# Patient Record
Sex: MALE | Race: WHITE | Employment: FULL TIME | URBAN - METROPOLITAN AREA
[De-identification: names, ages, dates, MRNs, and addresses within clinical notes are randomized per-mention and may not be internally consistent; named-entity substitution may affect disease eponyms.]

---

## 2017-02-08 ENCOUNTER — ANESTHESIA EVENT (OUTPATIENT)
Dept: GASTROENTEROLOGY | Facility: CLINIC | Age: 57
End: 2017-02-08

## 2017-02-08 NOTE — ANESTHESIA PREPROCEDURE EVALUATION
Anesthesia Evaluation     . Pt has had prior anesthetic. Type: General      ROS/MED HX    ENT/Pulmonary:     (+)allergic rhinitis, Moderate Persistent asthma Treatment: Inhaler prn and Inhaler daily,  , recent URI . .    Neurologic:       Cardiovascular:     (+) Dyslipidemia, hypertension----. : . . . :. . Previous cardiac testing date:results:date: results:ECG reviewed date:1-2016 results:Sinus Rhythm   WITHIN NORMAL LIMITS date: results:          METS/Exercise Tolerance:     Hematologic:         Musculoskeletal:         GI/Hepatic:     (+) GERD       Renal/Genitourinary:         Endo:     (+) Obesity, .      Psychiatric:         Infectious Disease:         Malignancy:         Other:                                                   .

## 2017-02-09 DIAGNOSIS — I10 ESSENTIAL HYPERTENSION: Primary | ICD-10-CM

## 2017-02-09 NOTE — TELEPHONE ENCOUNTER
Lisinopril/HCTZ     Last Written Prescription Date: 01/29/16  Last Fill Quantity: 90, # refills: 3  Last Office Visit with G, P or [facility] prescribing provider: 07/15/16       POTASSIUM   Date Value Ref Range Status   02/18/2016 3.9 3.4 - 5.3 mmol/L Final     CREATININE   Date Value Ref Range Status   02/18/2016 1.21 0.66 - 1.25 mg/dL Final     BP Readings from Last 3 Encounters:   07/15/16 132/79   07/07/16 104/74   02/18/16 131/76

## 2017-02-14 RX ORDER — LISINOPRIL/HYDROCHLOROTHIAZIDE 10-12.5 MG
1 TABLET ORAL DAILY
Qty: 30 TABLET | Refills: 0 | Status: SHIPPED | OUTPATIENT
Start: 2017-02-14 | End: 2017-03-20

## 2017-02-15 ENCOUNTER — OFFICE VISIT (OUTPATIENT)
Dept: FAMILY MEDICINE | Facility: CLINIC | Age: 57
End: 2017-02-15
Payer: COMMERCIAL

## 2017-02-15 VITALS
HEIGHT: 69 IN | BODY MASS INDEX: 32.26 KG/M2 | DIASTOLIC BLOOD PRESSURE: 71 MMHG | OXYGEN SATURATION: 96 % | HEART RATE: 98 BPM | TEMPERATURE: 100.4 F | SYSTOLIC BLOOD PRESSURE: 133 MMHG | WEIGHT: 217.8 LBS

## 2017-02-15 DIAGNOSIS — J20.9 ACUTE BRONCHITIS WITH SYMPTOMS > 10 DAYS: Primary | ICD-10-CM

## 2017-02-15 DIAGNOSIS — J45.901 ASTHMA EXACERBATION: ICD-10-CM

## 2017-02-15 PROCEDURE — 99214 OFFICE O/P EST MOD 30 MIN: CPT | Performed by: INTERNAL MEDICINE

## 2017-02-15 RX ORDER — DOXYCYCLINE 100 MG/1
100 TABLET ORAL 2 TIMES DAILY
Qty: 20 TABLET | Refills: 0 | Status: SHIPPED | OUTPATIENT
Start: 2017-02-15 | End: 2017-02-25

## 2017-02-15 RX ORDER — PREDNISONE 20 MG/1
40 TABLET ORAL DAILY
Qty: 10 TABLET | Refills: 1 | Status: SHIPPED | OUTPATIENT
Start: 2017-02-15 | End: 2017-04-04

## 2017-02-15 NOTE — MR AVS SNAPSHOT
After Visit Summary   2/15/2017    Markell Ramsey    MRN: [id number]           Patient Information     Date Of Birth          1960        Visit Information        Provider Department      2/15/2017 9:00 AM Toñito Ramírez MD [facility]        Today's Diagnoses     Acute bronchitis with symptoms > 10 days    -  1    Moderate persistent asthma, uncomplicated        Asthma exacerbation           Follow-ups after your visit        Your next 10 appointments already scheduled     Feb 20, 2017   Procedure with Tadeo Bueno MD   [facility] ([facility])    [address] [address] [phone]           The medical center is located at [address] (between [address] in [place], [place] miles north of [place]).              Who to contact     If you have questions or need follow up information about today's clinic visit or your schedule please contact [facility] directly at [phone].  Normal or non-critical lab and imaging results will be communicated to you by MyChart, letter or phone within 4 business days after the clinic has received the results. If you do not hear from us within 7 days, please contact the clinic through [employer] or phone. If you have a critical or abnormal lab result, we will notify you by phone as soon as possible.  Submit refill requests through [employer] or call your pharmacy and they will forward the refill request to us. Please allow 3 business days for your refill to be completed.          Additional Information About Your Visit        MyChart Information     [employer] gives you secure access to your electronic health record. If you see a primary care provider, you can also send messages to your care team and make appointments. If you have questions, please call your primary care clinic.  If you do not have a primary care provider, please call [phone] and they will assist you.        Care  "EveryWhere ID     This is your Care EveryWhere ID. This could be used by other organizations to access your [place] medical records  [id number]        Your Vitals Were     Pulse Temperature Height Pulse Oximetry BMI (Body Mass Index)       98 100.4  F (38  C) (Tympanic) 5' 9.25\" (1.759 m) 96% 31.93 kg/m2        Blood Pressure from Last 3 Encounters:   02/15/17 133/71   07/15/16 132/79   07/07/16 104/74    Weight from Last 3 Encounters:   02/15/17 217 lb 12.8 oz (98.8 kg)   07/15/16 214 lb (97.1 kg)   07/07/16 215 lb (97.5 kg)              Today, you had the following     No orders found for display         Today's Medication Changes          These changes are accurate as of: 2/15/17  9:31 AM.  If you have any questions, ask your nurse or doctor.               Start taking these medicines.        Dose/Directions    doxycycline Monohydrate 100 MG Tabs   Used for:  Acute bronchitis with symptoms > 10 days   Started by:  Toñito Ramírez MD        Dose:  100 mg   Take 100 mg by mouth 2 times daily for 10 days   Quantity:  20 tablet   Refills:  0         These medicines have changed or have updated prescriptions.        Dose/Directions    predniSONE 20 MG tablet   Commonly known as:  DELTASONE   This may have changed:    - how much to take  - when to take this   Used for:  Asthma exacerbation   Changed by:  Toñito Ramírez MD        Dose:  40 mg   Take 2 tablets (40 mg) by mouth daily   Quantity:  10 tablet   Refills:  1         Stop taking these medicines if you haven't already. Please contact your care team if you have questions.     fluticasone 220 MCG/ACT Inhaler   Commonly known as:  FLOVENT HFA   Stopped by:  Toñito Ramírez MD           sulfamethoxazole-trimethoprim 800-160 MG per tablet   Commonly known as:  BACTRIM DS/SEPTRA DS   Stopped by:  Toñito Ramírez MD                Where to get your medicines      These medications were sent to [facility] [address] [place] [address] - [address] [facility] [address] [facility] OF " [address] [place] [address]  [address] [facility] [address]     Phone:  [phone]     doxycycline Monohydrate 100 MG Tabs    predniSONE 20 MG tablet                Primary Care Provider Office Phone # Fax #    Mary Lezama [phone]       [facility] [address] [facility] [address]        Thank you!     Thank you for choosing [facility]  for your care. Our goal is always to provide you with excellent care. Hearing back from our patients is one way we can continue to improve our services. Please take a few minutes to complete the written survey that you may receive in the mail after your visit with us. Thank you!             Your Updated Medication List - Protect others around you: Learn how to safely use, store and throw away your medicines at www.disposemymeds.org.          This list is accurate as of: 2/15/17  9:31 AM.  Always use your most recent med list.                   Brand Name Dispense Instructions for use    * albuterol (2.5 MG/3ML) 0.083% neb solution     30 vial    Take 1 vial (2.5 mg) by nebulization every 4 hours as needed for shortness of breath / dyspnea       * albuterol 108 (90 BASE) MCG/ACT Inhaler    VENTOLIN HFA    1 Inhaler    Inhale 2 puffs into the lungs every 4 hours as needed Take for shortness of breath, cough or wheeze. Take before exercise.       atorvastatin 10 MG tablet    LIPITOR    90 tablet    Take 1 tablet (10 mg) by mouth daily       beclomethasone 80 MCG/ACT Inhaler    QVAR    3 Inhaler    1 puff each Nare  with adapter BID  (uses NASAL)       doxycycline Monohydrate 100 MG Tabs     20 tablet    Take 100 mg by mouth 2 times daily for 10 days       fexofenadine 180 MG tablet    ALLEGRA    30 tablet    Take 1 tablet (180 mg) by mouth daily       fluticasone-salmeterol 500-50 MCG/DOSE diskus inhaler    ADVAIR DISKUS    3 Inhaler    Inhale 1 puff into the lungs 2 times daily       lisinopril-hydrochlorothiazide  10-12.5 MG per tablet    PRINZIDE/ZESTORETIC    30 tablet    Take 1 tablet by mouth daily (Needs follow-up appointment for this medication)       montelukast 10 MG tablet    SINGULAIR    90 tablet    Take 1 tablet (10 mg) by mouth At Bedtime       nebulizer/tubing/mouthpiece Kit     1 kit    use with nebulizer       omeprazole 20 MG CR capsule    priLOSEC    180 capsule    Take 1 capsule by mouth 2 times daily.       ondansetron 4 MG ODT tab    ZOFRAN ODT    20 tablet    Take 1-2 tablets (4-8 mg) by mouth every 8 hours as needed for nausea       predniSONE 20 MG tablet    DELTASONE    10 tablet    Take 2 tablets (40 mg) by mouth daily       * Notice:  This list has 2 medication(s) that are the same as other medications prescribed for you. Read the directions carefully, and ask your doctor or other care provider to review them with you.

## 2017-02-15 NOTE — NURSING NOTE
"Chief Complaint   Patient presents with     Cough     x 10 days, w/ fever 101.9 yesterday.        Initial /71 (BP Location: Right arm, Patient Position: Chair, Cuff Size: Adult Regular)  Pulse 98  Temp 100.4  F (38  C) (Tympanic)  Ht 5' 9.25\" (1.759 m)  Wt 217 lb 12.8 oz (98.8 kg)  SpO2 96%  BMI 31.93 kg/m2 Estimated body mass index is 31.93 kg/(m^2) as calculated from the following:    Height as of this encounter: 5' 9.25\" (1.759 m).    Weight as of this encounter: 217 lb 12.8 oz (98.8 kg).  Medication Reconciliation: complete   Diana FAULKNER CMA ([facility])    "

## 2017-02-15 NOTE — PROGRESS NOTES
SUBJECTIVE:                                                    Markell Ramsey is a 56 year old male who presents to clinic today for the following health issues:    Chief Complaint   Patient presents with     Cough     x 10 days, w/ fever 101.9 yesterday.      ENT Symptoms             Symptoms: cc Present Absent Comment   Fever/Chills  x  101.9 yesterday, 100.4 in clinic today.  Bad chills yesterda   Fatigue  x     Muscle Aches   x    Eye Irritation   x    Sneezing   x    Nasal Jeronimo/Drg  x  Very inflammed   Sinus Pressure/Pain  x  A little bit.  Worse before and improving.  Used Afrin for a few days   Loss of smell  x     Dental pain   x    Sore Throat   x    Swollen Glands   x    Ear Pain/Fullness   x    Cough x x  Productive of multi colored sputum   Wheeze  x     Chest Pain   x Hurts in the upper chest w/ cough   Shortness of breath   x Only w/ coughing spasm    Rash   x    Other         Symptom duration:  10 days    Symptom severity:     Treatments tried:  Tylenol, Advil for fever    Contacts:  no known        Markell has a history of asthma and his breathing was doing well, but has worsened with this illness.       Problem list and histories reviewed & adjusted, as indicated.  Additional history: as documented    Current Outpatient Prescriptions   Medication Sig Dispense Refill     predniSONE (DELTASONE) 20 MG tablet Take 2 tablets (40 mg) by mouth daily 10 tablet 1     doxycycline Monohydrate 100 MG TABS Take 100 mg by mouth 2 times daily for 10 days 20 tablet 0     lisinopril-hydrochlorothiazide (PRINZIDE/ZESTORETIC) 10-12.5 MG per tablet Take 1 tablet by mouth daily (Needs follow-up appointment for this medication) 30 tablet 0     montelukast (SINGULAIR) 10 MG tablet Take 1 tablet (10 mg) by mouth At Bedtime 90 tablet 0     beclomethasone (QVAR) 80 MCG/ACT Inhaler 1 puff each Nare  with adapter BID  (uses NASAL) 3 Inhaler 0     fluticasone-salmeterol (ADVAIR DISKUS) 500-50 MCG/DOSE diskus inhaler Inhale 1 puff  "into the lungs 2 times daily 3 Inhaler 0     atorvastatin (LIPITOR) 10 MG tablet Take 1 tablet (10 mg) by mouth daily 90 tablet 3     albuterol (VENTOLIN HFA) 108 (90 BASE) MCG/ACT inhaler Inhale 2 puffs into the lungs every 4 hours as needed Take for shortness of breath, cough or wheeze. Take before exercise. 1 Inhaler 11     omeprazole (PRILOSEC) 20 MG capsule Take 1 capsule by mouth 2 times daily. 180 capsule 3     ondansetron (ZOFRAN ODT) 4 MG disintegrating tablet Take 1-2 tablets (4-8 mg) by mouth every 8 hours as needed for nausea 20 tablet 1     albuterol (2.5 MG/3ML) 0.083% nebulizer solution Take 1 vial (2.5 mg) by nebulization every 4 hours as needed for shortness of breath / dyspnea 30 vial 11     Respiratory Therapy Supplies (NEBULIZER/TUBING/MOUTHPIECE) KIT use with nebulizer 1 kit 11     fexofenadine (ALLEGRA) 180 MG tablet Take 1 tablet (180 mg) by mouth daily 30 tablet 11     No Known Allergies    ROS:  Constitutional, HEENT, pulmonary systems are negative, except as otherwise noted.    OBJECTIVE:                                                    /71 (BP Location: Right arm, Patient Position: Chair, Cuff Size: Adult Regular)  Pulse 98  Temp 100.4  F (38  C) (Tympanic)  Ht 5' 9.25\" (1.759 m)  Wt 217 lb 12.8 oz (98.8 kg)  SpO2 96%  BMI 31.93 kg/m2  Body mass index is 31.93 kg/(m^2).    GENERAL: alert and no distress, occasional coughing  EYES: Eyes grossly normal to inspection, PERRL and conjunctivae and sclerae normal  HENT: ear canals and TMs normal, sinuses non tender to percussion, nose and mouth without ulcers or lesions, oropharynx red but no tonsillar exudates  NECK: no adenopathy, no asymmetry, masses, or scars  RESP: slightly increased effort, diffuse expiratory wheezes  CV: regular rate and rhythm, normal S1 S2, no S3 or S4, no murmur, click or rub     Diagnostic Test Results:  none      ASSESSMENT/PLAN:                                                        1. Acute bronchitis with " symptoms > 10 days  2. Asthma exacerbation    Markell has respiratory symptoms of 10 days duration- bronchitis vs possible pneumonia since he has been febrile.  Since I planned to treat with antibiotic due to duration of illness regardless of x-ray result, we decided not to do an x-ray.  He is having diffuse wheezing on exam with slightly increased respiratory effort, so he also has a mild asthma exacerbation.  We will treat with both doxycycline and prednisone.  Continue asthma inhalers.      - doxycycline Monohydrate 100 MG TABS; Take 100 mg by mouth 2 times daily for 10 days  Dispense: 20 tablet; Refill: 0  - predniSONE (DELTASONE) 20 MG tablet; Take 2 tablets (40 mg) by mouth daily  Dispense: 10 tablet; Refill: 1    Follow-up as needed.      Toñito Ramírez MD  [facility]

## 2017-02-16 ASSESSMENT — ASTHMA QUESTIONNAIRES: ACT_TOTALSCORE: 17

## 2017-02-20 ENCOUNTER — ANESTHESIA (OUTPATIENT)
Dept: GASTROENTEROLOGY | Facility: CLINIC | Age: 57
End: 2017-02-20

## 2017-03-20 DIAGNOSIS — I10 ESSENTIAL HYPERTENSION: ICD-10-CM

## 2017-03-20 DIAGNOSIS — E78.5 HYPERLIPIDEMIA LDL GOAL <130: Primary | ICD-10-CM

## 2017-03-20 NOTE — LETTER
[facility]  [address] [address]  Phone: [phone]       March 23, 2017         Markell Ramsey  [address] [facility] [address] [facility] [address]            Dear Markell:    We are concerned about your health care.  We recently provided you with medication refills.  Many medications require routine follow-up with your doctor.  You are due for labs for further refills of your medication.    Your prescription(s) have been refilled for one month so you may have time for the above noted follow-up. Please call to schedule soon so we can assure you have an appointment before your next refills are needed.    Thank you,      Mary Lezama MD

## 2017-03-23 RX ORDER — LISINOPRIL/HYDROCHLOROTHIAZIDE 10-12.5 MG
1 TABLET ORAL DAILY
Qty: 30 TABLET | Refills: 0 | Status: SHIPPED | OUTPATIENT
Start: 2017-03-23 | End: 2017-04-25

## 2017-04-04 ENCOUNTER — HOSPITAL ENCOUNTER (EMERGENCY)
Facility: CLINIC | Age: 57
Discharge: HOME | End: 2017-04-04
Attending: EMERGENCY MEDICINE | Admitting: EMERGENCY MEDICINE
Payer: COMMERCIAL

## 2017-04-04 VITALS
OXYGEN SATURATION: 98 % | RESPIRATION RATE: 16 BRPM | HEIGHT: 69 IN | TEMPERATURE: 98.8 F | HEART RATE: 100 BPM | SYSTOLIC BLOOD PRESSURE: 120 MMHG | WEIGHT: 215 LBS | DIASTOLIC BLOOD PRESSURE: 83 MMHG | BODY MASS INDEX: 31.84 KG/M2

## 2017-04-04 DIAGNOSIS — L02.419 CELLULITIS AND ABSCESS OF LEG: ICD-10-CM

## 2017-04-04 DIAGNOSIS — L03.119 CELLULITIS AND ABSCESS OF LEG: ICD-10-CM

## 2017-04-04 PROCEDURE — 10060 I&D ABSCESS SIMPLE/SINGLE: CPT

## 2017-04-04 PROCEDURE — 99284 EMERGENCY DEPT VISIT MOD MDM: CPT | Mod: 25 | Performed by: EMERGENCY MEDICINE

## 2017-04-04 PROCEDURE — 99283 EMERGENCY DEPT VISIT LOW MDM: CPT | Mod: 25

## 2017-04-04 PROCEDURE — 87070 CULTURE OTHR SPECIMN AEROBIC: CPT | Performed by: EMERGENCY MEDICINE

## 2017-04-04 PROCEDURE — 10060 I&D ABSCESS SIMPLE/SINGLE: CPT | Performed by: EMERGENCY MEDICINE

## 2017-04-04 PROCEDURE — 87186 SC STD MICRODIL/AGAR DIL: CPT | Performed by: EMERGENCY MEDICINE

## 2017-04-04 PROCEDURE — 87077 CULTURE AEROBIC IDENTIFY: CPT | Performed by: EMERGENCY MEDICINE

## 2017-04-04 RX ORDER — SULFAMETHOXAZOLE/TRIMETHOPRIM 800-160 MG
1 TABLET ORAL 2 TIMES DAILY
Qty: 20 TABLET | Refills: 0 | Status: SHIPPED | OUTPATIENT
Start: 2017-04-04 | End: 2017-04-04 | Stop reason: ALTCHOICE

## 2017-04-04 RX ORDER — CLINDAMYCIN HCL 300 MG
300 CAPSULE ORAL 4 TIMES DAILY
Qty: 40 CAPSULE | Refills: 0 | Status: SHIPPED | OUTPATIENT
Start: 2017-04-04 | End: 2017-04-14

## 2017-04-04 NOTE — ED PROVIDER NOTES
"  History     Chief Complaint   Patient presents with     Cyst     has wound near pilinidal area  has hx of wound  with MRSA    Started  Thursday  and has greatly increased over last few days      HPI  Markell Ramsey is a 56 year old male who presents with a left posterior leg \"cyst\" present for ~5 days, began this past Thursday, which became more severe yesterday. 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 He notes that it hurts slightly to sit on for a few seconds, but he becomes accustomed to the pain, at which point it does not bother him. No pointing or drainage. F/C/V.   He notes that this has occurred a few times over the past two years, where a cyst will develop, quickly \"balloon\" in size, and require incision, drainage, and antibiotics, his most recent cyst was in the right axilla.   He notes that one of the cysts was cultured and grew MRSA, but does not note any systemic symptoms. No fever or chills, bladder or bowel issues (other than baseline IBS).    I have reviewed the Medications, Allergies, Past Medical and Surgical History, and Social History in the Epic system.  Patient Active Problem List   Diagnosis     Allergic rhinitis, unspecified allergic rhinitis type     Gastroesophageal reflux disease without esophagitis     Non morbid obesity due to excess calories     CARDIOVASCULAR SCREENING; LDL GOAL LESS THAN 160     IgM deficiency (H)     Moderate persistent asthma, uncomplicated     Essential hypertension     Hyperlipidemia LDL goal <130     Past Medical History:   Diagnosis Date     Acute bronchitis      Allergic rhinitis, cause unspecified 9/23/2008     Allergic rhinitis, unspecified allergic rhinitis type 9/23/2008     Problem list name updated by automated process. Provider to review     Esophageal reflux      Pneumonia 2/24/2013    MRSA, required hospitalization     Unspecified asthma(493.90)      Past Surgical History:   Procedure Laterality Date     C APPENDECTOMY  " "08/2006     SURGICAL HISTORY OF -   2/23/06    L4-5 microdiscectomy     TONSILLECTOMY & ADENOIDECTOMY       No current facility-administered medications for this encounter.      Current Outpatient Prescriptions   Medication     clindamycin (CLEOCIN) 300 MG capsule     lisinopril-hydrochlorothiazide (PRINZIDE/ZESTORETIC) 10-12.5 MG per tablet     montelukast (SINGULAIR) 10 MG tablet     beclomethasone (QVAR) 80 MCG/ACT Inhaler     fluticasone-salmeterol (ADVAIR DISKUS) 500-50 MCG/DOSE diskus inhaler     atorvastatin (LIPITOR) 10 MG tablet     albuterol (2.5 MG/3ML) 0.083% nebulizer solution     albuterol (VENTOLIN HFA) 108 (90 BASE) MCG/ACT inhaler     fexofenadine (ALLEGRA) 180 MG tablet     omeprazole (PRILOSEC) 20 MG capsule     Respiratory Therapy Supplies (NEBULIZER/TUBING/MOUTHPIECE) KIT     No Known Allergies  Social History   Substance Use Topics     Smoking status: Never Smoker     Smokeless tobacco: Never Used     Alcohol use Yes      Comment: rare     Family History   Problem Relation Age of Onset     CANCER Father      lung     Allergies Mother      asthma     Respiratory Sister      asthma     Respiratory Son      Upper Resp infections     Respiratory Son      asthma       Review of Systems  Review Of Systems  Skin: positive for left posterior leg abscess  Eyes: negative  Ears/Nose/Throat: negative  Respiratory: No shortness of breath, dyspnea on exertion, cough, or hemoptysis (past history of asthma, but no asthma symptoms currently)  Cardiovascular: negative  Gastrointestinal: negative (past history of IBS, but not complaining of any GI issues at this time.  Genitourinary: negative  Musculoskeletal: negative  Neurologic: negative  Psychiatric: negative  Hematologic/Lymphatic/Immunologic: negative  Endocrine: negative    Physical Exam   BP: 120/83  Pulse: 100  Temp: 98.8  F (37.1  C)  Resp: 16  Height: 175.3 cm (5' 9\")  Weight: 97.5 kg (215 lb)  SpO2: 98 %  Physical Exam  Exam:  Constitutional: healthy, " alert and no distress  Head: Normocephalic. No masses, lesions, tenderness or abnormalities  ENT: no neck nodes or sinus tenderness  Cardiovascular: negative, PMI normal. No lifts, heaves, or thrills. RRR. No murmurs, clicks gallops or rub  Respiratory: negative, Lungs clear  Musculoskeletal: extremities normal- no gross deformities noted, gait normal and normal muscle tone  Skin: 2.5 by 2.5 cm cystic mass noted in posterior left thigh, just inferior to left gluteal region, surrounding erythema noted ~5 cm in diameter.  Neurologic: Gait normal. Reflexes normal and symmetric. Sensation grossly WNL.  Psychiatric: mentation appears normal and affect normal/bright  Hematologic/Lymphatic/Immunologic: Normal cervical lymph nodes    ED Course     ED Course     Incision + drainage  Performed by: MARK CAMPBELL  Authorized by: MARK CAMPBELL   Consent: Verbal consent obtained.  Risks and benefits: risks, benefits and alternatives were discussed  Consent given by: patient  Patient understanding: patient states understanding of the procedure being performed  Patient consent: the patient's understanding of the procedure matches consent given  Type: abscess  Body area: lower extremity  Location details: left leg  Anesthesia: local infiltration    Anesthesia:  Anesthesia: local infiltration  Local Anesthetic: bupivacaine 0.5% without epinephrine   Sedation:  Patient sedated: no    Scalpel size: 15  Needle gauge: 18 (Attempted aspiration in addition to incision and drainage.)  Incision type: single straight  Incision depth: subcutaneous  Complexity: complex (Wound probed for any potential loculations.  Abscess cavity irrigated.  18-gauge needle on a syringe was used to attempt aspiration of possible loculations adjacent to the primary abscess cavity.)  Drainage: purulent  Drainage amount: scant  Wound treatment: wound left open  Packing material: none  Patient tolerance: Patient tolerated the procedure well with no immediate  complications              Results for orders placed or performed during the hospital encounter of 04/04/17   Abscess Culture Aerobic Bacterial   Result Value Ref Range    Specimen Description Left Leg Abscess     Special Requests       Specimen collected in eSwab transport (white cap) This specimen was received on a   swab. Results may not be optimal. For maximum sensitivity of detection, submit   tissue, fluid, or needle aspirate.      Culture Micro (A)      Moderate growth Methicillin resistant Staphylococcus aureus (MRSA) This isolate   DOES NOT demonstrate inducible clindamycin resistance in vitro. Clindamycin is   susceptible and could be used when indicated, however, erythromycin is   resistant and should not be used.      Micro Report Status FINAL 04/06/2017     Organism:       Moderate growth Methicillin resistant Staphylococcus aureus (MRSA) This isolate   DOES NOT demonstrate inducible clindamycin resistance in vitro. Clindamycin is   susceptible and could be used when indicated, however, erythromycin is   resistant and should not be used.         Susceptibility    Moderate growth methicillin resistant staphylococcus aureus (mrsa) this isolate  does not demonstrate inducible clindamycin resistance in vitro. clindamycin is  susceptible and could be used when clara -  (no method available)     CIPROFLOXACIN 4 Resistant  ug/mL     CLINDAMYCIN <=0.25 Susceptible  ug/mL     ERYTHROMYCIN >=8 Resistant  ug/mL     GENTAMICIN <=0.5 Susceptible  ug/mL     LEVOFLOXACIN 4 Resistant  ug/mL     OXACILLIN >=4 Resistant  ug/mL     PENICILLIN >=0.5 Resistant  ug/mL     TETRACYCLINE <=1 Susceptible  ug/mL     Trimethoprim/Sulfa <=.5/9.5 Susceptible  ug/mL     VANCOMYCIN <=0.5 Susceptible  ug/mL     LINEZOLID 2 Susceptible  ug/mL       Labs Ordered and Resulted from Time of ED Arrival Up to the Time of Departure from the ED - No data to display    Assessments & Plan (with Medical Decision Making)   Cellulitis and abscess of  leg:   Comment: History of MRSA, Wound culture from 7/21/2013 reviewed, this grew MRSA, I reviewed sensitivities. Will presume MRSA and rx Clindamycin, pending today's wound culture results.  Plan: Incision/drainage and culture of abscess performed in ED  Patient agreed to a 10 day course of clindamycin. He declined an opiate analgesic.  Wound cultures pending.  Patient instructed to follow up as needed for escalation of condition or lack of response to treatment.  PCC f/u in the near future.    I have reviewed the nursing notes.    I have reviewed the findings, diagnosis, plan and need for follow up with the patient.    New Prescriptions    CLINDAMYCIN (CLEOCIN) 300 MG CAPSULE    Take 1 capsule (300 mg) by mouth 4 times daily for 10 days            Final diagnoses:   Cellulitis and abscess of leg     Patient seen and evaluated with med studentNarciso MS3. Note revised by me. I&D performed by me with the med student assisting.    4/4/2017   [facility] EMERGENCY DEPARTMENT     Agustin Villarreal MD  04/07/17 [id number]

## 2017-04-04 NOTE — ED AVS SNAPSHOT
[facility]    [address] [facility] [address]    Phone:  [phone]    Fax:  [phone]                                       Markell Ramsey   MRN: [id number]    Department:  [facility]   Date of Visit:  4/4/2017           Patient Information     Date Of Birth          1960        Your diagnoses for this visit were:     Cellulitis and abscess of leg        You were seen by Agustin Villarreal MD.      Follow-up Information     Follow up with Mary Lezama DO In 2 days.    Specialty:  Internal Medicine    Why:  For wound re-check    Contact information:    [facility]  [address] [facility] [id number] [phone]          Follow up with [facility].    Specialty:  EMERGENCY MEDICINE    Why:  If symptoms worsen    Contact information:    [address] [place] [id number] [phone]    Additional information:    The medical center is located at   [address] [place] [address] (between [address] and   [facility] in [place], four miles north   of [place]).        Discharge Instructions         Abscess (Incision & Drainage)  An abscess (sometimes called a  boil ) occurs when bacteria get trapped under the skin and begin to grow. Pus forms inside the abscess as the body responds to the bacteria. An abscess can occur with an insect bite, ingrown hair, blocked oil gland, pimple, cyst, or puncture wound.  Treatment of your abscess has required an incision to drain the pus. If the abscess pocket was large, a gauze packing may have been inserted. This will need to be removed and possibly replaced on your next visit. Antibiotics are not required in the treatment of a simple abscess, unless the infection is spreading into the skin around the wound (known as  cellulitis ).  Healing of the wound will take about one to two weeks depending on the size of the abscess. Healthy tissue will grow from the bottom and  sides of the opening until it seals over.  Home care  The following home care guidelines can help your wound heal:    The wound may drain for the first two days. Cover the wound with a clean dry dressing. If the dressing becomes soaked with blood or pus, change it.    If a gauze packing was placed inside the abscess cavity, you may be advised to remove it yourself. You may do this in the shower. Once the packing is removed, you should wash the area in the shower or bath 3 to 4 times a day, until the skin opening has closed. Make sure you wash your hands after changing the packing or cleaning the wound.    If you were prescribed antibiotics, take them as directed until they are all gone.    You may use acetaminophen (Tylenol) or ibuprofen (Motrin, Advil) to control pain, unless another pain medicine was prescribed. If you have liver disease or ever had a stomach ulcer, talk with your doctor before using these medicines.  Follow-up care  Follow up with your doctor as advised by our staff. If a gauze packing was inserted in your wound, it should be removed in 1 to 2 days. Check your wound every day for the signs of worsening infection listed below.  When to seek medical care  Get prompt medical attention if any of the following occur:    Increasing redness or swelling    Red streaks in the skin leading away from the wound    Increasing local pain or swelling    Continued pus draining from the wound two days after treatment    Fever of 100.4 F (38 C) or higher, or as directed by your health care provider    Boil returns when you are at home.    4248-0276 [employer]. [address] [place] [address], [place] [address]. All rights reserved. This information is not intended as a substitute for professional medical care. Always follow your healthcare professional's instructions.          Cellulitis  Cellulitis is an infection of the deep layers of skin. A break in the skin, such as a cut or scratch, can let bacteria  under the skin. If the bacteria get to deep layers of the skin, it can be serious. If not treated, cellulitis can get into the bloodstream and lymph nodes. The infection can then spread throughout the body. This causes serious illness.  Cellulitis causes the affected skin to become red, swollen, warm, and sore. The reddened areas have a visible border. An open sore may leak fluid (pus). You may have a fever, chills, and pain.  Cellulitis is treated with antibiotics taken for 7 to 10 days. An open sore may be cleaned and covered with cool wet gauze. Symptoms should get better 1 to 2 days after treatment is started. Make sure to take all the antibiotics for the full number of days until they are gone. Keep taking the medicine even if your symptoms go away.  Home care  Follow these tips:    Limit the use of the part of your body with cellulitis. Movement can cause the infection to spread.    If the infection is on your leg, walk as little as possible in the first few days of the treatment. Keep your leg raised while sitting. This will help to reduce swelling.    Take all of the antibiotic medicine exactly as directed until it is gone. Do not miss any doses, especially during the first 7 days. Don t stop taking the medicine when your symptoms get better.    Keep the affected area clean and dry.    Wash your hands with soap and warm water before and after touching your skin. Anyone else who touches your skin should also wash his or her hands. Don't share towels.  Follow-up care  Follow up with your healthcare provider. If your infection does not go away on 1 antibiotic, your healthcare provider will prescribe a different one.  When to seek medical advice  Call your healthcare provider right away if any of these occur:    Red areas that spread    Swelling or pain that gets worse    Fluid leaking from the skin (pus)    Fever higher of 100.4  F (38.0  C) or higher after 2 days on antibiotics    0902-2932 [facility]  [employer]. [address] [place] [address]. All rights reserved. This information is not intended as a substitute for professional medical care. Always follow your healthcare professional's instructions.          Discharge Instructions for Cellulitis  You have been diagnosed with cellulitis. This is an infection in the deepest layer of the skin. In some cases, the infection also affects the muscle. Cellulitis is caused by bacteria. The bacteria can enter the body through broken skin. This can happen with a cut, scratch, animal bite, or an insect bite that has been scratched. You may have been treated in the hospital with antibiotics and fluids. You will likely be given a prescription for antibiotics to take at home. This sheet will help you take care of yourself at home.  Home Care  When you are home:    Take the prescribed antibiotic medication you are given as directed until it is gone. Take it even if you feel better. It treats the infection and stops it from returning. Not taking all of the medication can make future infections hard to treat.    Keep the infected area clean.    When possible, raise the infected area above the level of your heart. This helps keep swelling down.    Talk to your doctor if you are in pain. Ask what kind of over-the-counter medication you can take for pain.    Apply clean bandages as advised.    Take your temperature once a day for a week.    Wash your hands often to prevent spreading the infection.  In the future, wash your hands before and after you touch cuts, scratches, or bandages. This will help prevent infection.   When to Call Your Doctor  Call your doctor immediately if you have any of the following:    Vomiting    Fever of100.4 F (38 C) or higher, or as directed by your health care provider    Shaking chills    Redness that gets worse in or around the infected area    Swelling of the infected area    Pain that gets worse in or around the infected  area    Difficulty or pain when moving the joints above or below the infected area    Discharge or pus draining from the area     0859-2828 [employer]. [address] [place] [address], [place] [address]. All rights reserved. This information is not intended as a substitute for professional medical care. Always follow your healthcare professional's instructions.          24 Hour Appointment Hotline       To make an appointment at any [facility], call [phone] ([phone]). If you don't have a family doctor or clinic, we will help you find one. [facility] are conveniently located to serve the needs of you and your family.             Review of your medicines      START taking        Dose / Directions Last dose taken    clindamycin 300 MG capsule   Commonly known as:  CLEOCIN   Dose:  300 mg   Quantity:  40 capsule        Take 1 capsule (300 mg) by mouth 4 times daily for 10 days   Refills:  0          Our records show that you are taking the medicines listed below. If these are incorrect, please call your family doctor or clinic.        Dose / Directions Last dose taken    * albuterol (2.5 MG/3ML) 0.083% neb solution   Dose:  1 vial   Quantity:  30 vial        Take 1 vial (2.5 mg) by nebulization every 4 hours as needed for shortness of breath / dyspnea   Refills:  11        * albuterol 108 (90 BASE) MCG/ACT Inhaler   Commonly known as:  VENTOLIN HFA   Dose:  2 puff   Quantity:  1 Inhaler        Inhale 2 puffs into the lungs every 4 hours as needed Take for shortness of breath, cough or wheeze. Take before exercise.   Refills:  11        atorvastatin 10 MG tablet   Commonly known as:  LIPITOR   Dose:  10 mg   Quantity:  90 tablet        Take 1 tablet (10 mg) by mouth daily   Refills:  3        beclomethasone 80 MCG/ACT Inhaler   Commonly known as:  QVAR   Quantity:  3 Inhaler        1 puff each Nare  with adapter BID  (uses NASAL)   Refills:  0        fexofenadine 180 MG tablet   Commonly  known as:  ALLEGRA   Dose:  180 mg   Quantity:  30 tablet        Take 1 tablet (180 mg) by mouth daily   Refills:  11        fluticasone-salmeterol 500-50 MCG/DOSE diskus inhaler   Commonly known as:  ADVAIR DISKUS   Dose:  1 puff   Quantity:  3 Inhaler        Inhale 1 puff into the lungs 2 times daily   Refills:  0        lisinopril-hydrochlorothiazide 10-12.5 MG per tablet   Commonly known as:  PRINZIDE/ZESTORETIC   Dose:  1 tablet   Quantity:  30 tablet        Take 1 tablet by mouth daily (Needs follow-up appointment for this medication)   Refills:  0        montelukast 10 MG tablet   Commonly known as:  SINGULAIR   Dose:  10 mg   Quantity:  90 tablet        Take 1 tablet (10 mg) by mouth At Bedtime   Refills:  0        nebulizer/tubing/mouthpiece Kit   Quantity:  1 kit        use with nebulizer   Refills:  11        omeprazole 20 MG CR capsule   Commonly known as:  priLOSEC   Dose:  20 mg   Quantity:  180 capsule        Take 1 capsule by mouth 2 times daily.   Refills:  3        * Notice:  This list has 2 medication(s) that are the same as other medications prescribed for you. Read the directions carefully, and ask your doctor or other care provider to review them with you.            Prescriptions were sent or printed at these locations (1 Prescription)                   [facility] [address] [place], [place] [address] [facility] AT [address]   [address] [facility] [address]    Telephone:  [phone]   Fax:  [phone]   Hours:                  E-Prescribed (1 of 1)         clindamycin (CLEOCIN) 300 MG capsule                Procedures and tests performed during your visit     Incision and drainage      Orders Needing Specimen Collection     Ordered          04/04/17 1940  Abscess Culture Aerobic Bacterial - STAT, Prio: STAT, Needs to be Collected     Scheduled Task Status   04/04/17 1937 Collect Abscess Culture Aerobic Bacterial Open   Order Class:  PCU Collect                   Pending Results     No orders found from 4/2/2017 to 4/5/2017.            Pending Culture Results     No orders found from 4/2/2017 to 4/5/2017.            Test Results From Your Hospital Stay               Thank you for choosing [facility]       Thank you for choosing [facility] for your care. Our goal is always to provide you with excellent care. Hearing back from our patients is one way we can continue to improve our services. Please take a few minutes to complete the written survey that you may receive in the mail after you visit with us. Thank you!        [employer] Information     [employer] gives you secure access to your electronic health record. If you see a primary care provider, you can also send messages to your care team and make appointments. If you have questions, please call your primary care clinic.  If you do not have a primary care provider, please call [phone] and they will assist you.        Care EveryWhere ID     This is your Care EveryWhere ID. This could be used by other organizations to access your [facility] medical records  [id number]        After Visit Summary       This is your record. Keep this with you and show to your community pharmacist(s) and doctor(s) at your next visit.

## 2017-04-04 NOTE — ED AVS SNAPSHOT
[facility]    [address] [facility] [address]    Phone:  [phone]    Fax:  [phone]                                       Markell Ramsey   MRN: [id number]    Department:  [facility]   Date of Visit:  4/4/2017           After Visit Summary Signature Page     I have received my discharge instructions, and my questions have been answered. I have discussed any challenges I see with this plan with the nurse or doctor.    ..........................................................................................................................................  Patient/Patient Representative Signature      ..........................................................................................................................................  Patient Representative Print Name and Relationship to Patient    ..................................................               ................................................  Date                                            Time    ..........................................................................................................................................  Reviewed by Signature/Title    ...................................................              ..............................................  Date                                                            Time

## 2017-04-04 NOTE — ED NOTES
Pt presents to ER w c/o pilonidal cyst.  He has a h/o the same x 3, 2 of which have been positive for MRSA.      Pt is placed in contact isolation in room 17 of the ER.

## 2017-04-05 NOTE — DISCHARGE INSTRUCTIONS
Abscess (Incision & Drainage)  An abscess (sometimes called a  boil ) occurs when bacteria get trapped under the skin and begin to grow. Pus forms inside the abscess as the body responds to the bacteria. An abscess can occur with an insect bite, ingrown hair, blocked oil gland, pimple, cyst, or puncture wound.  Treatment of your abscess has required an incision to drain the pus. If the abscess pocket was large, a gauze packing may have been inserted. This will need to be removed and possibly replaced on your next visit. Antibiotics are not required in the treatment of a simple abscess, unless the infection is spreading into the skin around the wound (known as  cellulitis ).  Healing of the wound will take about one to two weeks depending on the size of the abscess. Healthy tissue will grow from the bottom and sides of the opening until it seals over.  Home care  The following home care guidelines can help your wound heal:    The wound may drain for the first two days. Cover the wound with a clean dry dressing. If the dressing becomes soaked with blood or pus, change it.    If a gauze packing was placed inside the abscess cavity, you may be advised to remove it yourself. You may do this in the shower. Once the packing is removed, you should wash the area in the shower or bath 3 to 4 times a day, until the skin opening has closed. Make sure you wash your hands after changing the packing or cleaning the wound.    If you were prescribed antibiotics, take them as directed until they are all gone.    You may use acetaminophen (Tylenol) or ibuprofen (Motrin, Advil) to control pain, unless another pain medicine was prescribed. If you have liver disease or ever had a stomach ulcer, talk with your doctor before using these medicines.  Follow-up care  Follow up with your doctor as advised by our staff. If a gauze packing was inserted in your wound, it should be removed in 1 to 2 days. Check your wound every day for the signs  of worsening infection listed below.  When to seek medical care  Get prompt medical attention if any of the following occur:    Increasing redness or swelling    Red streaks in the skin leading away from the wound    Increasing local pain or swelling    Continued pus draining from the wound two days after treatment    Fever of 100.4 F (38 C) or higher, or as directed by your health care provider    Boil returns when you are at home.    2744-3144 [employer]. [address] [place] [address], [place] [address]. All rights reserved. This information is not intended as a substitute for professional medical care. Always follow your healthcare professional's instructions.          Cellulitis  Cellulitis is an infection of the deep layers of skin. A break in the skin, such as a cut or scratch, can let bacteria under the skin. If the bacteria get to deep layers of the skin, it can be serious. If not treated, cellulitis can get into the bloodstream and lymph nodes. The infection can then spread throughout the body. This causes serious illness.  Cellulitis causes the affected skin to become red, swollen, warm, and sore. The reddened areas have a visible border. An open sore may leak fluid (pus). You may have a fever, chills, and pain.  Cellulitis is treated with antibiotics taken for 7 to 10 days. An open sore may be cleaned and covered with cool wet gauze. Symptoms should get better 1 to 2 days after treatment is started. Make sure to take all the antibiotics for the full number of days until they are gone. Keep taking the medicine even if your symptoms go away.  Home care  Follow these tips:    Limit the use of the part of your body with cellulitis. Movement can cause the infection to spread.    If the infection is on your leg, walk as little as possible in the first few days of the treatment. Keep your leg raised while sitting. This will help to reduce swelling.    Take all of the antibiotic medicine exactly as  directed until it is gone. Do not miss any doses, especially during the first 7 days. Don t stop taking the medicine when your symptoms get better.    Keep the affected area clean and dry.    Wash your hands with soap and warm water before and after touching your skin. Anyone else who touches your skin should also wash his or her hands. Don't share towels.  Follow-up care  Follow up with your healthcare provider. If your infection does not go away on 1 antibiotic, your healthcare provider will prescribe a different one.  When to seek medical advice  Call your healthcare provider right away if any of these occur:    Red areas that spread    Swelling or pain that gets worse    Fluid leaking from the skin (pus)    Fever higher of 100.4  F (38.0  C) or higher after 2 days on antibiotics    4104-9856 [employer]. [address] [place] [address]. All rights reserved. This information is not intended as a substitute for professional medical care. Always follow your healthcare professional's instructions.          Discharge Instructions for Cellulitis  You have been diagnosed with cellulitis. This is an infection in the deepest layer of the skin. In some cases, the infection also affects the muscle. Cellulitis is caused by bacteria. The bacteria can enter the body through broken skin. This can happen with a cut, scratch, animal bite, or an insect bite that has been scratched. You may have been treated in the hospital with antibiotics and fluids. You will likely be given a prescription for antibiotics to take at home. This sheet will help you take care of yourself at home.  Home Care  When you are home:    Take the prescribed antibiotic medication you are given as directed until it is gone. Take it even if you feel better. It treats the infection and stops it from returning. Not taking all of the medication can make future infections hard to treat.    Keep the infected area clean.    When possible,  raise the infected area above the level of your heart. This helps keep swelling down.    Talk to your doctor if you are in pain. Ask what kind of over-the-counter medication you can take for pain.    Apply clean bandages as advised.    Take your temperature once a day for a week.    Wash your hands often to prevent spreading the infection.  In the future, wash your hands before and after you touch cuts, scratches, or bandages. This will help prevent infection.   When to Call Your Doctor  Call your doctor immediately if you have any of the following:    Vomiting    Fever of100.4 F (38 C) or higher, or as directed by your health care provider    Shaking chills    Redness that gets worse in or around the infected area    Swelling of the infected area    Pain that gets worse in or around the infected area    Difficulty or pain when moving the joints above or below the infected area    Discharge or pus draining from the area     8671-6500 [employer]. [address] [place] [address], [place] [address]. All rights reserved. This information is not intended as a substitute for professional medical care. Always follow your healthcare professional's instructions.

## 2017-04-06 ENCOUNTER — TELEPHONE (OUTPATIENT)
Dept: EMERGENCY MEDICINE | Facility: CLINIC | Age: 57
End: 2017-04-06

## 2017-04-06 LAB
BACTERIA SPEC CULT: ABNORMAL
Lab: ABNORMAL
MICRO REPORT STATUS: ABNORMAL
MICROORGANISM SPEC CULT: ABNORMAL
SPECIMEN SOURCE: ABNORMAL

## 2017-04-06 NOTE — TELEPHONE ENCOUNTER
"[facility] Lab result notification     Patient/parent Name  Abscess culture  (L02.419, L03.119) Cellulitis and abscess of leg  Comment: Wound culture from 7/21/2013 reviewed, noted MRSA susceptible to clindamycin. Antibiotic course will therefore be tailored to this infection, pending today's wound culture results.  Plan:   Incision/drainage and culture of abscess performed in ED  Patient agreed to a 10 day course of clindamycin.  Wound cultures pending.  Patient instructed to follow up as needed for escalation of condition or lack of response to treatment.     RN Assessment (Patient s current Symptoms), include time called.  [Insert Left message here if message left]  1:10 pm Pt states, \"It's doing much better.\" Discussed MRSA in general. Advised to finish full course of antibiotics as prescribed and drink plenty of fluids. And follow up with your PCP as the ED provider recommended.     Please Contact your PCP clinic or return to the Emergency department if your:    Symptoms return.    Symptoms do not improve after 3 days on antibiotic.    Symptoms do not resolve after completing antibiotic.    Symptoms worsen or other concerning symptom's.    PCP follow-up Questions asked: YES       [RN Name]  Elissa Martines RN  [facility] RN  Lung Nodule and ED Lab Result F/u RN  Epic pool (ED late result f/u RN): [id number]  # [phone]      "

## 2017-04-25 ENCOUNTER — OFFICE VISIT (OUTPATIENT)
Dept: FAMILY MEDICINE | Facility: CLINIC | Age: 57
End: 2017-04-25
Payer: COMMERCIAL

## 2017-04-25 VITALS
HEIGHT: 69 IN | WEIGHT: 218 LBS | OXYGEN SATURATION: 96 % | DIASTOLIC BLOOD PRESSURE: 77 MMHG | BODY MASS INDEX: 32.29 KG/M2 | TEMPERATURE: 98.5 F | SYSTOLIC BLOOD PRESSURE: 134 MMHG | HEART RATE: 85 BPM

## 2017-04-25 DIAGNOSIS — I10 ESSENTIAL HYPERTENSION: Primary | ICD-10-CM

## 2017-04-25 DIAGNOSIS — E78.5 HYPERLIPIDEMIA LDL GOAL <130: ICD-10-CM

## 2017-04-25 DIAGNOSIS — J30.1 SEASONAL ALLERGIC RHINITIS DUE TO POLLEN: ICD-10-CM

## 2017-04-25 DIAGNOSIS — J45.40 MODERATE PERSISTENT ASTHMA, UNCOMPLICATED: Chronic | ICD-10-CM

## 2017-04-25 DIAGNOSIS — Z12.11 SPECIAL SCREENING FOR MALIGNANT NEOPLASMS, COLON: ICD-10-CM

## 2017-04-25 PROCEDURE — 99214 OFFICE O/P EST MOD 30 MIN: CPT | Performed by: INTERNAL MEDICINE

## 2017-04-25 RX ORDER — LISINOPRIL/HYDROCHLOROTHIAZIDE 10-12.5 MG
1 TABLET ORAL DAILY
Qty: 90 TABLET | Refills: 3 | Status: SHIPPED | OUTPATIENT
Start: 2017-04-25 | End: 2018-04-29

## 2017-04-25 RX ORDER — ATORVASTATIN CALCIUM 10 MG/1
10 TABLET, FILM COATED ORAL DAILY
Qty: 90 TABLET | Refills: 3 | Status: SHIPPED | OUTPATIENT
Start: 2017-04-25 | End: 2018-06-25

## 2017-04-25 NOTE — PROGRESS NOTES
SUBJECTIVE:                                                    Markell Ramsey is a 56 year old male who presents to clinic today for the following health issues:  Chief Complaint   Patient presents with     Hypertension     refill Lisinopril     Referral     colonoscopy     Hypertension Follow-up      Outpatient blood pressures are not being checked on a regular basis.  Will check on occasion w/ readings 130s/70s    Low Salt Diet: rarely adds salt, but does not monitor sodium in foods.        Amount of exercise or physical activity: not much until bike comes out.      Problems taking medications regularly: No, just ran out the past couple of days.     Medication side effects: none    Diet: regular (no restrictions)    Markell has some SOB from allergies and asthma currently, but has not had any chest pain, dizziness, edema, or headache recently.      Problem list and histories reviewed & adjusted, as indicated.  Additional history: as documented    Current Outpatient Prescriptions   Medication Sig Dispense Refill     lisinopril-hydrochlorothiazide (PRINZIDE/ZESTORETIC) 10-12.5 MG per tablet Take 1 tablet by mouth daily (Needs follow-up appointment for this medication) 90 tablet 3     atorvastatin (LIPITOR) 10 MG tablet Take 1 tablet (10 mg) by mouth daily 90 tablet 3     beclomethasone (QVAR) 80 MCG/ACT Inhaler Inhale 1 puff into the lungs 2 times daily 1 puff each Nare  with adapter BID  (uses NASAL) 3 Inhaler 3     fluticasone-salmeterol (ADVAIR DISKUS) 500-50 MCG/DOSE diskus inhaler Inhale 1 puff into the lungs 2 times daily 3 Inhaler 3     montelukast (SINGULAIR) 10 MG tablet Take 1 tablet (10 mg) by mouth At Bedtime 90 tablet 0     fexofenadine (ALLEGRA) 180 MG tablet Take 1 tablet (180 mg) by mouth daily 30 tablet 11     omeprazole (PRILOSEC) 20 MG capsule Take 1 capsule by mouth 2 times daily. (Patient taking differently: Take 20 mg by mouth daily ) 180 capsule 3     [DISCONTINUED] lisinopril-hydrochlorothiazide  "(PRINZIDE/ZESTORETIC) 10-12.5 MG per tablet Take 1 tablet by mouth daily (Needs follow-up appointment for this medication) 30 tablet 0     [DISCONTINUED] beclomethasone (QVAR) 80 MCG/ACT Inhaler 1 puff each Nare  with adapter BID  (uses NASAL) (Patient taking differently: Spray 1 puff into both nostrils 2 times daily 1 puff each Nare  with adapter BID  (uses NASAL)) 3 Inhaler 0     [DISCONTINUED] fluticasone-salmeterol (ADVAIR DISKUS) 500-50 MCG/DOSE diskus inhaler Inhale 1 puff into the lungs 2 times daily 3 Inhaler 0     [DISCONTINUED] atorvastatin (LIPITOR) 10 MG tablet Take 1 tablet (10 mg) by mouth daily 90 tablet 3     albuterol (2.5 MG/3ML) 0.083% nebulizer solution Take 1 vial (2.5 mg) by nebulization every 4 hours as needed for shortness of breath / dyspnea 30 vial 11     Respiratory Therapy Supplies (NEBULIZER/TUBING/MOUTHPIECE) KIT use with nebulizer 1 kit 11     albuterol (VENTOLIN HFA) 108 (90 BASE) MCG/ACT inhaler Inhale 2 puffs into the lungs every 4 hours as needed Take for shortness of breath, cough or wheeze. Take before exercise. 1 Inhaler 11     No Known Allergies    Reviewed and updated as needed this visit by clinical staff  Tobacco  Allergies  Med Hx  Surg Hx  Fam Hx  Soc Hx      Reviewed and updated as needed this visit by Provider         ROS:  Constitutional, cardiovascular, pulmonary systems are negative, except as otherwise noted.    OBJECTIVE:                                                    /77 (BP Location: Left arm, Patient Position: Chair, Cuff Size: Adult Large)  Pulse 85  Temp 98.5  F (36.9  C) (Tympanic)  Ht 5' 9\" (1.753 m)  Wt 218 lb (98.9 kg)  SpO2 96%  BMI 32.19 kg/m2  Body mass index is 32.19 kg/(m^2).  GENERAL: healthy, alert and no distress  RESP: normal effort, slight end expiratory wheezing on forceful exhalation  CV: regular rate and rhythm, normal S1 S2, no S3 or S4, no murmur, click or rub, no peripheral edema        ASSESSMENT/PLAN:                   "                                      1. Essential hypertension    Due for lab check, BP controlled on current med- will continue.     - lisinopril-hydrochlorothiazide (PRINZIDE/ZESTORETIC) 10-12.5 MG per tablet; Take 1 tablet by mouth daily (Needs follow-up appointment for this medication)  Dispense: 90 tablet; Refill: 3  - Basic metabolic panel; Future    2. Hyperlipidemia LDL goal <130    Due for labs, refill provided.    - atorvastatin (LIPITOR) 10 MG tablet; Take 1 tablet (10 mg) by mouth daily  Dispense: 90 tablet; Refill: 3  - **Lipid panel reflex to direct LDL FUTURE anytime; Future  - **ALT FUTURE anytime; Future    3. Moderate persistent asthma, uncomplicated  4. Seasonal allergic rhinitis due to pollen    Having a mild flare of respiratory symptoms.  Very mild end expiration wheezes on exam today with forceful exhalation.  Markell says its been awhile since he's seen an allergist and he is interested in meeting with one of our providers here and seeing if they have new recommendations or if he could potentially resume allergy injections- referral placed.     - beclomethasone (QVAR) 80 MCG/ACT Inhaler; Inhale 1 puff into the lungs 2 times daily 1 puff each Nare  with adapter BID  (uses NASAL)  Dispense: 3 Inhaler; Refill: 3  - fluticasone-salmeterol (ADVAIR DISKUS) 500-50 MCG/DOSE diskus inhaler; Inhale 1 puff into the lungs 2 times daily  Dispense: 3 Inhaler; Refill: 3  - ALLERGY/ASTHMA ADULT REFERRAL    5. Special screening for malignant neoplasms, colon    - GASTROENTEROLOGY ADULT REF PROCEDURE ONLY      Toñito Ramírez MD  [facility]

## 2017-04-25 NOTE — NURSING NOTE
"Chief Complaint   Patient presents with     Hypertension     refill Lisinopril     Referral     colonoscopy       Initial /77 (BP Location: Left arm, Patient Position: Chair, Cuff Size: Adult Large)  Pulse 85  Temp 98.5  F (36.9  C) (Tympanic)  Ht 5' 9\" (1.753 m)  Wt 218 lb (98.9 kg)  SpO2 96%  BMI 32.19 kg/m2 Estimated body mass index is 32.19 kg/(m^2) as calculated from the following:    Height as of this encounter: 5' 9\" (1.753 m).    Weight as of this encounter: 218 lb (98.9 kg).  Medication Reconciliation: complete   Diana FAULKNER CMA (AAMA)    "

## 2017-04-25 NOTE — MR AVS SNAPSHOT
After Visit Summary   4/25/2017    Markell Ramsey    MRN: [id number]           Patient Information     Date Of Birth          1960        Visit Information        Provider Department      4/25/2017 2:00 PM Toñito Ramírez MD [facility]        Today's Diagnoses     Seasonal allergic rhinitis due to pollen    -  1    Essential hypertension        Hyperlipidemia LDL goal <130        Moderate persistent asthma, uncomplicated           Follow-ups after your visit        Additional Services     ALLERGY/ASTHMA ADULT REFERRAL       Your provider has referred you to: G: [facility] [phone] [email or web address]    Please be aware that coverage of these services is subject to the terms and limitations of your health insurance plan.  Call member services at your health plan with any benefit or coverage questions.      Please bring the following with you to your appointment:    (1) Any X-Rays, CTs or MRIs which have been performed.  Contact the facility where they were done to arrange for  prior to your scheduled appointment.    (2) List of current medications  (3) This referral request   (4) Any documents/labs given to you for this referral                  Future tests that were ordered for you today     Open Future Orders        Priority Expected Expires Ordered    Basic metabolic panel Routine  4/25/2018 4/25/2017    **Lipid panel reflex to direct LDL FUTURE anytime Routine 4/25/2017 4/25/2018 4/25/2017    **ALT FUTURE anytime Routine 4/25/2017 4/25/2018 4/25/2017            Who to contact     If you have questions or need follow up information about today's clinic visit or your schedule please contact [facility] directly at [phone].  Normal or non-critical lab and imaging results will be communicated to you by MyChart, letter or phone within 4 business days after the clinic has received the results. If you do not hear from  "us within 7 days, please contact the clinic through [employer] or phone. If you have a critical or abnormal lab result, we will notify you by phone as soon as possible.  Submit refill requests through [employer] or call your pharmacy and they will forward the refill request to us. Please allow 3 business days for your refill to be completed.          Additional Information About Your Visit        [email or web address] Information     [employer] gives you secure access to your electronic health record. If you see a primary care provider, you can also send messages to your care team and make appointments. If you have questions, please call your primary care clinic.  If you do not have a primary care provider, please call [phone] and they will assist you.        Care EveryWhere ID     This is your Care EveryWhere ID. This could be used by other organizations to access your [facility] medical records  [id number]        Your Vitals Were     Pulse Temperature Height Pulse Oximetry BMI (Body Mass Index)       85 98.5  F (36.9  C) (Tympanic) 5' 9\" (1.753 m) 96% 32.19 kg/m2        Blood Pressure from Last 3 Encounters:   04/25/17 134/77   04/04/17 120/83   02/15/17 133/71    Weight from Last 3 Encounters:   04/25/17 218 lb (98.9 kg)   04/04/17 215 lb (97.5 kg)   02/15/17 217 lb 12.8 oz (98.8 kg)              We Performed the Following     ALLERGY/ASTHMA ADULT REFERRAL          Today's Medication Changes          These changes are accurate as of: 4/25/17  2:25 PM.  If you have any questions, ask your nurse or doctor.               These medicines have changed or have updated prescriptions.        Dose/Directions    beclomethasone 80 MCG/ACT Inhaler   Commonly known as:  QVAR   This may have changed:    - how much to take  - how to take this  - when to take this   Used for:  Moderate persistent asthma, uncomplicated   Changed by:  Toñito Ramírez MD        Dose:  1 puff   Inhale 1 puff into the lungs 2 times daily 1 puff each Nare  with adapter " BID  (uses NASAL)   Quantity:  3 Inhaler   Refills:  3       omeprazole 20 MG CR capsule   Commonly known as:  priLOSEC   This may have changed:  when to take this   Used for:  GERD (gastroesophageal reflux disease)        Dose:  20 mg   Take 1 capsule by mouth 2 times daily.   Quantity:  180 capsule   Refills:  3            Where to get your medicines      These medications were sent to [facility] [address] [facility] [address] [facility] AT [facility] OF [address] [place] [address]  [address] [place] [address]     Phone:  [phone]     atorvastatin 10 MG tablet    beclomethasone 80 MCG/ACT Inhaler    fluticasone-salmeterol 500-50 MCG/DOSE diskus inhaler    lisinopril-hydrochlorothiazide 10-12.5 MG per tablet                Primary Care Provider Office Phone # Fax #    Mary LezamaDO [phone]       [facility] [address] [facility] [address]        Thank you!     Thank you for choosing [facility]  for your care. Our goal is always to provide you with excellent care. Hearing back from our patients is one way we can continue to improve our services. Please take a few minutes to complete the written survey that you may receive in the mail after your visit with us. Thank you!             Your Updated Medication List - Protect others around you: Learn how to safely use, store and throw away your medicines at www.disposemymeds.org.          This list is accurate as of: 4/25/17  2:25 PM.  Always use your most recent med list.                   Brand Name Dispense Instructions for use    * albuterol (2.5 MG/3ML) 0.083% neb solution     30 vial    Take 1 vial (2.5 mg) by nebulization every 4 hours as needed for shortness of breath / dyspnea       * albuterol 108 (90 BASE) MCG/ACT Inhaler    VENTOLIN HFA    1 Inhaler    Inhale 2 puffs into the lungs every 4 hours as needed Take for shortness of breath, cough or wheeze. Take before exercise.        atorvastatin 10 MG tablet    LIPITOR    90 tablet    Take 1 tablet (10 mg) by mouth daily       beclomethasone 80 MCG/ACT Inhaler    QVAR    3 Inhaler    Inhale 1 puff into the lungs 2 times daily 1 puff each Nare  with adapter BID  (uses NASAL)       fexofenadine 180 MG tablet    ALLEGRA    30 tablet    Take 1 tablet (180 mg) by mouth daily       fluticasone-salmeterol 500-50 MCG/DOSE diskus inhaler    ADVAIR DISKUS    3 Inhaler    Inhale 1 puff into the lungs 2 times daily       lisinopril-hydrochlorothiazide 10-12.5 MG per tablet    PRINZIDE/ZESTORETIC    90 tablet    Take 1 tablet by mouth daily (Needs follow-up appointment for this medication)       montelukast 10 MG tablet    SINGULAIR    90 tablet    Take 1 tablet (10 mg) by mouth At Bedtime       nebulizer/tubing/mouthpiece Kit     1 kit    use with nebulizer       omeprazole 20 MG CR capsule    priLOSEC    180 capsule    Take 1 capsule by mouth 2 times daily.       * Notice:  This list has 2 medication(s) that are the same as other medications prescribed for you. Read the directions carefully, and ask your doctor or other care provider to review them with you.

## 2017-04-26 ENCOUNTER — TELEPHONE (OUTPATIENT)
Dept: FAMILY MEDICINE | Facility: CLINIC | Age: 57
End: 2017-04-26

## 2017-04-26 DIAGNOSIS — J45.40 MODERATE PERSISTENT ASTHMA, UNCOMPLICATED: Chronic | ICD-10-CM

## 2017-04-26 DIAGNOSIS — J30.1 SEASONAL ALLERGIC RHINITIS DUE TO POLLEN: Primary | ICD-10-CM

## 2017-04-27 NOTE — TELEPHONE ENCOUNTER
PA for qvar completed at [facility] med and faxed to Janice - will await response    Id number [id number]  [phone]

## 2017-05-02 NOTE — TELEPHONE ENCOUNTER
Routed back to Taryn for list of formulary alternatives for steroid nasal sprays please.  Thank you.  Rachel Valladares RN

## 2017-05-02 NOTE — TELEPHONE ENCOUNTER
PA for Qvar has been denied.  Patient needs to have tried and failed Arnuity Ellipta and Asmanex prior to approval of Qvar.

## 2017-05-03 RX ORDER — FLUTICASONE PROPIONATE 50 MCG
1-2 SPRAY, SUSPENSION (ML) NASAL DAILY
Qty: 3 BOTTLE | Refills: 11 | Status: SHIPPED | OUTPATIENT
Start: 2017-05-03 | End: 2018-06-13

## 2017-05-03 NOTE — TELEPHONE ENCOUNTER
I believe these are in the correct drug class (?).   [email or web address]/en/medicines.html#find-medicine

## 2017-09-18 ENCOUNTER — TELEPHONE (OUTPATIENT)
Dept: FAMILY MEDICINE | Facility: CLINIC | Age: 57
End: 2017-09-18

## 2017-09-18 NOTE — TELEPHONE ENCOUNTER
Panel Management Review      Patient has the following on his problem list:     Asthma review     ACT Total Scores 2/15/2017   ACT TOTAL SCORE -   ASTHMA ER VISITS -   ASTHMA HOSPITALIZATIONS -   ACT TOTAL SCORE (Goal Greater than or Equal to 20) 17   In the past 12 months, how many times did you visit the emergency room for your asthma without being admitted to the hospital? 0   In the past 12 months, how many times were you hospitalized overnight because of your asthma? 0      1. Is Asthma diagnosis on the Problem List? Yes    2. Is Asthma listed on Health Maintenance? Yes    3. Patient is due for:  ACT    Hypertension   Last three blood pressure readings:  BP Readings from Last 3 Encounters:   04/25/17 134/77   04/04/17 120/83   02/15/17 133/71     Blood pressure: Passed    HTN Guidelines:  Age 18-59 BP range:  Less than 140/90  Age 60-85 with Diabetes:  Less than 140/90  Age 60-85 without Diabetes:  less than 150/90          Composite cancer screening  Chart review shows that this patient is due/due soon for the following Colonoscopy   Summary:    Patient is due/failing the following:   ACT and COLONOSCOPY    Action needed:   Patient needs to do ACT. and Patient needs referral/order: colonoscopy      Type of outreach:    Sent [employer] message. Sending ACT by mail, w/ return envelope as well.       Questions for provider review:    None                                                                                                                                   Diana FAULKNER CMA ([facility])

## 2017-09-21 ENCOUNTER — OFFICE VISIT (OUTPATIENT)
Dept: FAMILY MEDICINE | Facility: CLINIC | Age: 57
End: 2017-09-21
Payer: COMMERCIAL

## 2017-09-21 VITALS
TEMPERATURE: 99.2 F | HEART RATE: 93 BPM | BODY MASS INDEX: 32.14 KG/M2 | SYSTOLIC BLOOD PRESSURE: 112 MMHG | HEIGHT: 69 IN | WEIGHT: 217 LBS | OXYGEN SATURATION: 96 % | DIASTOLIC BLOOD PRESSURE: 89 MMHG

## 2017-09-21 DIAGNOSIS — J20.8 ACUTE BACTERIAL BRONCHITIS: ICD-10-CM

## 2017-09-21 DIAGNOSIS — B96.89 ACUTE BACTERIAL BRONCHITIS: ICD-10-CM

## 2017-09-21 DIAGNOSIS — Z23 NEED FOR PROPHYLACTIC VACCINATION AND INOCULATION AGAINST INFLUENZA: ICD-10-CM

## 2017-09-21 DIAGNOSIS — B96.89 BACTERIAL CONJUNCTIVITIS OF BOTH EYES: ICD-10-CM

## 2017-09-21 DIAGNOSIS — J45.40 MODERATE PERSISTENT ASTHMA, UNCOMPLICATED: Chronic | ICD-10-CM

## 2017-09-21 DIAGNOSIS — H10.9 BACTERIAL CONJUNCTIVITIS OF BOTH EYES: ICD-10-CM

## 2017-09-21 DIAGNOSIS — D80.4 IGM DEFICIENCY (H): Primary | Chronic | ICD-10-CM

## 2017-09-21 PROCEDURE — 90471 IMMUNIZATION ADMIN: CPT | Performed by: INTERNAL MEDICINE

## 2017-09-21 PROCEDURE — 90686 IIV4 VACC NO PRSV 0.5 ML IM: CPT | Performed by: INTERNAL MEDICINE

## 2017-09-21 PROCEDURE — 99214 OFFICE O/P EST MOD 30 MIN: CPT | Mod: 25 | Performed by: INTERNAL MEDICINE

## 2017-09-21 RX ORDER — MONTELUKAST SODIUM 10 MG/1
10 TABLET ORAL AT BEDTIME
Qty: 90 TABLET | Refills: 3 | Status: SHIPPED | OUTPATIENT
Start: 2017-09-21 | End: 2018-07-23

## 2017-09-21 RX ORDER — AZITHROMYCIN 250 MG/1
TABLET, FILM COATED ORAL
Qty: 6 TABLET | Refills: 0 | Status: SHIPPED | OUTPATIENT
Start: 2017-09-21 | End: 2017-11-03

## 2017-09-21 RX ORDER — POLYMYXIN B SULFATE AND TRIMETHOPRIM 1; 10000 MG/ML; [USP'U]/ML
1 SOLUTION OPHTHALMIC 4 TIMES DAILY
Qty: 2 ML | Refills: 0 | Status: SHIPPED | OUTPATIENT
Start: 2017-09-21 | End: 2017-09-28

## 2017-09-21 RX ORDER — ALBUTEROL SULFATE 90 UG/1
2 AEROSOL, METERED RESPIRATORY (INHALATION) EVERY 4 HOURS PRN
Qty: 1 INHALER | Refills: 11 | Status: SHIPPED | OUTPATIENT
Start: 2017-09-21 | End: 2019-10-30

## 2017-09-21 NOTE — PROGRESS NOTES
Injectable Influenza Immunization Documentation    1.  Is the person to be vaccinated sick today?   No    2. Does the person to be vaccinated have an allergy to a component   of the vaccine?   No    3. Has the person to be vaccinated ever had a serious reaction   to influenza vaccine in the past?   No    4. Has the person to be vaccinated ever had Guillain-Barré syndrome?   No    Form completed by Cass Gonzalez

## 2017-09-21 NOTE — PATIENT INSTRUCTIONS
Thank you for choosing [facility].  You may be receiving a survey in the mail from Migel Walker regarding your visit today.  Please take a few minutes to complete and return the survey to let us know how we are doing.      If you have questions or concerns, please contact us via [employer] or you can contact your care team at [phone].    Our Clinic hours are:  Monday 6:40 am  to 7:00 pm  Tuesday -Friday 6:40 am to 5:00 pm    The [place] outpatient lab hours are:  Monday - Friday 6:10 am to 4:45 pm  Saturdays 7:00 am to 11:00 am  Appointments are required, call [phone]    If you have clinical questions after hours or would like to schedule an appointment,  call the clinic at [phone].         [facility] [address] [place] [address] [address] AT [address]          Conjunctivitis, Bacterial    You have an infection in the membranes covering the white part of the eye. This part of the eye is called the conjunctiva. The infection is called conjunctivitis. The most common symptoms of conjunctivitis include a thick, pus-like discharge from the eye, swollen eyelids, redness, eyelids sticking together upon awakening, and a gritty or scratchy feeling in the eye. Your infection was caused by bacteria. It may be treated with medicine. With treatment, the infection takes about 7 to 10 days to resolve.  Home care    Use prescribed antibiotic eye drops or ointment as directed to treat the infection.    Apply a warm compress (towel soaked in warm water) to the affected eye 3 to 4 times a day. Do this just before applying medicine to the eye.    Use a warm, wet cloth to wipe away crusting of the eyelids in the morning. This is caused by mucus drainage during the night. You may also use saline irrigating solution or artificial tears to rinse away mucus in the eye. Do not put a patch over the eye.    Wash your hands before and after touching the infected eye. This is to prevent  spreading the infection to the other eye, and to other people. Do not share your towels or washcloths with others.    You may use acetaminophen or ibuprofen to control pain, unless another medicine was prescribed. (Note: If you have chronic liver or kidney disease or have ever had a stomach ulcer or gastrointestinal bleeding, talk with your doctor before using these medicines.)    Do not wear contact lenses until your eyes have healed and all symptoms are gone.  Follow-up care  Follow up with your healthcare provider, or as advised.  When to seek medical advice  Call your healthcare provider right away if any of these occur:    Worsening vision    Increasing pain in the eye    Increasing swelling or redness of the eyelid    Redness spreading around the eye  Date Last Reviewed: 6/14/2015 2000-2017 [employer]. [address] [place] [address], [place] [address]. All rights reserved. This information is not intended as a substitute for professional medical care. Always follow your healthcare professional's instructions.

## 2017-09-21 NOTE — LETTER
My Asthma Action Plan  Name: Markell Ramsey   YOB: 1960  Date: 9/21/2017   My doctor: Mary Lezama, DO   My clinic: [facility]        My Control Medicine: Fluticasone + salmeterol (Advair) -  Diskus 500/50 mcg twice daily  My Rescue Medicine: Albuterol (Proair/Ventolin/Proventil) inhaler as needed   My Asthma Severity: moderate persistent  Avoid your asthma triggers:                GREEN ZONE   Good Control    I feel good    No cough or wheeze    Can work, sleep and play without asthma symptoms       Take your asthma control medicine every day.     1. If exercise triggers your asthma, take your rescue medication    15 minutes before exercise or sports, and    During exercise if you have asthma symptoms  2. Spacer to use with inhaler: If you have a spacer, make sure to use it with your inhaler             YELLOW ZONE Getting Worse  I have ANY of these:    I do not feel good    Cough or wheeze    Chest feels tight    Wake up at night   1. Keep taking your Green Zone medications  2. Start taking your rescue medicine:    every 20 minutes for up to 1 hour. Then every 4 hours for 24-48 hours.  3. If you stay in the Yellow Zone for more than 12-24 hours, contact your doctor.  4. If you do not return to the Green Zone in 12-24 hours or you get worse, start taking your oral steroid medicine if prescribed by your provider.           RED ZONE Medical Alert - Get Help  I have ANY of these:    I feel awful    Medicine is not helping    Breathing getting harder    Trouble walking or talking    Nose opens wide to breathe       1. Take your rescue medicine NOW  2. If your provider has prescribed an oral steroid medicine, start taking it NOW  3. Call your doctor NOW  4. If you are still in the Red Zone after 20 minutes and you have not reached your doctor:    Take your rescue medicine again and    Call 911 or go to the emergency room right away    See your regular doctor within 2 weeks of an Emergency  Room or Urgent Care visit for follow-up treatment.        Electronically signed by: Mary Lezama, September 21, 2017    Annual Reminders:  Meet with Asthma Educator,  Flu Shot in the Fall, consider Pneumonia Vaccination for patients with asthma (aged 19 and older).    Pharmacy:    [id number] - [facility] [address]  [facility] - [facility] [address] AT [address] TO REG[address] [place] [address]  [id number] [place] [place] [facility] [address] [facility]                    Asthma Triggers  How To Control Things That Make Your Asthma Worse    Triggers are things that make your asthma worse.  Look at the list below to help you find your triggers and what you can do about them.  You can help prevent asthma flare-ups by staying away from your triggers.      Trigger                                                          What you can do   Cigarette Smoke  Tobacco smoke can make asthma worse. Do not allow smoking in your home, car or around you.  Be sure no one smokes at a child s day care or school.  If you smoke, ask your health care provider for ways to help you quit.  Ask family members to quit too.  Ask your health care provider for a referral to Quit Plan to help you quit smoking, or call 8-040-956-PLAN.     Colds, Flu, Bronchitis  These are common triggers of asthma. Wash your hands often.  Don t touch your eyes, nose or mouth.  Get a flu shot every year.     Dust Mites  These are tiny bugs that live in cloth or carpet. They are too small to see. Wash sheets and blankets in hot water every week.   Encase pillows and mattress in dust mite proof covers.  Avoid having carpet if you can. If you have carpet, vacuum weekly.   Use a dust mask and HEPA vacuum.   Pollen and Outdoor Mold  Some people are allergic to trees, grass, or weed pollen, or molds. Try to keep your windows closed.  Limit time out doors when pollen count is  high.   Ask you health care provider about taking medicine during allergy season.     Animal Dander  Some people are allergic to skin flakes, urine or saliva from pets with fur or feathers. Keep pets with fur or feathers out of your home.    If you can t keep the pet outdoors, then keep the pet out of your bedroom.  Keep the bedroom door closed.  Keep pets off cloth furniture and away from stuffed toys.     Mice, Rats, and Cockroaches  Some people are allergic to the waste from these pests.   Cover food and garbage.  Clean up spills and food crumbs.  Store grease in the refrigerator.   Keep food out of the bedroom.   Indoor Mold  This can be a trigger if your home has high moisture. Fix leaking faucets, pipes, or other sources of water.   Clean moldy surfaces.  Dehumidify basement if it is damp and smelly.   Smoke, Strong Odors, and Sprays  These can reduce air quality. Stay away from strong odors and sprays, such as perfume, powder, hair spray, paints, smoke incense, paint, cleaning products, candles and new carpet.   Exercise or Sports  Some people with asthma have this trigger. Be active!  Ask your doctor about taking medicine before sports or exercise to prevent symptoms.    Warm up for 5-10 minutes before and after sports or exercise.     Other Triggers of Asthma  Cold air:  Cover your nose and mouth with a scarf.  Sometimes laughing or crying can be a trigger.  Some medicines and food can trigger asthma.

## 2017-09-21 NOTE — NURSING NOTE
"Chief Complaint   Patient presents with     Cough     started 8/31 and then gone now started back today.     Eye Problem     started yesterday red, mattery and puffy.       Imm/Inj     flu shot today given      Health Maintenance     will schedule colonoscopy and started prep but got sick.  So when better will do.        Initial /89  Pulse 93  Temp 99.2  F (37.3  C) (Tympanic)  Ht 5' 9\" (1.753 m)  Wt 217 lb (98.4 kg)  SpO2 96%  BMI 32.05 kg/m2 Estimated body mass index is 32.05 kg/(m^2) as calculated from the following:    Height as of this encounter: 5' 9\" (1.753 m).    Weight as of this encounter: 217 lb (98.4 kg).  Medication Reconciliation: complete    "

## 2017-09-21 NOTE — PROGRESS NOTES
"  SUBJECTIVE:   Markell Ramsey is a 57 year old male who presents to clinic today for the following health issues:    Chief Complaint   Patient presents with     Cough     started 8/31 and then gone now started back today.     Eye Problem     started yesterday red, mattery and puffy.       Imm/Inj     flu shot today given      Health Maintenance     will schedule colonoscopy and started prep but got sick.  So when better will do. Gave Honoring choices.      ENT Symptoms             Symptoms: cc Present Absent Comment   Fever/Chills   x    Fatigue   x    Muscle Aches   x    Eye Irritation  x     Sneezing  x     Nasal Jeronimo/Drg  x     Sinus Pressure/Pain  x     Loss of smell  x     Dental pain   x    Sore Throat   x    Swollen Glands   x    Ear Pain/Fullness  x     Cough  x  Productive olive green   Wheeze  x     Chest Pain  x     Shortness of breath   x    Rash   x    Other   x      Symptom duration:  2 days   Symptom severity:  worse due to eyes and cough   Treatments tried:  inhalers    Contacts:     History mod asthma, IgM def, allergic rhinitis.  He last saw allergy in 2014, who had him take azithromycin Q 7 days to reduce URI.    His former allergy doctor had him use high dose inhaled flovent to help with URI symptoms.      Yesterday noted what he thinks is pink eye.  Having significant drainage throughout the day and night, with significant mattering in AM.  Significant erythema, swelling of bilateral eyes.  Mild light sensitivity, no eye pain.  No vision changes.    URI started 8/31 with cough productive of green sputum, and nasal drainage with brown sputum.  He notes nasal qvar is much better than flonase nasal spray.  He started his \"Dr. Whittington (former allergy doctor) treatment\" of  qvar nasal twice daily which helped with nasal symptoms and the cough.  These symptoms resolved, then returned this AM again.  No wheezing or shortness of breath.  Has mild shortness of breath associated with coughing fits.  Is not " feeling like he needs albuterol inhaler much, a few times/week.    He reports allergies are well controlled.  He uses allegra and Singulair seasonally, which is currently.  Doesn't use in the winter.         Current Outpatient Prescriptions   Medication Sig Dispense Refill     fluticasone (FLONASE) 50 MCG/ACT spray Spray 1-2 sprays into both nostrils daily 3 Bottle 11     lisinopril-hydrochlorothiazide (PRINZIDE/ZESTORETIC) 10-12.5 MG per tablet Take 1 tablet by mouth daily (Needs follow-up appointment for this medication) 90 tablet 3     atorvastatin (LIPITOR) 10 MG tablet Take 1 tablet (10 mg) by mouth daily 90 tablet 3     fluticasone-salmeterol (ADVAIR DISKUS) 500-50 MCG/DOSE diskus inhaler Inhale 1 puff into the lungs 2 times daily 3 Inhaler 3     montelukast (SINGULAIR) 10 MG tablet Take 1 tablet (10 mg) by mouth At Bedtime 90 tablet 0     albuterol (VENTOLIN HFA) 108 (90 BASE) MCG/ACT inhaler Inhale 2 puffs into the lungs every 4 hours as needed Take for shortness of breath, cough or wheeze. Take before exercise. 1 Inhaler 11     fexofenadine (ALLEGRA) 180 MG tablet Take 1 tablet (180 mg) by mouth daily 30 tablet 11     omeprazole (PRILOSEC) 20 MG capsule Take 1 capsule by mouth 2 times daily. (Patient taking differently: Take 20 mg by mouth daily ) 180 capsule 3     albuterol (2.5 MG/3ML) 0.083% nebulizer solution Take 1 vial (2.5 mg) by nebulization every 4 hours as needed for shortness of breath / dyspnea (Patient not taking: Reported on 9/21/2017) 30 vial 11     Respiratory Therapy Supplies (NEBULIZER/TUBING/MOUTHPIECE) KIT use with nebulizer 1 kit 11       Reviewed and updated as needed this visit by clinical staffTobacco  Allergies  Meds  Problems       Reviewed and updated as needed this visit by Provider  Allergies  Meds  Problems         ROS:  Constitutional, HEENT, cardiovascular, pulmonary, gi and gu systems are negative, except as otherwise noted.      OBJECTIVE:   /89  Pulse 93   "Temp 99.2  F (37.3  C) (Tympanic)  Ht 5' 9\" (1.753 m)  Wt 217 lb (98.4 kg)  SpO2 96%  BMI 32.05 kg/m2  Body mass index is 32.05 kg/(m^2).  GENERAL APPEARANCE: healthy, alert and no distress  EYES: significant conjunctival injection bilateral, moderate mattering/purulent drainage seen.  PERRLA  HENT: ear canals and TM's normal and nose and mouth without ulcers or lesions  NECK: no adenopathy, no asymmetry, masses, or scars and thyroid normal to palpation  RESP: wheezing with coughing, lungs otherwise clear  CV: regular rates and rhythm, normal S1 S2, no S3 or S4 and no murmur, click or rub      ASSESSMENT/PLAN:     1. Moderate persistent asthma, uncomplicated - no flare up currently, so will not Rx for oral steroids.  Refills given  - montelukast (SINGULAIR) 10 MG tablet; Take 1 tablet (10 mg) by mouth At Bedtime  Dispense: 90 tablet; Refill: 3  - albuterol (VENTOLIN HFA) 108 (90 BASE) MCG/ACT Inhaler; Inhale 2 puffs into the lungs every 4 hours as needed Take for shortness of breath, cough or wheeze. Take before exercise.  Dispense: 1 Inhaler; Refill: 11    2. 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  Symptoms consistent with bacterial URI and conjunctivitis in setting of known IgM def.  Patient agreeable to returning to care of allergy providers  - ALLERGY/ASTHMA ADULT REFERRAL  - azithromycin (ZITHROMAX) 250 MG tablet; Two tablets first day, then one tablet daily for four days.  Dispense: 6 tablet; Refill: 0    3. Acute bacterial bronchitis  - azithromycin (ZITHROMAX) 250 MG tablet; Two tablets first day, then one tablet daily for four days.  Dispense: 6 tablet; Refill: 0    4. Bacterial conjunctivitis of both eyes  - trimethoprim-polymyxin b (POLYTRIM) ophthalmic solution; Place 1 drop into both eyes 4 times daily for 7 days  Dispense: 2 mL; Refill: 0    5. Need for prophylactic vaccination and inoculation against influenza  - Vaccine Administration, Initial [35168]  - FLU VAC, SPLIT " VIRUS IM > 3 YO (QUADRIVALENT) [34650]      Mary Lezama,   [facility]

## 2017-09-22 ASSESSMENT — ASTHMA QUESTIONNAIRES: ACT_TOTALSCORE: 20

## 2017-11-03 ENCOUNTER — OFFICE VISIT (OUTPATIENT)
Dept: ALLERGY | Facility: CLINIC | Age: 57
End: 2017-11-03
Payer: COMMERCIAL

## 2017-11-03 VITALS
BODY MASS INDEX: 33.4 KG/M2 | SYSTOLIC BLOOD PRESSURE: 142 MMHG | OXYGEN SATURATION: 97 % | HEIGHT: 69 IN | TEMPERATURE: 98.6 F | DIASTOLIC BLOOD PRESSURE: 85 MMHG | HEART RATE: 101 BPM | WEIGHT: 225.53 LBS

## 2017-11-03 DIAGNOSIS — J34.2 DNS (DEVIATED NASAL SEPTUM): ICD-10-CM

## 2017-11-03 DIAGNOSIS — J31.0 OTHER CHRONIC RHINITIS: ICD-10-CM

## 2017-11-03 DIAGNOSIS — J45.40 MODERATE PERSISTENT ASTHMA, UNCOMPLICATED: Primary | Chronic | ICD-10-CM

## 2017-11-03 DIAGNOSIS — D80.4 IGM DEFICIENCY (H): Chronic | ICD-10-CM

## 2017-11-03 DIAGNOSIS — J32.8 OTHER CHRONIC SINUSITIS: ICD-10-CM

## 2017-11-03 LAB
CD19 CELLS # BLD: 208 CELLS/UL (ref 107–698)
CD19 CELLS NFR BLD: 11 % (ref 6–27)
CD3 CELLS # BLD: 1456 CELLS/UL (ref 603–2990)
CD3 CELLS NFR BLD: 77 % (ref 49–84)
CD3+CD4+ CELLS # BLD: 1107 CELLS/UL (ref 441–2156)
CD3+CD4+ CELLS NFR BLD: 59 % (ref 28–63)
CD3+CD4+ CELLS/CD3+CD8+ CLL BLD: 3.28 % (ref 1.4–2.6)
CD3+CD8+ CELLS # BLD: 338 CELLS/UL (ref 125–1312)
CD3+CD8+ CELLS NFR BLD: 18 % (ref 10–40)
CD3-CD16+CD56+ CELLS # BLD: 194 CELLS/UL (ref 95–640)
CD3-CD16+CD56+ CELLS NFR BLD: 10 % (ref 4–25)
FEF 25/75: NORMAL
FEV-1: NORMAL
FEV1/FVC: NORMAL
FVC: NORMAL
IFC SPECIMEN: ABNORMAL

## 2017-11-03 PROCEDURE — 86357 NK CELLS TOTAL COUNT: CPT | Performed by: ALLERGY & IMMUNOLOGY

## 2017-11-03 PROCEDURE — 82785 ASSAY OF IGE: CPT | Performed by: ALLERGY & IMMUNOLOGY

## 2017-11-03 PROCEDURE — 86774 TETANUS ANTIBODY: CPT | Performed by: ALLERGY & IMMUNOLOGY

## 2017-11-03 PROCEDURE — 94010 BREATHING CAPACITY TEST: CPT | Performed by: ALLERGY & IMMUNOLOGY

## 2017-11-03 PROCEDURE — 82784 ASSAY IGA/IGD/IGG/IGM EACH: CPT | Performed by: ALLERGY & IMMUNOLOGY

## 2017-11-03 PROCEDURE — 86359 T CELLS TOTAL COUNT: CPT | Performed by: ALLERGY & IMMUNOLOGY

## 2017-11-03 PROCEDURE — 82787 IGG 1 2 3 OR 4 EACH: CPT | Performed by: ALLERGY & IMMUNOLOGY

## 2017-11-03 PROCEDURE — 86317 IMMUNOASSAY INFECTIOUS AGENT: CPT | Mod: 90 | Performed by: ALLERGY & IMMUNOLOGY

## 2017-11-03 PROCEDURE — 86162 COMPLEMENT TOTAL (CH50): CPT | Mod: 90 | Performed by: ALLERGY & IMMUNOLOGY

## 2017-11-03 PROCEDURE — 82784 ASSAY IGA/IGD/IGG/IGM EACH: CPT | Mod: 91 | Performed by: ALLERGY & IMMUNOLOGY

## 2017-11-03 PROCEDURE — 86360 T CELL ABSOLUTE COUNT/RATIO: CPT | Performed by: ALLERGY & IMMUNOLOGY

## 2017-11-03 PROCEDURE — 86355 B CELLS TOTAL COUNT: CPT | Performed by: ALLERGY & IMMUNOLOGY

## 2017-11-03 PROCEDURE — 99245 OFF/OP CONSLTJ NEW/EST HI 55: CPT | Mod: 25 | Performed by: ALLERGY & IMMUNOLOGY

## 2017-11-03 RX ORDER — AZELASTINE 1 MG/ML
1 SPRAY, METERED NASAL 2 TIMES DAILY
Qty: 30 ML | Refills: 3 | Status: SHIPPED | OUTPATIENT
Start: 2017-11-03 | End: 2018-05-10

## 2017-11-03 ASSESSMENT — ENCOUNTER SYMPTOMS
EYE DISCHARGE: 0
RHINORRHEA: 0
FATIGUE: 0
HEADACHES: 0
CHEST TIGHTNESS: 0
EYE ITCHING: 0
SHORTNESS OF BREATH: 0
ACTIVITY CHANGE: 0
FEVER: 0
STRIDOR: 0
SINUS PRESSURE: 0
ADENOPATHY: 0
EYE REDNESS: 0
MYALGIAS: 0
DIARRHEA: 0
WHEEZING: 0
COUGH: 0
FACIAL SWELLING: 0
VOMITING: 0
CHILLS: 1
NAUSEA: 0

## 2017-11-03 NOTE — LETTER
My Asthma Action Plan  Name: Markell Ramsey   YOB: 1960  Date: 11/3/2017   My doctor: Jose Angel Leiva MD   My clinic: [facility]        My Control Medicine: Fluticasone + salmeterol (Advair) -  Diskus 500/50 mcg 1 puff twice daily  Montelukast (Singulair) -  10 mg by mouth once a day  My Rescue Medicine: Albuterol nebulizer solution every 4 hrs as needed  Albuterol (Proair/Ventolin/Proventil) inhaler 2-4 puffs every 4 hrs as needed   My Asthma Severity: moderate persistent  Avoid your asthma triggers: upper respiratory infections               GREEN ZONE   Good Control    I feel good    No cough or wheeze    Can work, sleep and play without asthma symptoms       Take your asthma control medicine every day.     1. If exercise triggers your asthma, take your rescue medication    15 minutes before exercise or sports, and    During exercise if you have asthma symptoms  2. Spacer to use with inhaler: If you have a spacer, make sure to use it with your inhaler             YELLOW ZONE Getting Worse  I have ANY of these:    I do not feel good    Cough or wheeze    Chest feels tight    Wake up at night   1. Keep taking your Green Zone medications  2. Start taking your rescue medicine:    every 20 minutes for up to 1 hour. Then every 4 hours for 24-48 hours.  3. If you stay in the Yellow Zone for more than 48  hours, contact your doctor.             RED ZONE Medical Alert - Get Help  I have ANY of these:    I feel awful    Medicine is not helping    Breathing getting harder    Trouble walking or talking    Nose opens wide to breathe       1. Take your rescue medicine NOW  2. If your provider has prescribed an oral steroid medicine, start taking it NOW  3. Call your doctor NOW  4. If you are still in the Red Zone after 20 minutes and you have not reached your doctor:    Take your rescue medicine again and    Call 911 or go to the emergency room right away    See your regular doctor within 2 weeks of an  Emergency Room or Urgent Care visit for follow-up treatment.        Electronically signed by: Jose Angel Leiva, November 3, 2017    Annual Reminders:  Meet with Asthma Educator,  Flu Shot in the Fall, consider Pneumonia Vaccination for patients with asthma (aged 19 and older).    Pharmacy:    [id number] - [facility] [address] AT [address]  [facility] - [facility] [address] AT [address] TO [address]  [id number] [place] [place] [facility] [address]                    Asthma Triggers  How To Control Things That Make Your Asthma Worse    Triggers are things that make your asthma worse.  Look at the list below to help you find your triggers and what you can do about them.  You can help prevent asthma flare-ups by staying away from your triggers.      Trigger                                                          What you can do   Cigarette Smoke  Tobacco smoke can make asthma worse. Do not allow smoking in your home, car or around you.  Be sure no one smokes at a child s day care or school.  If you smoke, ask your health care provider for ways to help you quit.  Ask family members to quit too.  Ask your health care provider for a referral to Quit Plan to help you quit smoking, or call 3-569-187-PLAN.     Colds, Flu, Bronchitis  These are common triggers of asthma. Wash your hands often.  Don t touch your eyes, nose or mouth.  Get a flu shot every year.     Dust Mites  These are tiny bugs that live in cloth or carpet. They are too small to see. Wash sheets and blankets in hot water every week.   Encase pillows and mattress in dust mite proof covers.  Avoid having carpet if you can. If you have carpet, vacuum weekly.   Use a dust mask and HEPA vacuum.   Pollen and Outdoor Mold  Some people are allergic to trees, grass, or weed pollen, or molds. Try to keep your windows closed.  Limit time out doors when pollen count is  high.   Ask you health care provider about taking medicine during allergy season.     Animal Dander  Some people are allergic to skin flakes, urine or saliva from pets with fur or feathers. Keep pets with fur or feathers out of your home.    If you can t keep the pet outdoors, then keep the pet out of your bedroom.  Keep the bedroom door closed.  Keep pets off cloth furniture and away from stuffed toys.     Mice, Rats, and Cockroaches  Some people are allergic to the waste from these pests.   Cover food and garbage.  Clean up spills and food crumbs.  Store grease in the refrigerator.   Keep food out of the bedroom.   Indoor Mold  This can be a trigger if your home has high moisture. Fix leaking faucets, pipes, or other sources of water.   Clean moldy surfaces.  Dehumidify basement if it is damp and smelly.   Smoke, Strong Odors, and Sprays  These can reduce air quality. Stay away from strong odors and sprays, such as perfume, powder, hair spray, paints, smoke incense, paint, cleaning products, candles and new carpet.   Exercise or Sports  Some people with asthma have this trigger. Be active!  Ask your doctor about taking medicine before sports or exercise to prevent symptoms.    Warm up for 5-10 minutes before and after sports or exercise.     Other Triggers of Asthma  Cold air:  Cover your nose and mouth with a scarf.  Sometimes laughing or crying can be a trigger.  Some medicines and food can trigger asthma.

## 2017-11-03 NOTE — PROGRESS NOTES
SUBJECTIVE:                                                                 Markell Ramsey presents today to our Allergy Clinic at [facility]; he is being seen in consultation at the request of Dr. Lezama.  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    At the age of 8 years, he began to have perennial but seasonally-exacerbated (Spring and Fall) chronic nasal symptoms (itch, clear rhinorrhea, stuffiness, and sneezing) and ocular symptoms (itching and watering).  He doesn't think he is worse around dogs/cats.   Intranasal steroids (Flonase, QVAR, Nasonex, Rhinocort and Nasacort) have been used, and they were partially effective. He found QVAR more helpful, but it stopped being covered by is insurance. He tried all mentioned above nasal sprays over the years, for more than 30 days in a row. Allegra is partially helpful. He uses it as needed. The patient states he doesn't need it in Winter. He hasn't tried I/nasal antihistamines.  The patient's current treatment regime includes Flonase.The patient symptoms are currently 60% controlled.  Reviewed the results of previous skin test. SPT in 2007 was positive to molds, tree pollen, grass mix, and ragweed. IDs were positive for cat, roaches,  dog, mite. Aspergillus mold, and marsh elder. Started by Dr. Whittington on allergen IT in 2007,  [facility] Whittington [facility] to practice before. He received allergen immunotherapy on and off until 2010, which he found partially helpful.     Sinus CT in 2013, with mild-moderate sinus disease, and DNS on the left. The patient confirms nasal congestion L>R.    IMPRESSION:  Mild/moderate mucosal thickening in the paranasal  sinuses as described above. Slight worsening of the right maxillary  sinus and worsening of the right frontal sinus since the  "previous  exam, but clearing of the left sphenoid sinus.      There is no history of PE tubes, sinus surgeries. He had tonsillectomy as a child.         In 2008 ch 50 was within normal limits. IgM was modestly low, 37 (), IgG 899, IgG 1 508 (382-929), IgG2 199 (241-700), IgG3 (), IgG4 (4-86).  Per Dr. Whittington, Pre and post-immunization strep pneumo titers showed > 4 fold increase to three serotypes.  7 serotypes were tested pre and post-vaccination.   At some point, he was on prophylactic antibiotics. Last several years, he did well without taking them.    However, in September, he developed discolored green, brown nasal mucus. Was treated with Z-justen, which was partially helpful. But several weeks ago, he developed \"kennedy brown\" PND again, sore throat, nasal congestion, and sinus pressure. He uses sinus rinses once-twice daily.       As a child, he began to have chronic chest symptoms  (cough, wheeze, SOB and chest tightness), triggered by URI. He doesn't think that allergies or exertion trigger his asthma.   Chest symptoms are acutely improved by albuterol use. The patient was on a lot of controllers, including theophylline. He has been on Advair 500/50 mcg 1 puff twice daily. He tried decreasing the dose several times, and it triggered asthma symptoms. These days, he doesn't use albuterol more than twice weekly. No night awakening due to chest symptoms for the last month. No h/o hospitalization for asthma. Had 2 episodes of pneumonia in his life that were CXR confirmed.  In February 2013, there is a chest x-ray with left perihilar infiltrate, right middle lobe and lingula.  He has been on oral steroids once or twice in the past year to treat an asthma exacerbation.  He has not needed urgent visits in  ER/UC for the last year. No missed work for the last year.  He uses a chamber device with an albuterol inhaler.      Patient Active Problem List   Diagnosis     Seasonal allergic rhinitis due to pollen     " Gastroesophageal reflux disease without esophagitis     Non morbid obesity due to excess calories     CARDIOVASCULAR SCREENING; LDL GOAL LESS THAN 160     IgM deficiency (H)     Moderate persistent asthma, uncomplicated     Essential hypertension     Hyperlipidemia LDL goal <130     DNS (deviated nasal septum)       Past Medical History:   Diagnosis Date     Acute bronchitis      Allergic rhinitis, cause unspecified 9/23/2008     Allergic rhinitis, unspecified allergic rhinitis type 9/23/2008     Problem list name updated by automated process. Provider to review     Esophageal reflux      Pneumonia 2/24/2013    MRSA, required hospitalization     Unspecified asthma(493.90)       Problem (# of Occurrences) Relation (Name,Age of Onset)    Allergies (1) Mother: asthma    CANCER (1) Father: lung    Respiratory (3) Sister: asthma, Son (Ludante): Upper Resp infections, Son (David): asthma        Past Surgical History:   Procedure Laterality Date     C APPENDECTOMY  08/2006     SURGICAL HISTORY OF -   2/23/06    L4-5 microdiscectomy     TONSILLECTOMY & ADENOIDECTOMY       Social History     Social History     Marital status:      Spouse name: N/A     Number of children: N/A     Years of education: N/A     Social History Main Topics     Smoking status: Never Smoker     Smokeless tobacco: Never Used     Alcohol use Yes      Comment: rare     Drug use: No     Sexual activity: Not Asked     Other Topics Concern     None     Social History Narrative    November 3, 2017        ENVIRONMENTAL HISTORY: [place] The home is heated with a forced air and gas furnace. They does have central air conditioning. The patient's bedroom is furnished with hard naseem in bedroom, allergen mattress cover and allergen pillowcase cover. No pets inside the house. There is no history of cockroach or mice infestation. There are no smokers in the house.  The house does not have a damp basement.                 Review of Systems   Constitutional: Positive for chills. Negative for activity change, fatigue and fever.   HENT: Positive for congestion and postnasal drip. Negative for ear pain, facial swelling, nosebleeds, rhinorrhea, sinus pressure and sneezing.    Eyes: Negative for discharge, redness and itching.   Respiratory: Negative for cough, chest tightness, shortness of breath, wheezing and stridor.    Gastrointestinal: Negative for diarrhea, nausea and vomiting.   Musculoskeletal: Negative for myalgias.   Skin: Negative for rash.   Allergic/Immunologic: Positive for environmental allergies.   Neurological: Negative for headaches.   Hematological: Negative for adenopathy.   Psychiatric/Behavioral: Negative for behavioral problems and self-injury.           Current Outpatient Prescriptions:      Beclomethasone Dipropionate (QNASL) 80 MCG/ACT AERS Nasal Spray, Spray 1-2 sprays into both nostrils daily, Disp: 8.7 g, Rfl: 3     azelastine (ASTELIN) 0.1 % spray, Spray 1 spray into both nostrils 2 times daily, Disp: 30 mL, Rfl: 3     amoxicillin-clavulanate (AUGMENTIN) 875-125 MG per tablet, Take 1 tablet by mouth 2 times daily for 21 days, Disp: 42 tablet, Rfl: 0     montelukast (SINGULAIR) 10 MG tablet, Take 1 tablet (10 mg) by mouth At Bedtime, Disp: 90 tablet, Rfl: 3     lisinopril-hydrochlorothiazide (PRINZIDE/ZESTORETIC) 10-12.5 MG per tablet, Take 1 tablet by mouth daily (Needs follow-up appointment for this medication), Disp: 90 tablet, Rfl: 3     atorvastatin (LIPITOR) 10 MG tablet, Take 1 tablet (10 mg) by mouth daily, Disp: 90 tablet, Rfl: 3     fluticasone-salmeterol (ADVAIR DISKUS) 500-50 MCG/DOSE diskus inhaler, Inhale 1 puff into the lungs 2 times daily, Disp: 3 Inhaler, Rfl: 3     fexofenadine (ALLEGRA) 180 MG tablet, Take 1 tablet (180 mg) by mouth daily, Disp: 30 tablet, Rfl: 11     omeprazole (PRILOSEC) 20 MG capsule, Take 1 capsule by mouth 2 times daily. (Patient taking differently: Take 20 mg  "by mouth daily ), Disp: 180 capsule, Rfl: 3     albuterol (VENTOLIN HFA) 108 (90 BASE) MCG/ACT Inhaler, Inhale 2 puffs into the lungs every 4 hours as needed Take for shortness of breath, cough or wheeze. Take before exercise. (Patient not taking: Reported on 11/3/2017), Disp: 1 Inhaler, Rfl: 11     fluticasone (FLONASE) 50 MCG/ACT spray, Spray 1-2 sprays into both nostrils daily (Patient not taking: Reported on 11/3/2017), Disp: 3 Bottle, Rfl: 11     albuterol (2.5 MG/3ML) 0.083% nebulizer solution, Take 1 vial (2.5 mg) by nebulization every 4 hours as needed for shortness of breath / dyspnea (Patient not taking: Reported on 9/21/2017), Disp: 30 vial, Rfl: 11     Respiratory Therapy Supplies (NEBULIZER/TUBING/MOUTHPIECE) KIT, use with nebulizer (Patient not taking: Reported on 11/3/2017), Disp: 1 kit, Rfl: 11  Immunization History   Administered Date(s) Administered     Influenza (H1N1) 01/11/2010     Influenza (IIV3) 10/29/2008, 08/26/2009, 10/05/2010     Influenza Vaccine IM 3yrs+ 4 Valent IIV4 10/23/2015, 09/21/2017     Pneumococcal 23 valent 01/29/2016     TDAP Vaccine (Adacel) 11/18/2008     No Known Allergies  OBJECTIVE:                                                                 /85 (BP Location: Left arm, Patient Position: Chair, Cuff Size: Adult Large)  Pulse 101  Temp 98.6  F (37  C) (Tympanic)  Ht 1.753 m (5' 9.02\")  Wt 102.3 kg (225 lb 8.5 oz)  SpO2 97%  BMI 33.29 kg/m2        Physical Exam   Constitutional: No distress.   obese   HENT:   Head: Normocephalic and atraumatic.   Right Ear: Tympanic membrane, external ear and ear canal normal.   Left Ear: Tympanic membrane, external ear and ear canal normal.   Nose: Mucosal edema (mild), rhinorrhea (mucoid bilaterally) and septal deviation (Septal spur on the left noted.) present.   Mouth/Throat: Oropharynx is clear and moist. No oropharyngeal exudate.   Eyes: Conjunctivae are normal. Right eye exhibits no discharge. Left eye exhibits no " discharge.   Neck: Normal range of motion.   Cardiovascular: Normal rate, regular rhythm and normal heart sounds.    No murmur heard.  Pulmonary/Chest: Effort normal and breath sounds normal. No respiratory distress. He has no wheezes. He has no rales.   Musculoskeletal: Normal range of motion.   Neurological: He is alert.   Skin: Skin is warm. He is not diaphoretic.   Psychiatric: Affect normal.   Nursing note and vitals reviewed.        WORKUP:     SPIROMETRY       FVC 4.28L (92% of predicted).     FEV1 3.44L (94% of predicted).     FEV1/FVC 80%     FEF 25%-75%  3.32L/s (104% of predicted)    The office spirometry performed today doesn't suggest an obstruction.        ASSESSMENT/PLAN:    Problem List Items Addressed This Visit        Respiratory    1. Moderate persistent asthma, uncomplicated - Primary (Chronic)  Currently well controlled with Advair 500/50 mcg one puff twice a day, montelukast 10 mg by mouth daily and albuterol inhaler as needed.  -Continue as is.  Asthma action plan was reviewed and provided.  Ordered baseline chest x-ray.  -Ordered total serum IgE.                    Other Relevant Orders    Spirometry, Breathing Capacity: Normal Order, Clinic Performed (Completed)    XR Chest 2 Views    IgE (Completed)    2. DNS (deviated nasal septum)  Discussed that some of his nasal congestion is due to anatomical obstruction, and if it is becoming bothersome and persistent, he may need to be evaluated by ENT for possible surgical correction.       Immune    3. IgM deficiency (H) (Chronic)  Historical diagnosis.  Selective IgM deficiency is a very rare disorder. There have been few hundred cases reported in the literature.  Usually, patients have recurrent infections from infancy associated with susceptibility to staph aureus, strep pneumonia, Haemophilus influenza and viral infections. Usually, these patients have normal levels of immunoglobulins, and they do not have any other identifiable  immunodeficiency. Mika, however, was also diagnosed with mild IgG2 subclass deficiency.  Different labs have a different lower range of normal concentration. [place] lower reference is 60. Other literature suggests 37.  IgG subclass deficiency does not necessarily correlate to a clinical disorder. It requires evidence of antibody dysfunction in the form of recurrent infections and inadequate response to vaccine challenge. However patient had only modestly decreased IgG2. On the other hand, sinus CT suggested mild to moderate sinus disease  -I ordered CBC with differential, CH 50, tetanus antibody levels, pneumococcal antibody levels, 23 serotypes, IgM, IgE, and IgA levels. Also placed an order for IgG subclasses. Depending on pneumococcal antibody levels, he may need postvaccination study.    Relevant Medications    beclomethasone (QVAR) 40 MCG/ACT Inhaler    Beclomethasone Dipropionate (QNASL) 80 MCG/ACT AERS Nasal Spray    azelastine (ASTELIN) 0.1 % spray    Other Relevant Orders    CH50 Classical Pathway Activity (Completed)    IgA (Completed)    IgM (Completed)    Strep pneumo IgG Abys 23 Serotypes (Completed)    T cell subset extended profile (Completed)    Tetanus antibody (Completed)    CBC with platelets differential      Other Visit Diagnoses     4. Other chronic rhinitis     The patient failed a variety of intranasal corticosteroids, Flonase, Nasonex, Rhinocort, and Nasacort. He did have clinical improvement with intranasal beclomethasone in the form of Qvar.  -I recommend the patient to start using QNASL 1-2 puffs in each nostril once a day.  -Start azelastine 2 sprays in each nostril twice a day as needed.  Anticipate SPT for aeroallergens in the future. At this point, he is not sure if he wants to restart allergen immunotherapy.     Relevant Medications    beclomethasone (QVAR) 40 MCG/ACT Inhaler    Beclomethasone Dipropionate (QNASL) 80 MCG/ACT AERS Nasal Spray    azelastine (ASTELIN) 0.1 % spray     5. Other chronic sinusitis   In the past, confirmed by sinus CT.  -Recommend a 21 day course of Augmentin 875/125 mg by mouth twice daily.  Depending on future symptom control, he may need to see ENT. We may also consider prophylactic antibiotics seasonally, since it was helpful in the past.       Relevant Medications        Beclomethasone Dipropionate (QNASL) 80 MCG/ACT AERS Nasal Spray    azelastine (ASTELIN) 0.1 % spray    amoxicillin-clavulanate (AUGMENTIN) 875-125 MG per tablet       I spent 80 minutes with this office visit, at least 45 minutes face-to-face counseling about treatment of asthma, chronic rhinitis, chronic sinusitis and evaluation and treatment of primary immune deficiencies.    Follow up in 3 months or sooner if needed.    Thank you for allowing us to participate in the care of this patient. Please feel free to contact us if there are any questions or concerns about the patient.    Disclaimer: This note consists of symbols derived from keyboarding, dictation and/or voice recognition software. As a result, there may be errors in the script that have gone undetected. Please consider this when interpreting information found in this chart.    Jose Angel Leiva MD, FACAAI  [facility]  [place], [place] and [place]

## 2017-11-03 NOTE — LETTER
11/3/2017        RE: Markell Ramsey  [address] [facility] [address]        SUBJECTIVE:                                                                 Markell Ramsey presents today to our Allergy Clinic at [facility]; he is being seen in consultation at the request of Dr. Lezama.  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    At the age of 8 years, he began to have perennial but seasonally-exacerbated (Spring and Fall) chronic nasal symptoms (itch, clear rhinorrhea, stuffiness, and sneezing) and ocular symptoms (itching and watering).  He doesn't think he is worse around dogs/cats.   Intranasal steroids (Flonase, QVAR, Nasonex, Rhinocort and Nasacort) have been used, and they were partially effective. He found QVAR more helpful, but it stopped being covered by is insurance. He tried all mentioned above nasal sprays over the years, for more than 30 days in a row. Allegra is partially helpful. He uses it as needed. The patient states he doesn't need it in Winter. He hasn't tried I/nasal antihistamines.  The patient's current treatment regime includes Flonase.The patient symptoms are currently 60% controlled.  Reviewed the results of previous skin test. SPT in 2007 was positive to molds, tree pollen, grass mix, and ragweed. IDs were positive for cat, roaches,  dog, mite. Aspergillus mold, and marsh elder. Started by Dr. Whittington on allergen IT in 2007,  [facility] Whittington used to practice before. He received allergen immunotherapy on and off until 2010, which he found partially helpful.     Sinus CT in 2013, with mild-moderate sinus disease, and DNS on the left. The patient confirms nasal congestion L>R.    IMPRESSION:  Mild/moderate mucosal thickening in the paranasal  sinuses as described above. Slight worsening of  "the right maxillary  sinus and worsening of the right frontal sinus since the previous  exam, but clearing of the left sphenoid sinus.      There is no history of PE tubes, sinus surgeries. He had tonsillectomy as a child.         In 2008 ch 50 was within normal limits. IgM was modestly low, 37 (), IgG 899, IgG 1 508 (382-929), IgG2 199 (241-700), IgG3 (), IgG4 (4-86).  Per Dr. Whittington, Pre and post-immunization strep pneumo titers showed > 4 fold increase to three serotypes.  7 serotypes were tested pre and post-vaccination.   At some point, he was on prophylactic antibiotics. Last several years, he did well without taking them.    However, in September, he developed discolored green, brown nasal mucus. Was treated with Z-justen, which was partially helpful. But several weeks ago, he developed \"kennedy brown\" PND again, sore throat, nasal congestion, and sinus pressure. He uses sinus rinses once-twice daily.       As a child, he began to have chronic chest symptoms  (cough, wheeze, SOB and chest tightness), triggered by URI. He doesn't think that allergies or exertion trigger his asthma.   Chest symptoms are acutely improved by albuterol use. The patient was on a lot of controllers, including theophylline. He has been on Advair 500/50 mcg 1 puff twice daily. He tried decreasing the dose several times, and it triggered asthma symptoms. These days, he doesn't use albuterol more than twice weekly. No night awakening due to chest symptoms for the last month. No h/o hospitalization for asthma. Had 2 episodes of pneumonia in his life that were CXR confirmed.  In February 2013, there is a chest x-ray with left perihilar infiltrate, right middle lobe and lingula.  He has been on oral steroids once or twice in the past year to treat an asthma exacerbation.  He has not needed urgent visits in  ER/UC for the last year. No missed work for the last year.  He uses a chamber device with an albuterol inhaler.      Patient " Active Problem List   Diagnosis     Seasonal allergic rhinitis due to pollen     Gastroesophageal reflux disease without esophagitis     Non morbid obesity due to excess calories     CARDIOVASCULAR SCREENING; LDL GOAL LESS THAN 160     IgM deficiency (H)     Moderate persistent asthma, uncomplicated     Essential hypertension     Hyperlipidemia LDL goal <130     DNS (deviated nasal septum)       Past Medical History:   Diagnosis Date     Acute bronchitis      Allergic rhinitis, cause unspecified 9/23/2008     Allergic rhinitis, unspecified allergic rhinitis type 9/23/2008     Problem list name updated by automated process. Provider to review     Esophageal reflux      Pneumonia 2/24/2013    MRSA, required hospitalization     Unspecified asthma(493.90)       Problem (# of Occurrences) Relation (Name,Age of Onset)    Allergies (1) Mother: asthma    CANCER (1) Father: lung    Respiratory (3) Sister: asthma, Son (Ludante): Upper Resp infections, Son (David): asthma        Past Surgical History:   Procedure Laterality Date     C APPENDECTOMY  08/2006     SURGICAL HISTORY OF -   2/23/06    L4-5 microdiscectomy     TONSILLECTOMY & ADENOIDECTOMY       Social History     Social History     Marital status:      Spouse name: N/A     Number of children: N/A     Years of education: N/A     Social History Main Topics     Smoking status: Never Smoker     Smokeless tobacco: Never Used     Alcohol use Yes      Comment: rare     Drug use: No     Sexual activity: Not Asked     Other Topics Concern     None     Social History Narrative    November 3, 2017        ENVIRONMENTAL HISTORY: [place] The home is heated with a forced air and gas furnace. They does have central air conditioning. The patient's bedroom is furnished with hard naseem in bedroom, allergen mattress cover and allergen pillowcase cover. No pets inside the house. There is no history of cockroach or mice infestation.  There are no smokers in the house.  The house does not have a damp basement.                Review of Systems   Constitutional: Positive for chills. Negative for activity change, fatigue and fever.   HENT: Positive for congestion and postnasal drip. Negative for ear pain, facial swelling, nosebleeds, rhinorrhea, sinus pressure and sneezing.    Eyes: Negative for discharge, redness and itching.   Respiratory: Negative for cough, chest tightness, shortness of breath, wheezing and stridor.    Gastrointestinal: Negative for diarrhea, nausea and vomiting.   Musculoskeletal: Negative for myalgias.   Skin: Negative for rash.   Allergic/Immunologic: Positive for environmental allergies.   Neurological: Negative for headaches.   Hematological: Negative for adenopathy.   Psychiatric/Behavioral: Negative for behavioral problems and self-injury.           Current Outpatient Prescriptions:      Beclomethasone Dipropionate (QNASL) 80 MCG/ACT AERS Nasal Spray, Spray 1-2 sprays into both nostrils daily, Disp: 8.7 g, Rfl: 3     azelastine (ASTELIN) 0.1 % spray, Spray 1 spray into both nostrils 2 times daily, Disp: 30 mL, Rfl: 3     amoxicillin-clavulanate (AUGMENTIN) 875-125 MG per tablet, Take 1 tablet by mouth 2 times daily for 21 days, Disp: 42 tablet, Rfl: 0     montelukast (SINGULAIR) 10 MG tablet, Take 1 tablet (10 mg) by mouth At Bedtime, Disp: 90 tablet, Rfl: 3     lisinopril-hydrochlorothiazide (PRINZIDE/ZESTORETIC) 10-12.5 MG per tablet, Take 1 tablet by mouth daily (Needs follow-up appointment for this medication), Disp: 90 tablet, Rfl: 3     atorvastatin (LIPITOR) 10 MG tablet, Take 1 tablet (10 mg) by mouth daily, Disp: 90 tablet, Rfl: 3     fluticasone-salmeterol (ADVAIR DISKUS) 500-50 MCG/DOSE diskus inhaler, Inhale 1 puff into the lungs 2 times daily, Disp: 3 Inhaler, Rfl: 3     fexofenadine (ALLEGRA) 180 MG tablet, Take 1 tablet (180 mg) by mouth daily, Disp: 30 tablet, Rfl: 11     omeprazole (PRILOSEC) 20 MG  "capsule, Take 1 capsule by mouth 2 times daily. (Patient taking differently: Take 20 mg by mouth daily ), Disp: 180 capsule, Rfl: 3     albuterol (VENTOLIN HFA) 108 (90 BASE) MCG/ACT Inhaler, Inhale 2 puffs into the lungs every 4 hours as needed Take for shortness of breath, cough or wheeze. Take before exercise. (Patient not taking: Reported on 11/3/2017), Disp: 1 Inhaler, Rfl: 11     fluticasone (FLONASE) 50 MCG/ACT spray, Spray 1-2 sprays into both nostrils daily (Patient not taking: Reported on 11/3/2017), Disp: 3 Bottle, Rfl: 11     albuterol (2.5 MG/3ML) 0.083% nebulizer solution, Take 1 vial (2.5 mg) by nebulization every 4 hours as needed for shortness of breath / dyspnea (Patient not taking: Reported on 9/21/2017), Disp: 30 vial, Rfl: 11     Respiratory Therapy Supplies (NEBULIZER/TUBING/MOUTHPIECE) KIT, use with nebulizer (Patient not taking: Reported on 11/3/2017), Disp: 1 kit, Rfl: 11  Immunization History   Administered Date(s) Administered     Influenza (H1N1) 01/11/2010     Influenza (IIV3) 10/29/2008, 08/26/2009, 10/05/2010     Influenza Vaccine IM 3yrs+ 4 Valent IIV4 10/23/2015, 09/21/2017     Pneumococcal 23 valent 01/29/2016     TDAP Vaccine (Adacel) 11/18/2008     No Known Allergies  OBJECTIVE:                                                                 /85 (BP Location: Left arm, Patient Position: Chair, Cuff Size: Adult Large)  Pulse 101  Temp 98.6  F (37  C) (Tympanic)  Ht 1.753 m (5' 9.02\")  Wt 102.3 kg (225 lb 8.5 oz)  SpO2 97%  BMI 33.29 kg/m2        Physical Exam   Constitutional: No distress.   obese   HENT:   Head: Normocephalic and atraumatic.   Right Ear: Tympanic membrane, external ear and ear canal normal.   Left Ear: Tympanic membrane, external ear and ear canal normal.   Nose: Mucosal edema (mild), rhinorrhea (mucoid bilaterally) and septal deviation (Septal spur on the left noted.) present.   Mouth/Throat: Oropharynx is clear and moist. No oropharyngeal exudate. "   Eyes: Conjunctivae are normal. Right eye exhibits no discharge. Left eye exhibits no discharge.   Neck: Normal range of motion.   Cardiovascular: Normal rate, regular rhythm and normal heart sounds.    No murmur heard.  Pulmonary/Chest: Effort normal and breath sounds normal. No respiratory distress. He has no wheezes. He has no rales.   Musculoskeletal: Normal range of motion.   Neurological: He is alert.   Skin: Skin is warm. He is not diaphoretic.   Psychiatric: Affect normal.   Nursing note and vitals reviewed.        WORKUP:     SPIROMETRY       FVC 4.28L (92% of predicted).     FEV1 3.44L (94% of predicted).     FEV1/FVC 80%     FEF 25%-75%  3.32L/s (104% of predicted)    The office spirometry performed today doesn't suggest an obstruction.        ASSESSMENT/PLAN:    Problem List Items Addressed This Visit        Respiratory    1. Moderate persistent asthma, uncomplicated - Primary (Chronic)  Currently well controlled with Advair 500/50 mcg one puff twice a day, montelukast 10 mg by mouth daily and albuterol inhaler as needed.  -Continue as is.  Asthma action plan was reviewed and provided.  Ordered baseline chest x-ray.  -Ordered total serum IgE.                    Other Relevant Orders    Spirometry, Breathing Capacity: Normal Order, Clinic Performed (Completed)    XR Chest 2 Views    IgE (Completed)    2. DNS (deviated nasal septum)  Discussed that some of his nasal congestion is due to anatomical obstruction, and if it is becoming bothersome and persistent, he may need to be evaluated by ENT for possible surgical correction.       Immune    3. IgM deficiency (H) (Chronic)  Historical diagnosis.  Selective IgM deficiency is a very rare disorder. There have been few hundred cases reported in the literature.  Usually, patients have recurrent infections from infancy associated with susceptibility to staph aureus, strep pneumonia, Haemophilus influenza and viral infections. Usually, these patients have normal  levels of immunoglobulins, and they do not have any other identifiable immunodeficiency. Mika, however, was also diagnosed with mild IgG2 subclass deficiency.  Different labs have a different lower range of normal concentration. University lower reference is 60. Other literature suggests 37.  IgG subclass deficiency does not necessarily correlate to a clinical disorder. It requires evidence of antibody dysfunction in the form of recurrent infections and inadequate response to vaccine challenge. However patient had only modestly decreased IgG2. On the other hand, sinus CT suggested mild to moderate sinus disease  -I ordered CBC with differential, CH 50, tetanus antibody levels, pneumococcal antibody levels, 23 serotypes, IgM, IgE, and IgA levels. Also placed an order for IgG subclasses. Depending on pneumococcal antibody levels, he may need postvaccination study.    Relevant Medications    beclomethasone (QVAR) 40 MCG/ACT Inhaler    Beclomethasone Dipropionate (QNASL) 80 MCG/ACT AERS Nasal Spray    azelastine (ASTELIN) 0.1 % spray    Other Relevant Orders    CH50 Classical Pathway Activity (Completed)    IgA (Completed)    IgM (Completed)    Strep pneumo IgG Abys 23 Serotypes (Completed)    T cell subset extended profile (Completed)    Tetanus antibody (Completed)    CBC with platelets differential      Other Visit Diagnoses     4. Other chronic rhinitis     The patient failed a variety of intranasal corticosteroids, Flonase, Nasonex, Rhinocort, and Nasacort. He did have clinical improvement with intranasal beclomethasone in the form of Qvar.  -I recommend the patient to start using QNASL 1-2 puffs in each nostril once a day.  -Start azelastine 2 sprays in each nostril twice a day as needed.  Anticipate SPT for aeroallergens in the future. At this point, he is not sure if he wants to restart allergen immunotherapy.     Relevant Medications    beclomethasone (QVAR) 40 MCG/ACT Inhaler    Beclomethasone Dipropionate  (QNASL) 80 MCG/ACT AERS Nasal Spray    azelastine (ASTELIN) 0.1 % spray    5. Other chronic sinusitis   In the past, confirmed by sinus CT.  -Recommend a 21 day course of Augmentin 875/125 mg by mouth twice daily.  Depending on future symptom control, he may need to see ENT. We may also consider prophylactic antibiotics seasonally, since it was helpful in the past.       Relevant Medications        Beclomethasone Dipropionate (QNASL) 80 MCG/ACT AERS Nasal Spray    azelastine (ASTELIN) 0.1 % spray    amoxicillin-clavulanate (AUGMENTIN) 875-125 MG per tablet       I spent 80 minutes with this office visit, at least 45 minutes face-to-face counseling about treatment of asthma, chronic rhinitis, chronic sinusitis and evaluation and treatment of primary immune deficiencies.    Follow up in 3 months or sooner if needed.    Thank you for allowing us to participate in the care of this patient. Please feel free to contact us if there are any questions or concerns about the patient.    Disclaimer: This note consists of symbols derived from keyboarding, dictation and/or voice recognition software. As a result, there may be errors in the script that have gone undetected. Please consider this when interpreting information found in this chart.    Jose Angel Leiva MD, [facility]  All[facility], Ast[facility] and Immunology  [place], [place] and [place]        Sincerely,        Jose Angel Leiva MD

## 2017-11-03 NOTE — NURSING NOTE
RN reviewed Asthma Action Plan with patient. Verbalized understanding.No further questions.    Delphine LU RN

## 2017-11-03 NOTE — Clinical Note
Dear Dr. Lezama, The patient was seen in Allergy Clinic for consultation.  Please see consult note for more details. Thank you for the referral!

## 2017-11-03 NOTE — MR AVS SNAPSHOT
After Visit Summary   11/3/2017    Markell Ramsey    MRN: [id number]           Patient Information     Date Of Birth          1960        Visit Information        Provider Department      11/3/2017 9:40 AM Jose Angel Leiva MD [facility]        Today's Diagnoses     Moderate persistent asthma, uncomplicated    -  1    Other chronic rhinitis        DNS (deviated nasal septum)        Other chronic sinusitis        IgM deficiency (H)          Care Instructions    Start QNASL 80 mcg 1-2 puffs once a day, in each nostril. If QNASL isn't covered, would prescribe you flunisolide.   -Use azelastine 2 sprays in each nostril twice a day when necessary.      Asthma management per asthma action plan.    Get the bloodwork done.  Get chest x-ray done.    Augmentin for 21 days.            Follow-ups after your visit        Future tests that were ordered for you today     Open Future Orders        Priority Expected Expires Ordered    CBC with platelets differential Routine  11/3/2018 11/3/2017    XR Chest 2 Views Routine 11/3/2017 11/3/2018 11/3/2017            Who to contact     If you have questions or need follow up information about today's clinic visit or your schedule please contact [facility] directly at [phone].  Normal or non-critical lab and imaging results will be communicated to you by TapnScraphart, letter or phone within 4 business days after the clinic has received the results. If you do not hear from us within 7 days, please contact the clinic through TapnScraphart or phone. If you have a critical or abnormal lab result, we will notify you by phone as soon as possible.  Submit refill requests through iKaaz or call your pharmacy and they will forward the refill request to us. Please allow 3 business days for your refill to be completed.          Additional Information About Your Visit        TapnScrapharOutbrain Information     iKaaz gives you secure access to your electronic health record. If  "you see a primary care provider, you can also send messages to your care team and make appointments. If you have questions, please call your primary care clinic.  If you do not have a primary care provider, please call [phone] and they will assist you.        Care EveryWhere ID     This is your Care EveryWhere ID. This could be used by other organizations to access your [facility] medical records  [id number]        Your Vitals Were     Pulse Temperature Height Pulse Oximetry BMI (Body Mass Index)       101 98.6  F (37  C) (Tympanic) 1.753 m (5' 9.02\") 97% 33.29 kg/m2        Blood Pressure from Last 3 Encounters:   11/03/17 142/85   09/21/17 112/89   04/25/17 134/77    Weight from Last 3 Encounters:   11/03/17 102.3 kg (225 lb 8.5 oz)   09/21/17 98.4 kg (217 lb)   04/25/17 98.9 kg (218 lb)              We Performed the Following     CH50 Classical Pathway Activity     IgA     IgE     IgG subclasses     IgM     Spirometry, Breathing Capacity: Normal Order, Clinic Performed     Strep pneumo IgG Abys 23 Serotypes     T cell subset extended profile     Tetanus antibody          Today's Medication Changes          These changes are accurate as of: 11/3/17 11:12 AM.  If you have any questions, ask your nurse or doctor.               Start taking these medicines.        Dose/Directions    amoxicillin-clavulanate 875-125 MG per tablet   Commonly known as:  AUGMENTIN   Used for:  Other chronic sinusitis   Started by:  Jose Angel Leiva MD        Dose:  1 tablet   Take 1 tablet by mouth 2 times daily for 21 days   Quantity:  42 tablet   Refills:  0       azelastine 0.1 % spray   Commonly known as:  ASTELIN   Used for:  Other chronic rhinitis   Started by:  Jose Angel Leiva MD        Dose:  1 spray   Spray 1 spray into both nostrils 2 times daily   Quantity:  30 mL   Refills:  3       Beclomethasone Dipropionate 80 MCG/ACT Aers Nasal Spray   Commonly known as:  QNASL   Used for:  Other chronic rhinitis   Started by:  Cali" MD Jose Angel        Dose:  1-2 spray   Spray 1-2 sprays into both nostrils daily   Quantity:  8.7 g   Refills:  3         These medicines have changed or have updated prescriptions.        Dose/Directions    omeprazole 20 MG CR capsule   Commonly known as:  priLOSEC   This may have changed:  when to take this   Used for:  GERD (gastroesophageal reflux disease)        Dose:  20 mg   Take 1 capsule by mouth 2 times daily.   Quantity:  180 capsule   Refills:  3            Where to get your medicines      These medications were sent to [facility] [id number] [place] [address] AT [facility] OF [address] [place] [address]     Phone:  [phone]     amoxicillin-clavulanate 875-125 MG per tablet    azelastine 0.1 % spray    Beclomethasone Dipropionate 80 MCG/ACT Aers Nasal [place]                Primary Care Provider Office Phone # Fax #    Mary Lezama,  [phone] [address] [facility] [address]        Equal Access to Services     INGA FERNANDEZ : Hadii dian portillo hadasho Soomaali, waaxda luqadaha, qaybta kaalmada adeegyacasandra, waxdevin ervin . So [facility] [phone].    ATENCIÓN: Si habla español, tiene a gates disposición servicios gratuitos de asistencia lingüística. Llame al [phone].    We comply with applicable federal civil rights laws and [place] laws. We do not discriminate on the basis of race, color, national origin, age, disability, sex, sexual orientation, or gender identity.            Thank you!     Thank you for choosing [facility]  for your care. Our goal is always to provide you with excellent care. Hearing back from our patients is one way we can continue to improve our services. Please take a few minutes to complete the written survey that you may receive in the mail after your visit with us. Thank you!             Your Updated Medication List - Protect others around you: Learn how to  safely use, store and throw away your medicines at www.disposemymeds.org.          This list is accurate as of: 11/3/17 11:12 AM.  Always use your most recent med list.                   Brand Name Dispense Instructions for use Diagnosis    * albuterol (2.5 MG/3ML) 0.083% neb solution     30 vial    Take 1 vial (2.5 mg) by nebulization every 4 hours as needed for shortness of breath / dyspnea    Moderate persistent asthma, uncomplicated       * albuterol 108 (90 BASE) MCG/ACT Inhaler    VENTOLIN HFA    1 Inhaler    Inhale 2 puffs into the lungs every 4 hours as needed Take for shortness of breath, cough or wheeze. Take before exercise.    Moderate persistent asthma, uncomplicated       amoxicillin-clavulanate 875-125 MG per tablet    AUGMENTIN    42 tablet    Take 1 tablet by mouth 2 times daily for 21 days    Other chronic sinusitis       atorvastatin 10 MG tablet    LIPITOR    90 tablet    Take 1 tablet (10 mg) by mouth daily    Hyperlipidemia LDL goal <130       azelastine 0.1 % spray    ASTELIN    30 mL    Spray 1 spray into both nostrils 2 times daily    Other chronic rhinitis       beclomethasone 40 MCG/ACT Inhaler    QVAR     Inhale 1 puff into the lungs 2 times daily        Beclomethasone Dipropionate 80 MCG/ACT Aers Nasal Spray    QNASL    8.7 g    Spray 1-2 sprays into both nostrils daily    Other chronic rhinitis       fexofenadine 180 MG tablet    ALLEGRA    30 tablet    Take 1 tablet (180 mg) by mouth daily    Moderate persistent asthma, uncomplicated       fluticasone 50 MCG/ACT spray    FLONASE    3 Bottle    Spray 1-2 sprays into both nostrils daily    Seasonal allergic rhinitis due to pollen       fluticasone-salmeterol 500-50 MCG/DOSE diskus inhaler    ADVAIR DISKUS    3 Inhaler    Inhale 1 puff into the lungs 2 times daily    Moderate persistent asthma, uncomplicated       lisinopril-hydrochlorothiazide 10-12.5 MG per tablet    PRINZIDE/ZESTORETIC    90 tablet    Take 1 tablet by mouth daily  (Needs follow-up appointment for this medication)    Essential hypertension       montelukast 10 MG tablet    SINGULAIR    90 tablet    Take 1 tablet (10 mg) by mouth At Bedtime    Moderate persistent asthma, uncomplicated       nebulizer/tubing/mouthpiece Kit     1 kit    use with nebulizer    Moderate persistent asthma, uncomplicated       omeprazole 20 MG CR capsule    priLOSEC    180 capsule    Take 1 capsule by mouth 2 times daily.    GERD (gastroesophageal reflux disease)       * Notice:  This list has 2 medication(s) that are the same as other medications prescribed for you. Read the directions carefully, and ask your doctor or other care provider to review them with you.

## 2017-11-03 NOTE — LETTER
December 20, 2017      Markell Ramsey  [address] [address]        Dear ,    We are writing to inform you of your test results.    Your test results fall within the expected range(s) or remain unchanged from previous results.  Please continue with current treatment plan.    Overall, besides decreased IgM, that you have been having for years, the rest of the labs are normal.   -No changes in the plan.  Depending on future symptom control he may consider prophylactic antibiotics seasonally since it was helpful in the past.     We will repeat the labs every 1-2 years.     Resulted Orders   CH50 Classical Pathway Activity   Result Value Ref Range    CH50 Classical Path Activity Canceled, Test credited         Comment:      Duplicate request   IgA   Result Value Ref Range     70 - 380 mg/dL   IgM   Result Value Ref Range    IGM 32 (L) 60 - 265 mg/dL   Strep pneumo IgG Abys 23 Serotypes   Result Value Ref Range    Serotype 1(1) 8.6 >=2.3 mcg/mL    Serotype 2(2) 4.2 >=1.0 mcg/mL    Serotype 3(3) 10.4 >=1.8 mcg/mL    Serotype 4(4) 1.4 >=0.6 mcg/mL    Serotype 5(5) 9.6 >=10.7 mcg/mL    Serotype 8(8) 11.4 >=2.9 mcg/mL    Serotype 9N(9) 3.2 >=9.2 mcg/mL    Serotype 12F(12) 0.5 >=0.6 mcg/mL    Serotype 14(14) 8.1 >=7.0 mcg/mL    Serotype 17F(17) 8.1 >=7.8 mcg/mL    Serotype 19F(19) 7.3 >=15.0 mcg/mL    Serotype 20(20) 2.0 >=1.3 mcg/mL    Serotype 22F(22) 15.4 >=7.2 mcg/mL    Serotype 23F(23) 20.0 >=8.0 mcg/mL    Serotype 6B(26) 3.2 >=4.7 mcg/mL    Serotype 10A(34) 4.3 >=2.9 mcg/mL    Serotype 11A(43) 1.1 >=2.4 mcg/mL    Serotype 7F(51) 6.4 >=3.2 mcg/mL    Serotype 15B(54) 4.7 >=3.3 mcg/mL    Serotype 18C(56) 3.5 >=3.3 mcg/mL    Serotype 19A(57) 7.0 >=17.1 mcg/mL    Serotype 9V(68) Test Not Performed       Comment:      (Note)  Unable to perform testing on serotype 68 (9V) due to   reagent issue.      Serotype 33F(70) 11.0 >=1.7 mcg/mL      Comment:      (Note)  Overall interpretation of  pneumococcal antibody serology   panel can be based upon the 22 reported serotypes. Either   of the two following conditions would be consistent with a   normal response to Streptococcus pneumoniae vaccination:   Antibody concentrations greater than or equal to the   reference value for at least 50% of serotypes in either a   pre- or post-vaccination sample. Antibody concentrations   increased by 2-fold or greater for at least 50% of   serotypes when comparing the pre- to the post-vaccination   results. Optimal cut-offs (reference values) were derived   by measuring serotype-specific IgG antibody levels in an   adult cohort of 100 healthy individuals (previously   unvaccinated) before and after pneumococcal vaccination and   identifying the antibody level for each serotype that   included the largest number of individuals with a negative   response (below cut-off) pre-vaccination and a positive   response (above cut-off) post-vaccination.  All 23 serot  ypes assessed by this assay are included in the   Pneumovax 23 vaccine. IgG antibody concentrations following   Pneumovax 23 administration are a reflection of an   individual's humoral immune response to polysaccharide   antigens. Serotypes 1, 3, 4, 5, 6A (6), 14, 19F (19), 23F   (23), 6B (26), 7F (51), 18C (56), 19A (57) and 9V (68) are   included in the Prevnar-13 conjugate vaccine. Antibody   concentrations following Prevnar-13 administration are a   reflection of an individual's response to   protein-conjugated antigens. Serotypes 2, 8, 9N (9), 12F   (12), 17F (17), 20, 22F (22), 10A (34), 11A (43), 15B (54)   and 33F (70) are present only in the Pneumovax 23 vaccine   and not in Prevnar-13. Responses to these 11 serotypes are   a reflection of an individual's response to polysaccharide   antigens. Serotype 6A is only present in Prevnar-13.  This test was developed and its performance characteristics   determined by [facility] in a manner consistent with CLIA    requirements. This   test has not been cleared or approved by   the U.S. Food and Drug Administration.  Test Performed by:  [facility]  [address] [place] [address]     T cell subset extended profile   Result Value Ref Range    IFC Specimen Blood     CD3 Mature T 77 49 - 84 %    CD4 Durham T 59 28 - 63 %    CD8 Suppressor T 18 10 - 40 %    CD19 B Cells 11 6 - 27 %    CD16 + 56 Natural Killer Cells 10 4 - 25 %    CD4:CD8 Ratio 3.28 (H) 1.40 - 2.60    Absolute CD3 1456 603 - 2990 cells/uL    Absolute CD4 1107 441 - 2156 cells/uL    Absolute CD8 338 125 - 1312 cells/uL    Absolute CD19 208 107 - 698 cells/uL    Absolute CD16+56 194 95 - 640 cells/uL   Tetanus antibody   Result Value Ref Range    Tetanus Antibody 1.00 IU/mL      Comment:      A concentration >0.01 IU/mL is considered to be protective but a concentration   of >0.15 IU/mL is desirable.  Analyte Specific Reagents (ASRs) are used in many laboratory tests necessary   for standard medical care and generally do not require FDA approval. This test   was validated by [facility]. It has not been cleared or approved by the US Food and Drug   Administration.     IgG subclasses   Result Value Ref Range     695 - 1620 mg/dL    IgG1 520 300 - 856 mg/dL      Comment:      Results confirmed by repeat test    IgG2 210 158 - 761 mg/dL      Comment:      Results confirmed by repeat test    IgG3 34 24 - 192 mg/dL      Comment:      Results confirmed by repeat test    IgG4 20 11 - 86 mg/dL      Comment:      Results confirmed by repeat test   IgE   Result Value Ref Range    IGE 31 0 - 114 KIU/L   [facility] Miscellaneous Test   Result Value Ref Range    Result SEE NOTE       Comment:      (Note)  Test name                    Result Flag  Units  RefIntvl  ------------------------------------------------------------  [facility]                             See Note                         CLASSICAL PATHWAY ACTVTY      289       [[id number]]  Units/mL                                (NOTE)  This assay is used for clinical purposes and was developed,   and its performance characteristics determined by [facility]. It has   not been cleared or approved by the US Food and Drug   Administration. The FDA has determined that such clearance   or approval is not necessary. This laboratory is certified   under the Clinical Laboratory Improvement Amendments of   1988 (CLIA-88) as qualified to perform high complexity   clinical laboratory testing.   Legend:  H=High    L=Low    @=Critical  Performed at: [facility],   [address] [place] [address]      Test Name CH50 Classical Pathway Activity     Send Outs Misc Test Code [id number] ([facility] via [place]     Send Outs Misc Test Specimen Serum        If you have any questions or concerns, please call the clinic at the number listed above.       Sincerely,        Jose Angel Leiva MD

## 2017-11-04 LAB
IGA SERPL-MCNC: 114 MG/DL (ref 70–380)
IGM SERPL-MCNC: 32 MG/DL (ref 60–265)

## 2017-11-06 ENCOUNTER — TELEPHONE (OUTPATIENT)
Dept: ALLERGY | Facility: CLINIC | Age: 57
End: 2017-11-06

## 2017-11-06 DIAGNOSIS — J30.1 CHRONIC SEASONAL ALLERGIC RHINITIS DUE TO POLLEN: Primary | ICD-10-CM

## 2017-11-06 LAB — IGE SERPL-ACNC: 31 KIU/L (ref 0–114)

## 2017-11-07 LAB
IGG SERPL-MCNC: 888 MG/DL (ref 695–1620)
IGG1 SER-MCNC: 520 MG/DL (ref 300–856)
IGG2 SER-MCNC: 210 MG/DL (ref 158–761)
IGG3 SER-MCNC: 34 MG/DL (ref 24–192)
IGG4 SER-MCNC: 20 MG/DL (ref 11–86)

## 2017-11-08 LAB — C TETANI IGG SER IA-ACNC: 1 IU/ML

## 2017-11-09 LAB
DEPRECATED S PNEUM 1 IGG SER-MCNC: 8.6 MCG/ML
DEPRECATED S PNEUM12 IGG SER-MCNC: 0.5 MCG/ML
DEPRECATED S PNEUM14 IGG SER-MCNC: 8.1 MCG/ML
DEPRECATED S PNEUM17 IGG SER-MCNC: 8.1 MCG/ML
DEPRECATED S PNEUM19 IGG SER-MCNC: 7.3 MCG/ML
DEPRECATED S PNEUM2 IGG SER-MCNC: 4.2 MCG/ML
DEPRECATED S PNEUM20 IGG SER-MCNC: 2 MCG/ML
DEPRECATED S PNEUM22 IGG SER-MCNC: 15.4 MCG/ML
DEPRECATED S PNEUM23 IGG SER-MCNC: 20 MCG/ML
DEPRECATED S PNEUM3 IGG SER-MCNC: 10.4 MCG/ML
DEPRECATED S PNEUM34 IGG SER-MCNC: 4.3 MCG/ML
DEPRECATED S PNEUM4 IGG SER-MCNC: 1.4 MCG/ML
DEPRECATED S PNEUM43 IGG SER-MCNC: 1.1 MCG/ML
DEPRECATED S PNEUM5 IGG SER-MCNC: 9.6 MCG/ML
DEPRECATED S PNEUM8 IGG SER-MCNC: 11.4 MCG/ML
DEPRECATED S PNEUM9 IGG SER-MCNC: 3.2 MCG/ML
S PNEUM DA 15B IGG SER-MCNC: 4.7 MCG/ML
S PNEUM DA 18C IGG SER-MCNC: 3.5 MCG/ML
S PNEUM DA 19A IGG SER-MCNC: 7 MCG/ML
S PNEUM DA 33F IGG SER-MCNC: 11 MCG/ML
S PNEUM DA 6B IGG SER-MCNC: 3.2 MCG/ML
S PNEUM DA 7F IGG SER-MCNC: 6.4 MCG/ML
S PNEUM DA 9V IGG SER-MCNC: NORMAL UG/ML

## 2017-11-09 RX ORDER — FLUNISOLIDE 0.25 MG/ML
2 SOLUTION NASAL 2 TIMES DAILY
Qty: 1 BOTTLE | Refills: 3 | Status: SHIPPED | OUTPATIENT
Start: 2017-11-09 | End: 2018-06-11

## 2017-11-09 NOTE — TELEPHONE ENCOUNTER
PA denied needs to try and fail all alternative medications.   - Flunisolide nasal spray  - Fluticasone nasal spray (tried)  -Nasacort nasal spray (tried)  -Nasonex nasal spray (tried)  -Triamcinolone nasal spray (tried)    Karoline Horn Hill  Specialty CSS

## 2017-11-09 NOTE — TELEPHONE ENCOUNTER
Called and spoke with pt regarding note below.   Will call and update. States the azelastine is working well.   No further questions or concerns.  Delphine LU RN

## 2017-11-22 ENCOUNTER — TELEPHONE (OUTPATIENT)
Dept: FAMILY MEDICINE | Facility: CLINIC | Age: 57
End: 2017-11-22

## 2017-11-22 LAB
RESULT: NORMAL
SEND OUTS MISC TEST CODE: NORMAL
SEND OUTS MISC TEST SPECIMEN: NORMAL
TEST NAME: NORMAL

## 2017-12-13 LAB — CH50 SERPL-ACNC: NORMAL

## 2017-12-14 NOTE — PROGRESS NOTES
Normal CH 50.    Normal total IgE.  Normal total IgG.  Normal IgG subclasses.  Normal total IgA level. Decreased IgM level. Normal T and B cells ( flow cytometry).  20/22 protective pneumococcal antibody levels. Protective tetanus antibody level.  Besides decreased IgM, that he has been having for years, the rest of the labs are normal.  -No changes in the plan.  Depending on future symptom control he may consider prophylactic antibiotics seasonally since it was helpful in the past.  We will repeat the labs every 1-2 years.

## 2018-01-08 ENCOUNTER — OFFICE VISIT (OUTPATIENT)
Dept: ALLERGY | Facility: CLINIC | Age: 58
End: 2018-01-08
Payer: COMMERCIAL

## 2018-01-08 VITALS
RESPIRATION RATE: 18 BRPM | HEART RATE: 102 BPM | SYSTOLIC BLOOD PRESSURE: 139 MMHG | WEIGHT: 222.88 LBS | TEMPERATURE: 97.3 F | BODY MASS INDEX: 32.9 KG/M2 | OXYGEN SATURATION: 96 % | DIASTOLIC BLOOD PRESSURE: 78 MMHG

## 2018-01-08 DIAGNOSIS — J45.40 NOT WELL CONTROLLED MODERATE PERSISTENT ASTHMA: Primary | ICD-10-CM

## 2018-01-08 DIAGNOSIS — J20.8 ACUTE BRONCHITIS DUE TO OTHER SPECIFIED ORGANISMS: ICD-10-CM

## 2018-01-08 DIAGNOSIS — J31.0 OTHER CHRONIC RHINITIS: ICD-10-CM

## 2018-01-08 LAB
FEF 25/75: NORMAL
FEV-1: NORMAL
FEV1/FVC: NORMAL
FVC: NORMAL

## 2018-01-08 PROCEDURE — 94010 BREATHING CAPACITY TEST: CPT | Performed by: ALLERGY & IMMUNOLOGY

## 2018-01-08 PROCEDURE — 99214 OFFICE O/P EST MOD 30 MIN: CPT | Mod: 25 | Performed by: ALLERGY & IMMUNOLOGY

## 2018-01-08 RX ORDER — AZITHROMYCIN 250 MG/1
TABLET, FILM COATED ORAL
Qty: 6 TABLET | Refills: 0 | Status: SHIPPED | OUTPATIENT
Start: 2018-01-08 | End: 2018-06-12

## 2018-01-08 RX ORDER — AZITHROMYCIN 250 MG/1
TABLET, FILM COATED ORAL
Qty: 30 TABLET | Refills: 1 | Status: SHIPPED | OUTPATIENT
Start: 2018-01-13 | End: 2018-06-12

## 2018-01-08 RX ORDER — PREDNISONE 20 MG/1
40 TABLET ORAL DAILY
Qty: 10 TABLET | Refills: 0 | Status: SHIPPED | OUTPATIENT
Start: 2018-01-08 | End: 2018-01-13

## 2018-01-08 RX ORDER — IPRATROPIUM BROMIDE AND ALBUTEROL SULFATE 2.5; .5 MG/3ML; MG/3ML
1 SOLUTION RESPIRATORY (INHALATION) EVERY 6 HOURS PRN
Qty: 30 VIAL | Refills: 1 | Status: SHIPPED | OUTPATIENT
Start: 2018-01-08

## 2018-01-08 ASSESSMENT — ENCOUNTER SYMPTOMS
NAUSEA: 0
COUGH: 1
SINUS PRESSURE: 0
CHEST TIGHTNESS: 1
MYALGIAS: 0
FEVER: 0
SHORTNESS OF BREATH: 1
WHEEZING: 1
ADENOPATHY: 0
EYE ITCHING: 0
ACTIVITY CHANGE: 0
EYE DISCHARGE: 0
VOMITING: 0
FACIAL SWELLING: 0
HEADACHES: 1
EYE REDNESS: 0
ARTHRALGIAS: 0
RHINORRHEA: 0
DIARRHEA: 0
JOINT SWELLING: 0
FATIGUE: 1

## 2018-01-08 NOTE — NURSING NOTE
"Chief Complaint   Patient presents with     Asthma Recheck       Initial /78 (BP Location: Left arm, Patient Position: Sitting, Cuff Size: Adult Large)  Pulse 102  Temp 97.3  F (36.3  C) (Oral)  Resp 18  Wt 101.1 kg (222 lb 14.2 oz)  SpO2 96%  BMI 32.9 kg/m2 Estimated body mass index is 32.9 kg/(m^2) as calculated from the following:    Height as of 11/3/17: 1.753 m (5' 9.02\").    Weight as of this encounter: 101.1 kg (222 lb 14.2 oz).  Medication Reconciliation: complete    "

## 2018-01-08 NOTE — LETTER
1/8/2018         RE: Markell Ramsey  [address] [facility] [address]        Dear Colleague,    Thank you for referring your patient, Markell Ramsey, to the [facility]. Please see a copy of my visit note below.    SUBJECTIVE:                                                                   Markell Rmasey presents today to our Allergy Clinic at [facility]  for a follow up visit.    As you know, he is a 57-year-old male with moderate persistent asthma, allergic rhinitis, recurrent sinus infection/bronchitis and decreased IgM.    November 2017:Normal CH 50. Normal total IgE.  Normal total IgG.  Normal IgG subclasses.   Normal total IgA level. Decreased IgM level. Normal T and B cells ( flow cytometry).  20/22 protective pneumococcal antibody levels. Protective tetanus antibody level.        Sinus CT in 2013, with mild-moderate sinus disease, and DNS on the left. The patient confirms nasal congestion L>R.     IMPRESSION:  Mild/moderate mucosal thickening in the paranasal  sinuses as described above.  Slight worsening of the right maxillary  sinus and worsening of the right frontal sinus since the previous  exam, but clearing of the left sphenoid sinus.      In mide December, his children were sick with respiratory infections. He developed productive green-brownish cough.  He continues having symptoms. Complains of chest tightness, productive cough, wheezing.   He uses albuterol several times times a day but not every day. He uses Advair 500/50 mcg 1 puff twice daily, QVAR 80 mcg 2 puffs 3 times daily, and taking montelukast daily.     Regarding his rhinitis, he has been dong pretty well using flunisolide and azelastine. States that it has been a while since he felt so good from the standpoint of nasal symptoms. The patient denies clear rhinorrhea, nasal itch, stuffiness or interval sinusitis symptoms of fever, facial pain or purulent rhinorrhea.          Patient Active Problem List    Diagnosis     Gastroesophageal reflux disease without esophagitis     Non morbid obesity due to excess calories     CARDIOVASCULAR SCREENING; LDL GOAL LESS THAN 160     IgM deficiency (H)     Moderate persistent asthma, uncomplicated     Essential hypertension     Hyperlipidemia LDL goal <130     DNS (deviated nasal septum)     Other chronic rhinitis       Past Medical History:   Diagnosis Date     Acute bronchitis      Allergic rhinitis, cause unspecified 9/23/2008     Allergic rhinitis, unspecified allergic rhinitis type 9/23/2008     Problem list name updated by automated process. Provider to review     Esophageal reflux      Pneumonia 2/24/2013    MRSA, required hospitalization     Unspecified asthma(493.90)       Problem (# of Occurrences) Relation (Name,Age of Onset)    Allergies (1) Mother: asthma    CANCER (1) Father: lung    Respiratory (3) Sister: asthma, Son (Luke): Upper Resp infections, Son (David): asthma        Past Surgical History:   Procedure Laterality Date     C APPENDECTOMY  08/2006     SURGICAL HISTORY OF -   2/23/06    L4-5 microdiscectomy     TONSILLECTOMY & ADENOIDECTOMY       Social History     Social History     Marital status:      Spouse name: N/A     Number of children: N/A     Years of education: N/A     Social History Main Topics     Smoking status: Never Smoker     Smokeless tobacco: Never Used     Alcohol use Yes      Comment: rare     Drug use: No     Sexual activity: Not Asked     Other Topics Concern     None     Social History Narrative    November 3, 2017        ENVIRONMENTAL HISTORY: [place] The home is heated with a forced air and gas furnace. They does have central air conditioning. The patient's bedroom is furnished with hard naseem in bedroom, allergen mattress cover and allergen pillowcase cover. No pets inside the house. There is no history of cockroach or mice infestation. There are no smokers in the house.  The  house does not have a damp basement.                Review of Systems   Constitutional: Positive for fatigue. Negative for activity change and fever.   HENT: Positive for postnasal drip and sneezing. Negative for congestion, dental problem, ear pain, facial swelling, nosebleeds, rhinorrhea and sinus pressure.    Eyes: Negative for discharge, redness and itching.   Respiratory: Positive for cough, chest tightness, shortness of breath and wheezing.    Cardiovascular: Negative for chest pain.   Gastrointestinal: Negative for diarrhea, nausea and vomiting.   Musculoskeletal: Negative for arthralgias, joint swelling and myalgias.   Skin: Negative for rash.   Neurological: Positive for headaches.   Hematological: Negative for adenopathy.   Psychiatric/Behavioral: Negative for behavioral problems and self-injury.           Current Outpatient Prescriptions:      ipratropium - albuterol 0.5 mg/2.5 mg/3 mL (DUONEB) 0.5-2.5 (3) MG/3ML neb solution, Take 1 vial (3 mLs) by nebulization every 6 hours as needed for shortness of breath / dyspnea or wheezing, Disp: 30 vial, Rfl: 1     fluticasone (FLOVENT DISKUS) 100 MCG/BLIST AEPB, Inhale 1 puff into the lungs every 12 hours, Disp: 60 Inhaler, Rfl: 3     azithromycin (ZITHROMAX) 250 MG tablet, Two tablets first day, then one tablet daily for four days., Disp: 6 tablet, Rfl: 0     [START ON 1/13/2018] azithromycin (ZITHROMAX) 250 MG tablet, Take one tablet by mouth once daily on Mondays, Wednesdays and Fridays., Disp: 30 tablet, Rfl: 1     predniSONE (DELTASONE) 20 MG tablet, Take 2 tablets (40 mg) by mouth daily for 5 days, Disp: 10 tablet, Rfl: 0     flunisolide (NASALIDE) 25 MCG/ACT (0.025%) SOLN spray, Spray 2 sprays into both nostrils 2 times daily, Disp: 1 Bottle, Rfl: 3     azelastine (ASTELIN) 0.1 % spray, Spray 1 spray into both nostrils 2 times daily, Disp: 30 mL, Rfl: 3     montelukast (SINGULAIR) 10 MG tablet, Take 1 tablet (10 mg) by mouth At Bedtime, Disp: 90 tablet,  Rfl: 3     albuterol (VENTOLIN HFA) 108 (90 BASE) MCG/ACT Inhaler, Inhale 2 puffs into the lungs every 4 hours as needed Take for shortness of breath, cough or wheeze. Take before exercise., Disp: 1 Inhaler, Rfl: 11     lisinopril-hydrochlorothiazide (PRINZIDE/ZESTORETIC) 10-12.5 MG per tablet, Take 1 tablet by mouth daily (Needs follow-up appointment for this medication), Disp: 90 tablet, Rfl: 3     atorvastatin (LIPITOR) 10 MG tablet, Take 1 tablet (10 mg) by mouth daily, Disp: 90 tablet, Rfl: 3     fluticasone-salmeterol (ADVAIR DISKUS) 500-50 MCG/DOSE diskus inhaler, Inhale 1 puff into the lungs 2 times daily, Disp: 3 Inhaler, Rfl: 3     fexofenadine (ALLEGRA) 180 MG tablet, Take 1 tablet (180 mg) by mouth daily, Disp: 30 tablet, Rfl: 11     omeprazole (PRILOSEC) 20 MG capsule, Take 1 capsule by mouth 2 times daily. (Patient taking differently: Take 20 mg by mouth daily ), Disp: 180 capsule, Rfl: 3     Beclomethasone Dipropionate (QNASL) 80 MCG/ACT AERS Nasal Spray, Spray 1-2 sprays into both nostrils daily (Patient not taking: Reported on 1/8/2018), Disp: 8.7 g, Rfl: 3     fluticasone (FLONASE) 50 MCG/ACT spray, Spray 1-2 sprays into both nostrils daily (Patient not taking: Reported on 11/3/2017), Disp: 3 Bottle, Rfl: 11     albuterol (2.5 MG/3ML) 0.083% nebulizer solution, Take 1 vial (2.5 mg) by nebulization every 4 hours as needed for shortness of breath / dyspnea (Patient not taking: Reported on 9/21/2017), Disp: 30 vial, Rfl: 11     Respiratory Therapy Supplies (NEBULIZER/TUBING/MOUTHPIECE) KIT, use with nebulizer (Patient not taking: Reported on 11/3/2017), Disp: 1 kit, Rfl: 11  Immunization History   Administered Date(s) Administered     Influenza (H1N1) 01/11/2010     Influenza (IIV3) PF 10/29/2008, 08/26/2009, 10/05/2010     Influenza Vaccine IM 3yrs+ 4 Valent IIV4 10/23/2015, 09/21/2017     Pneumococcal 23 valent 01/29/2016     TDAP Vaccine (Adacel) 11/18/2008     No Known Allergies  OBJECTIVE:                                                                  /78 (BP Location: Left arm, Patient Position: Sitting, Cuff Size: Adult Large)  Pulse 102  Temp 97.3  F (36.3  C) (Oral)  Resp 18  Wt 101.1 kg (222 lb 14.2 oz)  SpO2 96%  BMI 32.9 kg/m2        Physical Exam   Constitutional: No distress.   obese   HENT:   Head: Normocephalic and atraumatic.   Right Ear: Tympanic membrane, external ear and ear canal normal.   Left Ear: Tympanic membrane, external ear and ear canal normal.   Nose: Septal deviation (Septal spur on the left noted.) present. No mucosal edema or rhinorrhea.   Mouth/Throat: Oropharynx is clear and moist. No oropharyngeal exudate.   Mildly dry nasal mucosa   Eyes: Conjunctivae are normal. Right eye exhibits no discharge. Left eye exhibits no discharge.   Neck: Normal range of motion.   Cardiovascular: Normal rate, regular rhythm and normal heart sounds.    No murmur heard.  Pulmonary/Chest: Effort normal. No respiratory distress. He has no wheezes. He has rales.   rhonchi that clear with coughing     Musculoskeletal: Normal range of motion.   Neurological: He is alert.   Skin: Skin is warm. He is not diaphoretic.   Psychiatric: Affect normal.   Nursing note and vitals reviewed.            WORKUP:   SPIROMETRY       FVC 4.26L (91% of predicted).     FEV1 3.39L (92% of predicted).     FEV1/FVC 79%     FEF 25%-75%  2.91L/s (91% of predicted).      The office spirometry performed today doesn't suggest an obstruction.    Asthma Control Test (ACT) total score: 13  ASSESSMENT/PLAN:    Problem List Items Addressed This Visit        Respiratory    1. Other chronic rhinitis  Currently well controlled with intranasal flunisolide and azelastine.  -Continue as is. He may use use some nasal saline rinse to moisturize nasal mucosa. Decreasing on azelastine should help as well.                  Other Visit Diagnoses     2. Not well controlled moderate persistent asthma    -  Primary    Currently not well  controlled due to bronchitis exacerbation.  -Start prednisone 40 mg by mouth for 5 days.  Once daily  -Continue Advair 500/50 mcg 1 puff twice daily.  -In Yellow zone, use Flovent 100 mcg 1 puff 3 times daily.  He can do that in the future once he rusn out of your Qvar.  For now, he can use Qvar 80 mcg 2 puffs 3 times daily in Yellow zone.  -Continue using albuterol inhaler for persistent cough/chest tightness/wheezing/shortness of breath. May use DuoNebs   Interchangeably.     Relevant Medications    ipratropium - albuterol 0.5 mg/2.5 mg/3 mL (DUONEB) 0.5-2.5 (3) MG/3ML neb solution    fluticasone (FLOVENT DISKUS) 100 MCG/BLIST AEPB    predniSONE (DELTASONE) 20 MG tablet    Other Relevant Orders    Spirometry, Breathing Capacity (Completed)    3. Acute bronchitis due to other specified organisms      History of recurrent sinusitis/bronchitis.  Decreased IgM.  Symptoms are more consistent with a bronchitis rather than an isolated asthma exacerbation.  -start azithromycin for 5 days as prescribed, and then take 250 mg by mouth once a day on Mondays, Wednesdays and Fridays.  Will do that until second half of spring.  Prophylactic antibiotics in the past worked well for him.    Relevant Medications    azithromycin (ZITHROMAX) 250 MG tablet    azithromycin (ZITHROMAX) 250 MG tablet (Start on 1/13/2018)            Follow up in 4-6 weeks or sooner if needed.    Thank you for allowing us to participate in the care of this patient. Please feel free to contact us if there are any questions or concerns about the patient.    Disclaimer: This note consists of symbols derived from keyboarding, dictation and/or voice recognition software. As a result, there may be errors in the script that have gone undetected. Please consider this when interpreting information found in this chart.    Jose Angel Leiva MD, [facility]  Allergy, Asthma and Immunology  [place], [place] and Eren Goldberg      Again, thank you for allowing me to  participate in the care of your patient.        Sincerely,        Jose Angel Leiva MD

## 2018-01-08 NOTE — MR AVS SNAPSHOT
After Visit Summary   1/8/2018    Markell Ramsey    MRN: [id number]           Patient Information     Date Of Birth          1960        Visit Information        Provider Department      1/8/2018 10:00 AM Jose Angel Leiva MD [facility]        Today's Diagnoses     Not well controlled moderate persistent asthma    -  1    Acute bronchitis due to other specified organisms        Other chronic rhinitis          Care Instructions    --Start prednisone 40 mg by mouth once a day for 5 days.     -Continue Advair 500/50 mcg 1 puff twice daily.  -In Yellow zone, use Flovent 100 mcg 1 puff 3 times daily.  He can do that in the future once you run out of your Qvar.  For now, you can use Qvar 80 mcg 2 puffs 3 times daily in Yellow zone.  -Continue using albuterol inhaler for persistent cough/chest tightness/wheezing/shortness of breath.  -start azithromycin for 5 days as prescribed, and then take 250 mg by mouth once a day on Mondays, Wednesdays and Fridays.  Will do that until second half of spring.  -Continue with flunisolide and azelastine for your nose.  Use some nasal saline rinse to moisturize nasal mucosa.          Follow-ups after your visit        Who to contact     If you have questions or need follow up information about today's clinic visit or your schedule please contact [facility] directly at [phone].  Normal or non-critical lab and imaging results will be communicated to you by MyChart, letter or phone within 4 business days after the clinic has received the results. If you do not hear from us within 7 days, please contact the clinic through MyChart or phone. If you have a critical or abnormal lab result, we will notify you by phone as soon as possible.  Submit refill requests through [employer] or call your pharmacy and they will forward the refill request to us. Please allow 3 business days for your refill to be completed.          Additional Information About Your  Visit        [employer] Information     [employer] gives you secure access to your electronic health record. If you see a primary care provider, you can also send messages to your care team and make appointments. If you have questions, please call your primary care clinic.  If you do not have a primary care provider, please call [phone] and they will assist you.        Care EveryWhere ID     This is your Care EveryWhere ID. This could be used by other organizations to access your [facility] medical records  [id number]        Your Vitals Were     Pulse Temperature Respirations Pulse Oximetry BMI (Body Mass Index)       102 97.3  F (36.3  C) (Oral) 18 96% 32.9 kg/m2        Blood Pressure from Last 3 Encounters:   01/08/18 139/78   11/03/17 142/85   09/21/17 112/89    Weight from Last 3 Encounters:   01/08/18 101.1 kg (222 lb 14.2 oz)   11/03/17 102.3 kg (225 lb 8.5 oz)   09/21/17 98.4 kg (217 lb)              We Performed the Following     Spirometry, Breathing Capacity          Today's Medication Changes          These changes are accurate as of: 1/8/18 10:51 AM.  If you have any questions, ask your nurse or doctor.               Start taking these medicines.        Dose/Directions    * azithromycin 250 MG tablet   Commonly known as:  ZITHROMAX   Used for:  Acute bronchitis due to other specified organisms   Started by:  Jose Angel Leiva MD        Two tablets first day, then one tablet daily for four days.   Quantity:  6 tablet   Refills:  0       * azithromycin 250 MG tablet   Commonly known as:  ZITHROMAX   Used for:  Acute bronchitis due to other specified organisms   Started by:  Jose Angel Leiva MD        Start taking on:  1/13/2018   Take one tablet by mouth once daily on Mondays, Wednesdays and Fridays.   Quantity:  30 tablet   Refills:  1       fluticasone 100 MCG/BLIST Aepb   Commonly known as:  FLOVENT DISKUS   Used for:  Not well controlled moderate persistent asthma   Started by:  Jose Angel Leiva MD         Dose:  1 puff   Inhale 1 puff into the lungs every 12 hours   Quantity:  60 Inhaler   Refills:  3       ipratropium - albuterol 0.5 mg/2.5 mg/3 mL 0.5-2.5 (3) MG/3ML neb solution   Commonly known as:  DUONEB   Used for:  Not well controlled moderate persistent asthma   Started by:  Jose Angel Leiva MD        Dose:  1 vial   Take 1 vial (3 mLs) by nebulization every 6 hours as needed for shortness of breath / dyspnea or wheezing   Quantity:  30 vial   Refills:  1       predniSONE 20 MG tablet   Commonly known as:  DELTASONE   Used for:  Not well controlled moderate persistent asthma   Started by:  Jose Angel Leiva MD        Dose:  40 mg   Take 2 tablets (40 mg) by mouth daily for 5 days   Quantity:  10 tablet   Refills:  0       * Notice:  This list has 2 medication(s) that are the same as other medications prescribed for you. Read the directions carefully, and ask your doctor or other care provider to review them with you.      These medicines have changed or have updated prescriptions.        Dose/Directions    omeprazole 20 MG CR capsule   Commonly known as:  priLOSEC   This may have changed:  when to take this   Used for:  GERD (gastroesophageal reflux disease)        Dose:  20 mg   Take 1 capsule by mouth 2 times daily.   Quantity:  180 capsule   Refills:  3            Where to get your medicines      These medications were sent to [facility] [address] [place], [place] [address] [facility] AT [facility] OF [address] [place] [address]  [address] [facility] [address]     Phone:  [phone]     azithromycin 250 MG tablet    azithromycin 250 MG tablet    fluticasone 100 MCG/BLIST Aepb    ipratropium - albuterol 0.5 mg/2.5 mg/3 mL 0.5-2.5 (3) MG/3ML neb solution    predniSONE 20 MG tablet                Primary Care Provider Office Phone # Fax #    Marylow Lezama [phone] [address] [facility] [address]        Equal Access to Services     DARY FERNANDEZ AH: Evelyne gonsalves  Keke, waadenikeda luqadaha, qaybta kaaldaniel clark, gustavo franks. So [facility] [phone].    ATENCIÓN: Si yulisa black, tiene a gates disposición servicios gratuitos de asistencia lingüística. Huan al [phone].    We comply with applicable federal civil rights laws and [place] laws. We do not discriminate on the basis of race, color, national origin, age, disability, sex, sexual orientation, or gender identity.            Thank you!     Thank you for choosing [facility]  for your care. Our goal is always to provide you with excellent care. Hearing back from our patients is one way we can continue to improve our services. Please take a few minutes to complete the written survey that you may receive in the mail after your visit with us. Thank you!             Your Updated Medication List - Protect others around you: Learn how to safely use, store and throw away your medicines at www.disposemymeds.org.          This list is accurate as of: 1/8/18 10:51 AM.  Always use your most recent med list.                   Brand Name Dispense Instructions for use Diagnosis    * albuterol (2.5 MG/3ML) 0.083% neb solution     30 vial    Take 1 vial (2.5 mg) by nebulization every 4 hours as needed for shortness of breath / dyspnea    Moderate persistent asthma, uncomplicated       * albuterol 108 (90 BASE) MCG/ACT Inhaler    VENTOLIN HFA    1 Inhaler    Inhale 2 puffs into the lungs every 4 hours as needed Take for shortness of breath, cough or wheeze. Take before exercise.    Moderate persistent asthma, uncomplicated       atorvastatin 10 MG tablet    LIPITOR    90 tablet    Take 1 tablet (10 mg) by mouth daily    Hyperlipidemia LDL goal <130       azelastine 0.1 % spray    ASTELIN    30 mL    Spray 1 spray into both nostrils 2 times daily    Other chronic rhinitis       * azithromycin 250 MG tablet    ZITHROMAX    6 tablet    Two tablets first day, then one tablet daily for four  days.    Acute bronchitis due to other specified organisms       * azithromycin 250 MG tablet   Start taking on:  1/13/2018    ZITHROMAX    30 tablet    Take one tablet by mouth once daily on Mondays, Wednesdays and Fridays.    Acute bronchitis due to other specified organisms       Beclomethasone Dipropionate 80 MCG/ACT Aers Nasal Spray    QNASL    8.7 g    Spray 1-2 sprays into both nostrils daily    Other chronic rhinitis       fexofenadine 180 MG tablet    ALLEGRA    30 tablet    Take 1 tablet (180 mg) by mouth daily    Moderate persistent asthma, uncomplicated       flunisolide 25 MCG/ACT (0.025%) Soln spray    NASALIDE    1 Bottle    Spray 2 sprays into both nostrils 2 times daily    Chronic seasonal allergic rhinitis due to pollen       fluticasone 100 MCG/BLIST Aepb    FLOVENT DISKUS    60 Inhaler    Inhale 1 puff into the lungs every 12 hours    Not well controlled moderate persistent asthma       fluticasone 50 MCG/ACT spray    FLONASE    3 Bottle    Spray 1-2 sprays into both nostrils daily    Seasonal allergic rhinitis due to pollen       fluticasone-salmeterol 500-50 MCG/DOSE diskus inhaler    ADVAIR DISKUS    3 Inhaler    Inhale 1 puff into the lungs 2 times daily    Moderate persistent asthma, uncomplicated       ipratropium - albuterol 0.5 mg/2.5 mg/3 mL 0.5-2.5 (3) MG/3ML neb solution    DUONEB    30 vial    Take 1 vial (3 mLs) by nebulization every 6 hours as needed for shortness of breath / dyspnea or wheezing    Not well controlled moderate persistent asthma       lisinopril-hydrochlorothiazide 10-12.5 MG per tablet    PRINZIDE/ZESTORETIC    90 tablet    Take 1 tablet by mouth daily (Needs follow-up appointment for this medication)    Essential hypertension       montelukast 10 MG tablet    SINGULAIR    90 tablet    Take 1 tablet (10 mg) by mouth At Bedtime    Moderate persistent asthma, uncomplicated       nebulizer/tubing/mouthpiece Kit     1 kit    use with nebulizer    Moderate persistent  asthma, uncomplicated       omeprazole 20 MG CR capsule    priLOSEC    180 capsule    Take 1 capsule by mouth 2 times daily.    GERD (gastroesophageal reflux disease)       predniSONE 20 MG tablet    DELTASONE    10 tablet    Take 2 tablets (40 mg) by mouth daily for 5 days    Not well controlled moderate persistent asthma       * Notice:  This list has 4 medication(s) that are the same as other medications prescribed for you. Read the directions carefully, and ask your doctor or other care provider to review them with you.

## 2018-01-08 NOTE — LETTER
My Asthma Action Plan  Name: Markell Ramsey   YOB: 1960  Date: 1/8/2018  My doctor: Jose Angel Leiva MD   My clinic: [facility]        My Control Medicine: Fluticasone + salmeterol (Advair) -  Diskus 500/50 mcg 1 puff twice daily  Montelukast (Singulair) -  10 mg by mouth once a day  My Rescue Medicine: Albuterol nebulizer solution every 4 hrs as needed  Albuterol (Proair/Ventolin/Proventil) inhaler 2-4 puffs every 4 hrs as needed   My Asthma Severity: moderate persistent  Avoid your asthma triggers: upper respiratory infections               GREEN ZONE   Good Control    I feel good    No cough or wheeze    Can work, sleep and play without asthma symptoms       Take your asthma control medicine every day.     1. If exercise triggers your asthma, take your rescue medication    15 minutes before exercise or sports, and    During exercise if you have asthma symptoms  2. Spacer to use with inhaler: If you have a spacer, make sure to use it with your inhaler             YELLOW ZONE Getting Worse  I have ANY of these:    I do not feel good    Cough or wheeze    Chest feels tight    Wake up at night   1. Keep taking your Green Zone medications, and Start Flovent 100 mcg 1 puff three times daily.   2. Start taking your rescue medicine:    every 20 minutes for up to 1 hour. Then every 4 hours for 24-48 hours.  3. If you stay in the Yellow Zone for more than 48  hours, contact your doctor.             RED ZONE Medical Alert - Get Help  I have ANY of these:    I feel awful    Medicine is not helping    Breathing getting harder    Trouble walking or talking    Nose opens wide to breathe       1. Take your rescue medicine NOW  2. If your provider has prescribed an oral steroid medicine, start taking it NOW  3. Call your doctor NOW  4. If you are still in the Red Zone after 20 minutes and you have not reached your doctor:    Take your rescue medicine again and    Call 911 or go to the emergency room right  away    See your regular doctor within 2 weeks of an Emergency Room or Urgent Care visit for follow-up treatment.        Electronically signed by: Jose Angel Leiva, 1/8/2018      Annual Reminders:  Meet with Asthma Educator,  Flu Shot in the Fall, consider Pneumonia Vaccination for patients with asthma (aged 19 and older).    Pharmacy:    [id number] - [facility] [address] AT [address] OF [facility]  [facility] - [facility] [address] [facility] [place] [facility] [place] [facility]  [id number] - [place], [place] - [address]                    Asthma Triggers  How To Control Things That Make Your Asthma Worse    Triggers are things that make your asthma worse.  Look at the list below to help you find your triggers and what you can do about them.  You can help prevent asthma flare-ups by staying away from your triggers.      Trigger                                                          What you can do   Cigarette Smoke  Tobacco smoke can make asthma worse. Do not allow smoking in your home, car or around you.  Be sure no one smokes at a child s day care or school.  If you smoke, ask your health care provider for ways to help you quit.  Ask family members to quit too.  Ask your health care provider for a referral to Quit Plan to help you quit smoking, or call 9-759-497-PLAN.     Colds, Flu, Bronchitis  These are common triggers of asthma. Wash your hands often.  Don t touch your eyes, nose or mouth.  Get a flu shot every year.     Dust Mites  These are tiny bugs that live in cloth or carpet. They are too small to see. Wash sheets and blankets in hot water every week.   Encase pillows and mattress in dust mite proof covers.  Avoid having carpet if you can. If you have carpet, vacuum weekly.   Use a dust mask and HEPA vacuum.   Pollen and Outdoor Mold  Some people are allergic to trees, grass, or weed pollen, or molds. Try to keep your windows  closed.  Limit time out doors when pollen count is high.   Ask you health care provider about taking medicine during allergy season.     Animal Dander  Some people are allergic to skin flakes, urine or saliva from pets with fur or feathers. Keep pets with fur or feathers out of your home.    If you can t keep the pet outdoors, then keep the pet out of your bedroom.  Keep the bedroom door closed.  Keep pets off cloth furniture and away from stuffed toys.     Mice, Rats, and Cockroaches  Some people are allergic to the waste from these pests.   Cover food and garbage.  Clean up spills and food crumbs.  Store grease in the refrigerator.   Keep food out of the bedroom.   Indoor Mold  This can be a trigger if your home has high moisture. Fix leaking faucets, pipes, or other sources of water.   Clean moldy surfaces.  Dehumidify basement if it is damp and smelly.   Smoke, Strong Odors, and Sprays  These can reduce air quality. Stay away from strong odors and sprays, such as perfume, powder, hair spray, paints, smoke incense, paint, cleaning products, candles and new carpet.   Exercise or Sports  Some people with asthma have this trigger. Be active!  Ask your doctor about taking medicine before sports or exercise to prevent symptoms.    Warm up for 5-10 minutes before and after sports or exercise.     Other Triggers of Asthma  Cold air:  Cover your nose and mouth with a scarf.  Sometimes laughing or crying can be a trigger.  Some medicines and food can trigger asthma.

## 2018-01-08 NOTE — PATIENT INSTRUCTIONS
--Start prednisone 40 mg by mouth once a day for 5 days.     -Continue Advair 500/50 mcg 1 puff twice daily.  -In Yellow zone, use Flovent 100 mcg 1 puff 3 times daily.  He can do that in the future once you run out of your Qvar.  For now, you can use Qvar 80 mcg 2 puffs 3 times daily in Yellow zone.  -Continue using albuterol inhaler for persistent cough/chest tightness/wheezing/shortness of breath.  -start azithromycin for 5 days as prescribed, and then take 250 mg by mouth once a day on Mondays, Wednesdays and Fridays.  Will do that until second half of spring.  -Continue with flunisolide and azelastine for your nose.  Use some nasal saline rinse to moisturize nasal mucosa.

## 2018-01-08 NOTE — PROGRESS NOTES
SUBJECTIVE:                                                                   Markell Ramsey presents today to our Allergy Clinic at [facility]  for a follow up visit.    As you know, he is a 57-year-old male with moderate persistent asthma, allergic rhinitis, recurrent sinus infection/bronchitis and decreased IgM.    November 2017:Normal CH 50. Normal total IgE.  Normal total IgG.  Normal IgG subclasses.   Normal total IgA level. Decreased IgM level. Normal T and B cells ( flow cytometry).  20/22 protective pneumococcal antibody levels. Protective tetanus antibody level.        Sinus CT in 2013, with mild-moderate sinus disease, and DNS on the left. The patient confirms nasal congestion L>R.     IMPRESSION:  Mild/moderate mucosal thickening in the paranasal  sinuses as described above.  Slight worsening of the right maxillary  sinus and worsening of the right frontal sinus since the previous  exam, but clearing of the left sphenoid sinus.      In mide December, his children were sick with respiratory infections. He developed productive green-brownish cough.  He continues having symptoms. Complains of chest tightness, productive cough, wheezing.   He uses albuterol several times times a day but not every day. He uses Advair 500/50 mcg 1 puff twice daily, QVAR 80 mcg 2 puffs 3 times daily, and taking montelukast daily.     Regarding his rhinitis, he has been dong pretty well using flunisolide and azelastine. States that it has been a while since he felt so good from the standpoint of nasal symptoms. The patient denies clear rhinorrhea, nasal itch, stuffiness or interval sinusitis symptoms of fever, facial pain or purulent rhinorrhea.          Patient Active Problem List   Diagnosis     Gastroesophageal reflux disease without esophagitis     Non morbid obesity due to excess calories     CARDIOVASCULAR SCREENING; LDL GOAL LESS THAN 160     IgM deficiency (H)     Moderate persistent asthma, uncomplicated      Essential hypertension     Hyperlipidemia LDL goal <130     DNS (deviated nasal septum)     Other chronic rhinitis       Past Medical History:   Diagnosis Date     Acute bronchitis      Allergic rhinitis, cause unspecified 9/23/2008     Allergic rhinitis, unspecified allergic rhinitis type 9/23/2008     Problem list name updated by automated process. Provider to review     Esophageal reflux      Pneumonia 2/24/2013    MRSA, required hospitalization     Unspecified asthma(493.90)       Problem (# of Occurrences) Relation (Name,Age of Onset)    Allergies (1) Mother: asthma    CANCER (1) Father: lung    Respiratory (3) Sister: asthma, Son (Wisam): Upper Resp infections, Son (David): asthma        Past Surgical History:   Procedure Laterality Date     C APPENDECTOMY  08/2006     SURGICAL HISTORY OF -   2/23/06    L4-5 microdiscectomy     TONSILLECTOMY & ADENOIDECTOMY       Social History     Social History     Marital status:      Spouse name: N/A     Number of children: N/A     Years of education: N/A     Social History Main Topics     Smoking status: Never Smoker     Smokeless tobacco: Never Used     Alcohol use Yes      Comment: rare     Drug use: No     Sexual activity: Not Asked     Other Topics Concern     None     Social History Narrative    November 3, 2017        ENVIRONMENTAL HISTORY: The family lives in [place] [facility] [place] home [place]. The home is heated with a forced air and gas furnace. They does have central air conditioning. The patient's bedroom is furnished with hard naseem in bedroom, allergen mattress cover and allergen pillowcase cover. No pets inside the house. There is no history of cockroach or mice infestation. There are no smokers in the house.  The house does not have a damp basement.                Review of Systems   Constitutional: Positive for fatigue. Negative for activity change and fever.   HENT: Positive for postnasal drip and sneezing. Negative for congestion, dental  problem, ear pain, facial swelling, nosebleeds, rhinorrhea and sinus pressure.    Eyes: Negative for discharge, redness and itching.   Respiratory: Positive for cough, chest tightness, shortness of breath and wheezing.    Cardiovascular: Negative for chest pain.   Gastrointestinal: Negative for diarrhea, nausea and vomiting.   Musculoskeletal: Negative for arthralgias, joint swelling and myalgias.   Skin: Negative for rash.   Neurological: Positive for headaches.   Hematological: Negative for adenopathy.   Psychiatric/Behavioral: Negative for behavioral problems and self-injury.           Current Outpatient Prescriptions:      ipratropium - albuterol 0.5 mg/2.5 mg/3 mL (DUONEB) 0.5-2.5 (3) MG/3ML neb solution, Take 1 vial (3 mLs) by nebulization every 6 hours as needed for shortness of breath / dyspnea or wheezing, Disp: 30 vial, Rfl: 1     fluticasone (FLOVENT DISKUS) 100 MCG/BLIST AEPB, Inhale 1 puff into the lungs every 12 hours, Disp: 60 Inhaler, Rfl: 3     azithromycin (ZITHROMAX) 250 MG tablet, Two tablets first day, then one tablet daily for four days., Disp: 6 tablet, Rfl: 0     [START ON 1/13/2018] azithromycin (ZITHROMAX) 250 MG tablet, Take one tablet by mouth once daily on Mondays, Wednesdays and Fridays., Disp: 30 tablet, Rfl: 1     predniSONE (DELTASONE) 20 MG tablet, Take 2 tablets (40 mg) by mouth daily for 5 days, Disp: 10 tablet, Rfl: 0     flunisolide (NASALIDE) 25 MCG/ACT (0.025%) SOLN spray, Spray 2 sprays into both nostrils 2 times daily, Disp: 1 Bottle, Rfl: 3     azelastine (ASTELIN) 0.1 % spray, Spray 1 spray into both nostrils 2 times daily, Disp: 30 mL, Rfl: 3     montelukast (SINGULAIR) 10 MG tablet, Take 1 tablet (10 mg) by mouth At Bedtime, Disp: 90 tablet, Rfl: 3     albuterol (VENTOLIN HFA) 108 (90 BASE) MCG/ACT Inhaler, Inhale 2 puffs into the lungs every 4 hours as needed Take for shortness of breath, cough or wheeze. Take before exercise., Disp: 1 Inhaler, Rfl: 11      lisinopril-hydrochlorothiazide (PRINZIDE/ZESTORETIC) 10-12.5 MG per tablet, Take 1 tablet by mouth daily (Needs follow-up appointment for this medication), Disp: 90 tablet, Rfl: 3     atorvastatin (LIPITOR) 10 MG tablet, Take 1 tablet (10 mg) by mouth daily, Disp: 90 tablet, Rfl: 3     fluticasone-salmeterol (ADVAIR DISKUS) 500-50 MCG/DOSE diskus inhaler, Inhale 1 puff into the lungs 2 times daily, Disp: 3 Inhaler, Rfl: 3     fexofenadine (ALLEGRA) 180 MG tablet, Take 1 tablet (180 mg) by mouth daily, Disp: 30 tablet, Rfl: 11     omeprazole (PRILOSEC) 20 MG capsule, Take 1 capsule by mouth 2 times daily. (Patient taking differently: Take 20 mg by mouth daily ), Disp: 180 capsule, Rfl: 3     Beclomethasone Dipropionate (QNASL) 80 MCG/ACT AERS Nasal Spray, Spray 1-2 sprays into both nostrils daily (Patient not taking: Reported on 1/8/2018), Disp: 8.7 g, Rfl: 3     fluticasone (FLONASE) 50 MCG/ACT spray, Spray 1-2 sprays into both nostrils daily (Patient not taking: Reported on 11/3/2017), Disp: 3 Bottle, Rfl: 11     albuterol (2.5 MG/3ML) 0.083% nebulizer solution, Take 1 vial (2.5 mg) by nebulization every 4 hours as needed for shortness of breath / dyspnea (Patient not taking: Reported on 9/21/2017), Disp: 30 vial, Rfl: 11     Respiratory Therapy Supplies (NEBULIZER/TUBING/MOUTHPIECE) KIT, use with nebulizer (Patient not taking: Reported on 11/3/2017), Disp: 1 kit, Rfl: 11  Immunization History   Administered Date(s) Administered     Influenza (H1N1) 01/11/2010     Influenza (IIV3) PF 10/29/2008, 08/26/2009, 10/05/2010     Influenza Vaccine IM 3yrs+ 4 Valent IIV4 10/23/2015, 09/21/2017     Pneumococcal 23 valent 01/29/2016     TDAP Vaccine (Adacel) 11/18/2008     No Known Allergies  OBJECTIVE:                                                                 /78 (BP Location: Left arm, Patient Position: Sitting, Cuff Size: Adult Large)  Pulse 102  Temp 97.3  F (36.3  C) (Oral)  Resp 18  Wt 101.1 kg (222 lb  14.2 oz)  SpO2 96%  BMI 32.9 kg/m2        Physical Exam   Constitutional: No distress.   obese   HENT:   Head: Normocephalic and atraumatic.   Right Ear: Tympanic membrane, external ear and ear canal normal.   Left Ear: Tympanic membrane, external ear and ear canal normal.   Nose: Septal deviation (Septal spur on the left noted.) present. No mucosal edema or rhinorrhea.   Mouth/Throat: Oropharynx is clear and moist. No oropharyngeal exudate.   Mildly dry nasal mucosa   Eyes: Conjunctivae are normal. Right eye exhibits no discharge. Left eye exhibits no discharge.   Neck: Normal range of motion.   Cardiovascular: Normal rate, regular rhythm and normal heart sounds.    No murmur heard.  Pulmonary/Chest: Effort normal. No respiratory distress. He has no wheezes. He has rales.   rhonchi that clear with coughing     Musculoskeletal: Normal range of motion.   Neurological: He is alert.   Skin: Skin is warm. He is not diaphoretic.   Psychiatric: Affect normal.   Nursing note and vitals reviewed.            WORKUP:   SPIROMETRY       FVC 4.26L (91% of predicted).     FEV1 3.39L (92% of predicted).     FEV1/FVC 79%     FEF 25%-75%  2.91L/s (91% of predicted).      The office spirometry performed today doesn't suggest an obstruction.    Asthma Control Test (ACT) total score: 13  ASSESSMENT/PLAN:    Problem List Items Addressed This Visit        Respiratory    1. Other chronic rhinitis  Currently well controlled with intranasal flunisolide and azelastine.  -Continue as is. He may use use some nasal saline rinse to moisturize nasal mucosa. Decreasing on azelastine should help as well.                  Other Visit Diagnoses     2. Not well controlled moderate persistent asthma    -  Primary    Currently not well controlled due to bronchitis exacerbation.  -Start prednisone 40 mg by mouth for 5 days.  Once daily  -Continue Advair 500/50 mcg 1 puff twice daily.  -In [place], use Flovent 100 mcg 1 puff 3 times daily.  He can  do that in the future once he rusn out of your Qvar.  For now, he can use Qvar 80 mcg 2 puffs 3 times daily in [place].  -Continue using albuterol inhaler for persistent cough/chest tightness/wheezing/shortness of breath. May use DuoNebs   Interchangeably.     Relevant Medications    ipratropium - albuterol 0.5 mg/2.5 mg/3 mL (DUONEB) 0.5-2.5 (3) MG/3ML neb solution    fluticasone (FLOVENT DISKUS) 100 MCG/BLIST AEPB    predniSONE (DELTASONE) 20 MG tablet    Other Relevant Orders    Spirometry, Breathing Capacity (Completed)    3. Acute bronchitis due to other specified organisms      History of recurrent sinusitis/bronchitis.  Decreased IgM.  Symptoms are more consistent with a bronchitis rather than an isolated asthma exacerbation.  -start azithromycin for 5 days as prescribed, and then take 250 mg by mouth once a day on Mondays, Wednesdays and Fridays.  Will do that until second half of spring.  Prophylactic antibiotics in the past worked well for him.    Relevant Medications    azithromycin (ZITHROMAX) 250 MG tablet    azithromycin (ZITHROMAX) 250 MG tablet (Start on 1/13/2018)            Follow up in 4-6 weeks or sooner if needed.    Thank you for allowing us to participate in the care of this patient. Please feel free to contact us if there are any questions or concerns about the patient.    Disclaimer: This note consists of symbols derived from keyboarding, dictation and/or voice recognition software. As a result, there may be errors in the script that have gone undetected. Please consider this when interpreting information found in this chart.    Jose Angel Leiva MD, [facility]  Allergy, Asthma and Immunology  [place], [place] and [place]

## 2018-01-09 ENCOUNTER — TELEPHONE (OUTPATIENT)
Dept: ALLERGY | Facility: CLINIC | Age: 58
End: 2018-01-09

## 2018-01-09 DIAGNOSIS — J45.40 MODERATE PERSISTENT ASTHMA, UNCOMPLICATED: Primary | Chronic | ICD-10-CM

## 2018-01-09 ASSESSMENT — ASTHMA QUESTIONNAIRES: ACT_TOTALSCORE: 13

## 2018-01-09 NOTE — TELEPHONE ENCOUNTER
Called and spoke with pt regarding notes below. Agreeable. Will call back with any questions or if he has problems filling prescription.   Delphine LU RN

## 2018-01-09 NOTE — TELEPHONE ENCOUNTER
Prescribed Qvar 80 mcg 2 puffs twice daily in [place].  Previously, the patient told me that Qvar was not covered and that was the reason why I prescribed Flovent.

## 2018-01-09 NOTE — TELEPHONE ENCOUNTER
"mac faxed request stating \" plan does not cover medication fluticasone. Per Rx benefit plan alternavtie medications include: Asmanex OR Qvar  Please fax back w/ new rx    Karoline Sosa  [facility] CSS    "

## 2018-04-29 DIAGNOSIS — I10 ESSENTIAL HYPERTENSION: ICD-10-CM

## 2018-04-30 NOTE — TELEPHONE ENCOUNTER
"Requested Prescriptions   Pending Prescriptions Disp Refills     lisinopril-hydrochlorothiazide (PRINZIDE/ZESTORETIC) 10-12.5 MG per tablet [Pharmacy Med Name: LISINOPRIL-HCTZ 10-12.5 MG TAB]  Last Written Prescription Date:  04/25/2017  Last Fill Quantity: 90,  # refills: 3   Last office visit: 9/21/2017 with prescribing provider:  nidhi   Future Office Visit:     60 tablet 0     Sig: TAKE 1 TABLET BY MOUTH ONCE DAILY    Diuretics (Including Combos) Protocol Failed    4/29/2018  5:53 AM       Failed - Normal serum creatinine on file in past 12 months    Recent Labs   Lab Test  02/18/16   0814   CR  1.21             Failed - Normal serum potassium on file in past 12 months    Recent Labs   Lab Test  02/18/16   0814   POTASSIUM  3.9                   Failed - Normal serum sodium on file in past 12 months    Recent Labs   Lab Test  02/18/16   0814   NA  139             Passed - Blood pressure under 140/90 in past 12 months    BP Readings from Last 3 Encounters:   01/08/18 139/78   11/03/17 142/85   09/21/17 112/89                Passed - Recent (12 mo) or future (30 days) visit within the authorizing provider's specialty    Patient had office visit in the last 12 months or has a visit in the next 30 days with authorizing provider or within the authorizing provider's specialty.  See \"Patient Info\" tab in inbasket, or \"Choose Columns\" in Meds & Orders section of the refill encounter.           Passed - Patient is age 18 or older        Tacos SOLITARIO (R)    "

## 2018-05-01 RX ORDER — LISINOPRIL/HYDROCHLOROTHIAZIDE 10-12.5 MG
TABLET ORAL
Qty: 30 TABLET | Refills: 0 | Status: SHIPPED | OUTPATIENT
Start: 2018-05-01 | End: 2018-05-21

## 2018-05-01 NOTE — TELEPHONE ENCOUNTER
Patient contacted about small refill as he needs labs done.  He will have this done tomorrow.  Lab orders extended. Libia BAEZA RN

## 2018-05-02 DIAGNOSIS — E78.5 HYPERLIPIDEMIA LDL GOAL <130: ICD-10-CM

## 2018-05-02 DIAGNOSIS — J32.8 OTHER CHRONIC SINUSITIS: ICD-10-CM

## 2018-05-02 DIAGNOSIS — I10 ESSENTIAL HYPERTENSION: ICD-10-CM

## 2018-05-02 DIAGNOSIS — D80.4 IGM DEFICIENCY (H): Chronic | ICD-10-CM

## 2018-05-02 LAB
ALT SERPL W P-5'-P-CCNC: 31 U/L (ref 0–70)
ANION GAP SERPL CALCULATED.3IONS-SCNC: 4 MMOL/L (ref 3–14)
BASOPHILS # BLD AUTO: 0.1 10E9/L (ref 0–0.2)
BASOPHILS NFR BLD AUTO: 0.5 %
BUN SERPL-MCNC: 13 MG/DL (ref 7–30)
CALCIUM SERPL-MCNC: 8.4 MG/DL (ref 8.5–10.1)
CHLORIDE SERPL-SCNC: 102 MMOL/L (ref 94–109)
CHOLEST SERPL-MCNC: 182 MG/DL
CO2 SERPL-SCNC: 32 MMOL/L (ref 20–32)
CREAT SERPL-MCNC: 1.13 MG/DL (ref 0.66–1.25)
DIFFERENTIAL METHOD BLD: NORMAL
EOSINOPHIL # BLD AUTO: 0.2 10E9/L (ref 0–0.7)
EOSINOPHIL NFR BLD AUTO: 1.7 %
ERYTHROCYTE [DISTWIDTH] IN BLOOD BY AUTOMATED COUNT: 12.5 % (ref 10–15)
GFR SERPL CREATININE-BSD FRML MDRD: 67 ML/MIN/1.7M2
GLUCOSE SERPL-MCNC: 107 MG/DL (ref 70–99)
HCT VFR BLD AUTO: 43 % (ref 40–53)
HDLC SERPL-MCNC: 43 MG/DL
HGB BLD-MCNC: 14.7 G/DL (ref 13.3–17.7)
LDLC SERPL CALC-MCNC: 105 MG/DL
LYMPHOCYTES # BLD AUTO: 2.3 10E9/L (ref 0.8–5.3)
LYMPHOCYTES NFR BLD AUTO: 21.9 %
MCH RBC QN AUTO: 30.2 PG (ref 26.5–33)
MCHC RBC AUTO-ENTMCNC: 34.2 G/DL (ref 31.5–36.5)
MCV RBC AUTO: 88 FL (ref 78–100)
MONOCYTES # BLD AUTO: 0.8 10E9/L (ref 0–1.3)
MONOCYTES NFR BLD AUTO: 7.6 %
NEUTROPHILS # BLD AUTO: 7.1 10E9/L (ref 1.6–8.3)
NEUTROPHILS NFR BLD AUTO: 68.3 %
NONHDLC SERPL-MCNC: 139 MG/DL
PLATELET # BLD AUTO: 301 10E9/L (ref 150–450)
POTASSIUM SERPL-SCNC: 3.7 MMOL/L (ref 3.4–5.3)
RBC # BLD AUTO: 4.87 10E12/L (ref 4.4–5.9)
SODIUM SERPL-SCNC: 138 MMOL/L (ref 133–144)
TRIGL SERPL-MCNC: 171 MG/DL
WBC # BLD AUTO: 10.3 10E9/L (ref 4–11)

## 2018-05-02 PROCEDURE — 80061 LIPID PANEL: CPT | Performed by: INTERNAL MEDICINE

## 2018-05-02 PROCEDURE — 85025 COMPLETE CBC W/AUTO DIFF WBC: CPT | Performed by: ALLERGY & IMMUNOLOGY

## 2018-05-02 PROCEDURE — 80048 BASIC METABOLIC PNL TOTAL CA: CPT | Performed by: INTERNAL MEDICINE

## 2018-05-02 PROCEDURE — 84460 ALANINE AMINO (ALT) (SGPT): CPT | Performed by: INTERNAL MEDICINE

## 2018-05-02 PROCEDURE — 36415 COLL VENOUS BLD VENIPUNCTURE: CPT | Performed by: INTERNAL MEDICINE

## 2018-05-10 DIAGNOSIS — J31.0 OTHER CHRONIC RHINITIS: ICD-10-CM

## 2018-05-10 RX ORDER — AZELASTINE 1 MG/ML
1 SPRAY, METERED NASAL 2 TIMES DAILY
Qty: 30 ML | Refills: 3 | Status: SHIPPED | OUTPATIENT
Start: 2018-05-10 | End: 2018-10-03

## 2018-05-10 NOTE — TELEPHONE ENCOUNTER
Last Office Visit 1/8/18 ACT 13  FU requested in 4-6 weeks did not happen  Patient does pass [facility] refill protocol  Routed to Dr Leiva for consideration  Ronald Kent RN

## 2018-05-10 NOTE — TELEPHONE ENCOUNTER
azelastine (ASTELIN) 0.1 % spray    Date Last Filled: 03/29/18  QTY: 30 ml    Sulema Stone  [facility]

## 2018-05-21 DIAGNOSIS — I10 ESSENTIAL HYPERTENSION: ICD-10-CM

## 2018-05-21 RX ORDER — LISINOPRIL/HYDROCHLOROTHIAZIDE 10-12.5 MG
1 TABLET ORAL DAILY
Qty: 90 TABLET | Refills: 1 | Status: SHIPPED | OUTPATIENT
Start: 2018-05-21 | End: 2018-09-27

## 2018-05-21 NOTE — TELEPHONE ENCOUNTER
Pt is leaving on a cruise to [place] in 2 days and will run out of his lisinopril. Please call in refill ASAP. Thanks.    lisinopril-hydrochlorothiazide (PRINZIDE/ZESTORETIC) 10-12.5 MG per tablet  Last Written Prescription Date:  5/1/18  Last Fill Quantity: 30,   # refills: 0  Last Office Visit: 9/21/17  Future Office visit:

## 2018-05-21 NOTE — TELEPHONE ENCOUNTER
"Requested Prescriptions   Pending Prescriptions Disp Refills     lisinopril-hydrochlorothiazide (PRINZIDE/ZESTORETIC) 10-12.5 MG per tablet 30 tablet 0     Sig: Take 1 tablet by mouth daily    Diuretics (Including Combos) Protocol Passed    5/21/2018  1:17 PM       Passed - Blood pressure under 140/90 in past 12 months    BP Readings from Last 3 Encounters:   01/08/18 139/78   11/03/17 142/85   09/21/17 112/89                Passed - Recent (12 mo) or future (30 days) visit within the authorizing provider's specialty    Patient had office visit in the last 12 months or has a visit in the next 30 days with authorizing provider or within the authorizing provider's specialty.  See \"Patient Info\" tab in inbasket, or \"Choose Columns\" in Meds & Orders section of the refill encounter.           Passed - Patient is age 18 or older       Passed - Normal serum creatinine on file in past 12 months    Recent Labs   Lab Test  05/02/18   0927   CR  1.13             Passed - Normal serum potassium on file in past 12 months    Recent Labs   Lab Test  05/02/18   0927   POTASSIUM  3.7                   Passed - Normal serum sodium on file in past 12 months    Recent Labs   Lab Test  05/02/18   0927   NA  138              Prescription approved per [facility] Refill Protocol.  Lacey Quiles RNC    "

## 2018-05-24 ENCOUNTER — TELEPHONE (OUTPATIENT)
Dept: FAMILY MEDICINE | Facility: CLINIC | Age: 58
End: 2018-05-24

## 2018-05-24 DIAGNOSIS — I10 ESSENTIAL HYPERTENSION: ICD-10-CM

## 2018-05-24 NOTE — TELEPHONE ENCOUNTER
Called pt, whose VM states he will be out of the country through Monday 6/11/18, with no access to phone or e-mail.  Will postpone this encounter.    Wendy HARRINGTON RN

## 2018-05-24 NOTE — TELEPHONE ENCOUNTER
Message not read yet, will keep open till can verify patient has seen the my chart.    PAULETTE Nguyen

## 2018-05-24 NOTE — TELEPHONE ENCOUNTER
This was just done 5/21/18:    lisinopril-hydrochlorothiazide (PRINZIDE/ZESTORETIC) 10-12.5 MG per tablet 90 tablet 1 5/21/2018     Sig - Route: Take 1 tablet by mouth daily - Oral    Class: E-Prescribe    Order: [id number]    E-Prescribing Status: Receipt confirmed by pharmacy (5/21/2018  1:25 PM CDT)      Local [employer] message sent to patient.    Wendy HARRINGTON RN

## 2018-05-29 DIAGNOSIS — I10 ESSENTIAL HYPERTENSION: ICD-10-CM

## 2018-05-29 NOTE — TELEPHONE ENCOUNTER
"Requested Prescriptions   Pending Prescriptions Disp Refills     lisinopril-hydrochlorothiazide (PRINZIDE/ZESTORETIC) 10-12.5 MG per tablet [Pharmacy Med Name: LISINOPRIL-HCTZ 10-12.5 MG TAB]        Last Written Prescription Date:  5-21-18  Last Fill Quantity: 90,   # refills: 1  Last Office Visit: 9-21-17  Future Office visit:      30 tablet 0     Sig: TAKE 1 TABLET BY MOUTH ONCE DAILY. NEED LABS DONE FOR FURTHER REFILLS.    Diuretics (Including Combos) Protocol Passed    5/29/2018  1:17 AM       Passed - Blood pressure under 140/90 in past 12 months    BP Readings from Last 3 Encounters:   01/08/18 139/78   11/03/17 142/85   09/21/17 112/89                Passed - Recent (12 mo) or future (30 days) visit within the authorizing provider's specialty    Patient had office visit in the last 12 months or has a visit in the next 30 days with authorizing provider or within the authorizing provider's specialty.  See \"Patient Info\" tab in inbasket, or \"Choose Columns\" in Meds & Orders section of the refill encounter.           Passed - Patient is age 18 or older       Passed - Normal serum creatinine on file in past 12 months    Recent Labs   Lab Test  05/02/18 0927   CR  1.13             Passed - Normal serum potassium on file in past 12 months    Recent Labs   Lab Test  05/02/18 0927   POTASSIUM  3.7                   Passed - Normal serum sodium on file in past 12 months    Recent Labs   Lab Test  05/02/18 0927   NA  138                "

## 2018-05-30 RX ORDER — LISINOPRIL/HYDROCHLOROTHIAZIDE 10-12.5 MG
TABLET ORAL
Qty: 30 TABLET | Refills: 0 | OUTPATIENT
Start: 2018-05-30

## 2018-06-11 DIAGNOSIS — J30.1 CHRONIC SEASONAL ALLERGIC RHINITIS DUE TO POLLEN: ICD-10-CM

## 2018-06-11 RX ORDER — FLUNISOLIDE 0.25 MG/ML
2 SOLUTION NASAL 2 TIMES DAILY
Qty: 1 BOTTLE | Refills: 0 | Status: SHIPPED | OUTPATIENT
Start: 2018-06-11 | End: 2018-07-13

## 2018-06-11 NOTE — TELEPHONE ENCOUNTER
Refill request for flunisolide. Last office visit 1/8/2018. Follow up requested for 4-6 weeks. No future appointments scheduled.     Please advise. Thank you.  Delphine LU RN

## 2018-06-11 NOTE — TELEPHONE ENCOUNTER
One-time refill for flunisolide.  For future refills, the patient needs a follow-up.  Jose Angel Leiva

## 2018-06-11 NOTE — TELEPHONE ENCOUNTER
Writer did call patient to inform him.  Patient stated that the azelastine and the flunisolide are helping to alleviate his symptoms by better controlling his post nasal drip.  Patient asked me to relay this to Dr Leiva and to thank him for the treatment.  Patient asked to be transferred to scheduling so that he could make an appointment to see Dr Leiva as soon as possible and was intending to call us to make this appointment.  Patient states he either lost his Advair Inhalers or [facility] in [place] has poor record keeping skills but either way patient states he is currently out of advair and has been using an older QVar that he had sitting around from a previous prescription.  Writer asked patient if he would like writer to create a refill request for Advair, patient stated he has been on 5-550 for a long time but now feels his symptoms are controlled so well that he is wondering about decreasing his Advair dose and wants to speak with Dr Leiva about it at the appointment so writer did not create the refill request at this time.  Routed back to Dr Leiva as DANIEL Kent RN

## 2018-06-12 ENCOUNTER — OFFICE VISIT (OUTPATIENT)
Dept: ALLERGY | Facility: CLINIC | Age: 58
End: 2018-06-12
Payer: COMMERCIAL

## 2018-06-12 VITALS
DIASTOLIC BLOOD PRESSURE: 66 MMHG | BODY MASS INDEX: 33.71 KG/M2 | SYSTOLIC BLOOD PRESSURE: 131 MMHG | WEIGHT: 228.4 LBS | HEART RATE: 80 BPM | OXYGEN SATURATION: 94 % | RESPIRATION RATE: 16 BRPM | TEMPERATURE: 97.9 F

## 2018-06-12 DIAGNOSIS — J45.40 MODERATE PERSISTENT ASTHMA, UNCOMPLICATED: Primary | Chronic | ICD-10-CM

## 2018-06-12 DIAGNOSIS — J31.0 OTHER CHRONIC RHINITIS: ICD-10-CM

## 2018-06-12 DIAGNOSIS — J01.90 ACUTE SINUSITIS WITH SYMPTOMS > 10 DAYS: ICD-10-CM

## 2018-06-12 LAB
FEF 25/75: NORMAL
FEV-1: NORMAL
FEV1/FVC: NORMAL
FVC: NORMAL

## 2018-06-12 PROCEDURE — 99214 OFFICE O/P EST MOD 30 MIN: CPT | Mod: 25 | Performed by: ALLERGY & IMMUNOLOGY

## 2018-06-12 PROCEDURE — 94010 BREATHING CAPACITY TEST: CPT | Performed by: ALLERGY & IMMUNOLOGY

## 2018-06-12 ASSESSMENT — ENCOUNTER SYMPTOMS
DIARRHEA: 0
VOMITING: 0
WHEEZING: 0
ADENOPATHY: 0
MYALGIAS: 0
HEADACHES: 1
COUGH: 1
EYE ITCHING: 1
ARTHRALGIAS: 0
SINUS PRESSURE: 1
FEVER: 0
FATIGUE: 0
EYE REDNESS: 0
SHORTNESS OF BREATH: 0
EYE DISCHARGE: 0
FACIAL SWELLING: 0
ACTIVITY CHANGE: 0
RHINORRHEA: 1
NAUSEA: 0
JOINT SWELLING: 0
CHEST TIGHTNESS: 0

## 2018-06-12 NOTE — LETTER
6/12/2018         RE: Markell Ramsey  [address] [id number]        Dear Colleague,    Thank you for referring your patient, Markell Rasmey, to the [facility]. Please see a copy of my visit note below.    SUBJECTIVE:                                                                 Markell Ramsey presents today to our Allergy Clinic at [facility]  for a follow up visit.    As you know, he is a 58-year-old male with moderate persistent asthma, allergic rhinitis, recurrent sinus infection/bronchitis and decreased IgM.     November 2017:Normal CH 50. Normal total IgE.  Normal total IgG.  Normal IgG subclasses.   Normal total IgA level. Decreased IgM level on multiple occasions. Normal T and B cells (flow cytometry).  20/22 protective pneumococcal antibody levels. Protective tetanus antibody level.     SPT in 2007 was positive to molds, tree pollen, grass mix, and ragweed. IDs were positive for cat, roaches,  dog, mite. Aspergillus mold, and marsh elder. Started by Dr. Whittington on allergen IT in 2007,  [facility] Whittington used to practice before. He received allergen immunotherapy on and off until 2010, which he found partially helpful.        Sinus CT in 2013, with mild-moderate sinus disease, and DNS on the left. The patient confirms nasal congestion L>R.      IMPRESSION:  Mild/moderate mucosal thickening in the paranasal  sinuses as described above.  Slight worsening of the right maxillary  sinus and worsening of the right frontal sinus since the previous  exam, but clearing of the left sphenoid sinus.    He just came back from a cruise from [place], [place], and [place].    His rhinitis symptoms were pretty well controlled with flunisolide, azelastine and montelukast 10 mg by mouth daily.  He got sick on the cruise.  Developed sinus pressure, green rhinorrhea, postnasal drainage, nasal congestion.  Has been going on for the last 2 weeks.   He does not think that he is getting significantly better.    He also ran out of Advair while he was on the cruise.  He took some leftover self Qvar and thought it was somewhat helpful.  He still developed a cough, and he is not sure if it is asthma related respiratory infection related.  He uses albuterol 2-3 times a week.  He continues taking montelukast 10 mg by mouth daily.  No interval steroid use.  No ED/PCP/urgent care/other specialist visits for asthma flare since the last visit.  The patient states that he was given 1 Advair inhaler in the pharmacy, but the pharmacy says that they gave him 3 month supply.  Now, he cannot get the refill.    Patient Active Problem List   Diagnosis     Gastroesophageal reflux disease without esophagitis     Non morbid obesity due to excess calories     CARDIOVASCULAR SCREENING; LDL GOAL LESS THAN 160     IgM deficiency (H)     Moderate persistent asthma, uncomplicated     Essential hypertension     Hyperlipidemia LDL goal <130     DNS (deviated nasal septum)     Other chronic rhinitis       Past Medical History:   Diagnosis Date     Acute bronchitis      Allergic rhinitis, cause unspecified 9/23/2008     Allergic rhinitis, unspecified allergic rhinitis type 9/23/2008     Problem list name updated by automated process. Provider to review     Esophageal reflux      Pneumonia 2/24/2013    MRSA, required hospitalization     Unspecified asthma(493.90)       Problem (# of Occurrences) Relation (Name,Age of Onset)    Allergies (1) Mother: asthma    CANCER (1) Father: lung    Respiratory (3) Sister: asthma, Son (Wisam): Upper Resp infections, Son (David): asthma        Past Surgical History:   Procedure Laterality Date     C APPENDECTOMY  08/2006     SURGICAL HISTORY OF -   2/23/06    L4-5 microdiscectomy     TONSILLECTOMY & ADENOIDECTOMY       Social History     Social History     Marital status:      Spouse name: N/A     Number of children: N/A     Years of education: N/A      Social History Main Topics     Smoking status: Never Smoker     Smokeless tobacco: Never Used     Alcohol use Yes      Comment: rare     Drug use: No     Sexual activity: Not Asked     Other Topics Concern     None     Social History Narrative    November 3, 2017        ENVIRONMENTAL HISTORY: [place] The home is heated with a forced air and gas furnace. They does have central air conditioning. The patient's bedroom is furnished with hard naseem in bedroom, allergen mattress cover and allergen pillowcase cover. No pets inside the house. There is no history of cockroach or mice infestation. There are no smokers in the house.  The house does not have a damp basement.                Review of Systems   Constitutional: Negative for activity change, fatigue and fever.   HENT: Positive for congestion, postnasal drip, rhinorrhea, sinus pressure and sneezing. Negative for dental problem, ear pain, facial swelling and nosebleeds.    Eyes: Positive for itching. Negative for discharge and redness.   Respiratory: Positive for cough. Negative for chest tightness, shortness of breath and wheezing.    Cardiovascular: Negative for chest pain.   Gastrointestinal: Negative for diarrhea, nausea and vomiting.   Musculoskeletal: Negative for arthralgias, joint swelling and myalgias.   Skin: Negative for rash.   Neurological: Positive for headaches.   Hematological: Negative for adenopathy.   Psychiatric/Behavioral: Negative for behavioral problems and self-injury.           Current Outpatient Prescriptions:      albuterol (2.5 MG/3ML) 0.083% nebulizer solution, Take 1 vial (2.5 mg) by nebulization every 4 hours as needed for shortness of breath / dyspnea, Disp: 30 vial, Rfl: 11     albuterol (VENTOLIN HFA) 108 (90 BASE) MCG/ACT Inhaler, Inhale 2 puffs into the lungs every 4 hours as needed Take for shortness of breath, cough or wheeze. Take before exercise., Disp: 1 Inhaler, Rfl:  11     amoxicillin-clavulanate (AUGMENTIN) 875-125 MG per tablet, Take 1 tablet by mouth 2 times daily, Disp: 20 tablet, Rfl: 0     atorvastatin (LIPITOR) 10 MG tablet, Take 1 tablet (10 mg) by mouth daily, Disp: 90 tablet, Rfl: 3     azelastine (ASTELIN) 0.1 % spray, Spray 1 spray into both nostrils 2 times daily, Disp: 30 mL, Rfl: 3     flunisolide (NASALIDE) 25 MCG/ACT (0.025%) SOLN spray, Spray 2 sprays into both nostrils 2 times daily, Disp: 1 Bottle, Rfl: 0     fluticasone (FLOVENT DISKUS) 100 MCG/BLIST AEPB, Inhale 1 puff into the lungs every 12 hours, Disp: 60 Inhaler, Rfl: 3     fluticasone-vilanterol (BREO ELLIPTA) 200-25 MCG/INH oral inhaler, Inhale 1 puff into the lungs daily, Disp: 1 Inhaler, Rfl: 3     ipratropium - albuterol 0.5 mg/2.5 mg/3 mL (DUONEB) 0.5-2.5 (3) MG/3ML neb solution, Take 1 vial (3 mLs) by nebulization every 6 hours as needed for shortness of breath / dyspnea or wheezing, Disp: 30 vial, Rfl: 1     lisinopril-hydrochlorothiazide (PRINZIDE/ZESTORETIC) 10-12.5 MG per tablet, Take 1 tablet by mouth daily, Disp: 90 tablet, Rfl: 1     montelukast (SINGULAIR) 10 MG tablet, Take 1 tablet (10 mg) by mouth At Bedtime, Disp: 90 tablet, Rfl: 3     omeprazole (PRILOSEC) 20 MG capsule, Take 1 capsule by mouth 2 times daily. (Patient taking differently: Take 20 mg by mouth daily ), Disp: 180 capsule, Rfl: 3     Respiratory Therapy Supplies (NEBULIZER/TUBING/MOUTHPIECE) KIT, use with nebulizer, Disp: 1 kit, Rfl: 11  Immunization History   Administered Date(s) Administered     Influenza (H1N1) 01/11/2010     Influenza (IIV3) PF 10/29/2008, 08/26/2009, 10/05/2010     Influenza Vaccine IM 3yrs+ 4 Valent IIV4 10/23/2015, 09/21/2017     Pneumococcal 23 valent 01/29/2016     TDAP Vaccine (Adacel) 11/18/2008     No Known Allergies  OBJECTIVE:                                                                 /66 (BP Location: Left arm, Patient Position: Sitting, Cuff Size: Adult Regular)  Pulse 80   Temp 97.9  F (36.6  C) (Tympanic)  Resp 16  Wt 103.6 kg (228 lb 6.3 oz)  SpO2 94%  BMI 33.71 kg/m2        Physical Exam   Constitutional: No distress.   obese   HENT:   Head: Normocephalic and atraumatic.   Right Ear: Tympanic membrane, external ear and ear canal normal.   Left Ear: Tympanic membrane, external ear and ear canal normal.   Nose: Rhinorrhea (mucoid) and septal deviation (Septal spur on the left noted.) present. No mucosal edema.   Mouth/Throat: Oropharynx is clear and moist and mucous membranes are normal. No oropharyngeal exudate, posterior oropharyngeal edema or posterior oropharyngeal erythema.   Eyes: Conjunctivae are normal. Right eye exhibits no discharge. Left eye exhibits no discharge.   Neck: Normal range of motion.   Cardiovascular: Normal rate, regular rhythm and normal heart sounds.    No murmur heard.  Pulmonary/Chest: Effort normal. No respiratory distress. He has no wheezes. He has no rales.        Musculoskeletal: Normal range of motion.   Neurological: He is alert.   Skin: Skin is warm. He is not diaphoretic.   Psychiatric: Affect normal.   Nursing note and vitals reviewed.    WORKUP:   SPIROMETRY       FVC 4.28L (92% of predicted).     FEV1 3.52L (97% of predicted).     FEV1/FVC 82%     FEF 25%-75%  3.78L/s (120% of predicted)    The office spirometry performed today doesn't suggest an obstruction.      Asthma Control Test (ACT) total score: 19       ASSESSMENT/PLAN:    Problem List Items Addressed This Visit        Respiratory    1. Moderate persistent asthma, uncomplicated - Primary (Chronic)  To help him with Advair situation, I recommend not to continue inhaled Qvar, but start Breo Ellipta 200/25 mcg 1 puff once daily.  He is open to try a stepdown therapy, and I think it is reasonable to do once he is better.  -Continue using albuterol inhaler 2-4 puffs every 4-6 hours as needed for chest tightness/wheezing/shortness of breath/persistent cough.  -Use it with chamber device.     Relevant Medications    fluticasone-vilanterol (BREO ELLIPTA) 200-25 MCG/INH oral inhaler    Other Relevant Orders    Spirometry, Breathing Capacity (Completed)    OPTICHAMBER (Completed)    2. Other chronic rhinitis  Currently, suboptimally controlled due to recently developed sinusitis.  However, he was doing well on on a combination of intranasal flunisolide, azelastine, and montelukast 10 mg by mouth daily.  -Continue as is.    Other Visit Diagnoses           3. Acute sinusitis with symptoms > 10 days        Relevant Medications    amoxicillin-clavulanate (AUGMENTIN) 875-125 MG per tablet     Follow up in 4/6 weeks or sooner if needed.     Thank you for allowing us to participate in the care of this patient. Please feel free to contact us if there are any questions or concerns about the patient.    Disclaimer: This note consists of symbols derived from keyboarding, dictation and/or voice recognition software. As a result, there may be errors in the script that have gone undetected. Please consider this when interpreting information found in this chart.    Jose Angel Leiva MD, [facility]  Allergy, Asthma and Immunology  [place], [place] and Eren Goldberg      Again, thank you for allowing me to participate in the care of your patient.        Sincerely,        Jose Angel Leiva MD

## 2018-06-12 NOTE — LETTER
My Asthma Action Plan  Name: Markell Ramsey   YOB: 1960  Date: 6/12/2018    My doctor: Jose Angel Leiva MD   My clinic: [facility]        My Control Medicine: Fluticasone furoate + vilanterol (Breo Ellipta)-  200/25mcg 1 puff once daily  Montelukast (Singulair) -  10 mg by mouth once a day  My Rescue Medicine: Albuterol nebulizer solution every 4 hrs as needed  Albuterol (Proair/Ventolin/Proventil) inhaler 2-4 puffs every 4 hrs as needed   My Asthma Severity: moderate persistent  Avoid your asthma triggers: upper respiratory infections               GREEN ZONE   Good Control    I feel good    No cough or wheeze    Can work, sleep and play without asthma symptoms       Take your asthma control medicine every day.     1. If exercise triggers your asthma, take your rescue medication    15 minutes before exercise or sports, and    During exercise if you have asthma symptoms  2. Spacer to use with inhaler: If you have a spacer, make sure to use it with your inhaler             YELLOW ZONE Getting Worse  I have ANY of these:    I do not feel good    Cough or wheeze    Chest feels tight    Wake up at night   1. Keep taking your Green Zone medications, and Start Flovent 100 mcg 1 puff three times daily.   2. Start taking your rescue medicine:    every 20 minutes for up to 1 hour. Then every 4 hours for 24-48 hours.  3. If you stay in the Yellow Zone for more than 48  hours, contact your doctor.             RED ZONE Medical Alert - Get Help  I have ANY of these:    I feel awful    Medicine is not helping    Breathing getting harder    Trouble walking or talking    Nose opens wide to breathe       1. Take your rescue medicine NOW  2. If your provider has prescribed an oral steroid medicine, start taking it NOW  3. Call your doctor NOW  4. If you are still in the Red Zone after 20 minutes and you have not reached your doctor:    Take your rescue medicine again and    Call 911 or go to the emergency room  right away    See your regular doctor within 2 weeks of an Emergency Room or Urgent Care visit for follow-up treatment.        Electronically signed by: Jose Angel Leiva, 6/12/2018        Annual Reminders:  Meet with Asthma Educator,  Flu Shot in the Fall, consider Pneumonia Vaccination for patients with asthma (aged 19 and older).    Pharmacy:    [id number] - [facility] [address] AT [facility] [address] AT [place] [facility] [place] [facility]  [id number] - [place], [place] - [address]                    Asthma Triggers  How To Control Things That Make Your Asthma Worse    Triggers are things that make your asthma worse.  Look at the list below to help you find your triggers and what you can do about them.  You can help prevent asthma flare-ups by staying away from your triggers.      Trigger                                                          What you can do   Cigarette Smoke  Tobacco smoke can make asthma worse. Do not allow smoking in your home, car or around you.  Be sure no one smokes at a child s day care or school.  If you smoke, ask your health care provider for ways to help you quit.  Ask family members to quit too.  Ask your health care provider for a referral to Quit Plan to help you quit smoking, or call 8-908-087-PLAN.     Colds, Flu, Bronchitis  These are common triggers of asthma. Wash your hands often.  Don t touch your eyes, nose or mouth.  Get a flu shot every year.     Dust Mites  These are tiny bugs that live in cloth or carpet. They are too small to see. Wash sheets and blankets in hot water every week.   Encase pillows and mattress in dust mite proof covers.  Avoid having carpet if you can. If you have carpet, vacuum weekly.   Use a dust mask and HEPA vacuum.   Pollen and Outdoor Mold  Some people are allergic to trees, grass, or weed pollen, or molds. Try to keep your  windows closed.  Limit time out doors when pollen count is high.   Ask you health care provider about taking medicine during allergy season.     Animal Dander  Some people are allergic to skin flakes, urine or saliva from pets with fur or feathers. Keep pets with fur or feathers out of your home.    If you can t keep the pet outdoors, then keep the pet out of your bedroom.  Keep the bedroom door closed.  Keep pets off cloth furniture and away from stuffed toys.     Mice, Rats, and Cockroaches  Some people are allergic to the waste from these pests.   Cover food and garbage.  Clean up spills and food crumbs.  Store grease in the refrigerator.   Keep food out of the bedroom.   Indoor Mold  This can be a trigger if your home has high moisture. Fix leaking faucets, pipes, or other sources of water.   Clean moldy surfaces.  Dehumidify basement if it is damp and smelly.   Smoke, Strong Odors, and Sprays  These can reduce air quality. Stay away from strong odors and sprays, such as perfume, powder, hair spray, paints, smoke incense, paint, cleaning products, candles and new carpet.   Exercise or Sports  Some people with asthma have this trigger. Be active!  Ask your doctor about taking medicine before sports or exercise to prevent symptoms.    Warm up for 5-10 minutes before and after sports or exercise.     Other Triggers of Asthma  Cold air:  Cover your nose and mouth with a scarf.  Sometimes laughing or crying can be a trigger.  Some medicines and food can trigger asthma.

## 2018-06-12 NOTE — MR AVS SNAPSHOT
After Visit Summary   6/12/2018    Markell Ramsey    MRN: [id number]           Patient Information     Date Of Birth          1960        Visit Information        Provider Department      6/12/2018 8:20 AM Jose Angel Leiva MD [facility]        Today's Diagnoses     Moderate persistent asthma, uncomplicated    -  1    Other chronic rhinitis        Acute sinusitis with symptoms > 10 days           Follow-ups after your visit        Follow-up notes from your care team     Return in about 6 weeks (around 7/23/2018), or if symptoms worsen or fail to improve, for rhinitis follow up, asthma follow up.      Your next 10 appointments already scheduled     Jul 23, 2018  8:40 AM CDT   Return Visit with Jose Angel Leiva MD   [facility] ([facility]    [address] [address] [phone]              Who to contact     If you have questions or need follow up information about today's clinic visit or your schedule please contact [facility] directly at [phone].  Normal or non-critical lab and imaging results will be communicated to you by MyChart, letter or phone within 4 business days after the clinic has received the results. If you do not hear from us within 7 days, please contact the clinic through [employer] or phone. If you have a critical or abnormal lab result, we will notify you by phone as soon as possible.  Submit refill requests through [employer] or call your pharmacy and they will forward the refill request to us. Please allow 3 business days for your refill to be completed.          Additional Information About Your Visit        MyChart Information     Campanda gives you secure access to your electronic health record. If you see a primary care provider, you can also send messages to your care team and make appointments. If you have questions, please call your primary care clinic.  If you do not have a primary care provider,  please call [phone] and they will assist you.        Care EveryWhere ID     This is your Care EveryWhere ID. This could be used by other organizations to access your [place] medical records  [id number]        Your Vitals Were     Pulse Temperature Respirations Pulse Oximetry BMI (Body Mass Index)       80 97.9  F (36.6  C) (Tympanic) 16 94% 33.71 kg/m2        Blood Pressure from Last 3 Encounters:   06/12/18 131/66   01/08/18 139/78   11/03/17 142/85    Weight from Last 3 Encounters:   06/12/18 103.6 kg (228 lb 6.3 oz)   01/08/18 101.1 kg (222 lb 14.2 oz)   11/03/17 102.3 kg (225 lb 8.5 oz)              We Performed the Following     OPTICHAMBER     Spirometry, Breathing Capacity          Today's Medication Changes          These changes are accurate as of 6/12/18 11:59 PM.  If you have any questions, ask your nurse or doctor.               Start taking these medicines.        Dose/Directions    amoxicillin-clavulanate 875-125 MG per tablet   Commonly known as:  AUGMENTIN   Used for:  Acute sinusitis with symptoms > 10 days   Started by:  Jose Angel Leiva MD        Dose:  1 tablet   Take 1 tablet by mouth 2 times daily   Quantity:  20 tablet   Refills:  0       fluticasone-vilanterol 200-25 MCG/INH oral inhaler   Commonly known as:  BREO ELLIPTA   Used for:  Moderate persistent asthma, uncomplicated   Started by:  Jose Angel Leiva MD        Dose:  1 puff   Inhale 1 puff into the lungs daily   Quantity:  1 Inhaler   Refills:  3         These medicines have changed or have updated prescriptions.        Dose/Directions    omeprazole 20 MG CR capsule   Commonly known as:  priLOSEC   This may have changed:  when to take this   Used for:  GERD (gastroesophageal reflux disease)        Dose:  20 mg   Take 1 capsule by mouth 2 times daily.   Quantity:  180 capsule   Refills:  3         Stop taking these medicines if you haven't already. Please contact your care team if you have questions.     azithromycin 250 MG tablet    Commonly known as:  ZITHROMAX   Stopped by:  Jose Angel Leiva MD           beclomethasone 80 MCG/ACT Inhaler   Commonly known as:  QVAR   Stopped by:  Jose Angel Leiva MD           Beclomethasone Dipropionate 80 MCG/ACT Aers Nasal Spray   Commonly known as:  QNASL   Stopped by:  Jose Angel Leiva MD           fexofenadine 180 MG tablet   Commonly known as:  ALLEGRA   Stopped by:  Jose Angel Leiva MD           fluticasone 50 MCG/ACT spray   Commonly known as:  FLONASE   Stopped by:  Jose Angel Leiva MD           fluticasone-salmeterol 500-50 MCG/DOSE diskus inhaler   Commonly known as:  ADVAIR DISKUS   Stopped by:  Jose Angel Leiva MD                Where to get your medicines      These medications were sent to [facility] [address]  [address] [address]     Phone:  [phone]     amoxicillin-clavulanate 875-125 MG per tablet    fluticasone-vilanterol 200-25 MCG/INH oral inhaler                Primary Care Provider Office Phone # Fax #    Mary Lezama,  [phone]       [address] [address]        Equal Access to Services     DARY FERNANDEZ : Hadii dian portillo hadasho Soomaali, waaxda luqadaha, qaybta kaalmada adeegyada, gustavo valdez haylalo franks. So [facility] [phone].    ATENCIÓN: Si habla español, tiene a gates disposición servicios gratuitos de asistencia lingüística. [facility] [phone].    We comply with applicable federal civil rights laws and [place] laws. We do not discriminate on the basis of race, color, national origin, age, disability, sex, sexual orientation, or gender identity.            Thank you!     Thank you for choosing [facility]  for your care. Our goal is always to provide you with excellent care. Hearing back from our patients is one way we can continue to improve our services. Please take a few minutes to complete the written survey that you may receive in the mail after your  visit with us. Thank you!             Your Updated Medication List - Protect others around you: Learn how to safely use, store and throw away your medicines at www.disposemymeds.org.          This list is accurate as of 6/12/18 11:59 PM.  Always use your most recent med list.                   Brand Name Dispense Instructions for use Diagnosis    * albuterol (2.5 MG/3ML) 0.083% neb solution     30 vial    Take 1 vial (2.5 mg) by nebulization every 4 hours as needed for shortness of breath / dyspnea    Moderate persistent asthma, uncomplicated       * albuterol 108 (90 Base) MCG/ACT Inhaler    VENTOLIN HFA    1 Inhaler    Inhale 2 puffs into the lungs every 4 hours as needed Take for shortness of breath, cough or wheeze. Take before exercise.    Moderate persistent asthma, uncomplicated       amoxicillin-clavulanate 875-125 MG per tablet    AUGMENTIN    20 tablet    Take 1 tablet by mouth 2 times daily    Acute sinusitis with symptoms > 10 days       atorvastatin 10 MG tablet    LIPITOR    90 tablet    Take 1 tablet (10 mg) by mouth daily    Hyperlipidemia LDL goal <130       azelastine 0.1 % spray    ASTELIN    30 mL    Spray 1 spray into both nostrils 2 times daily    Other chronic rhinitis       flunisolide 25 MCG/ACT (0.025%) Soln spray    NASALIDE    1 Bottle    Spray 2 sprays into both nostrils 2 times daily    Chronic seasonal allergic rhinitis due to pollen       fluticasone 100 MCG/BLIST Aepb    FLOVENT DISKUS    60 Inhaler    Inhale 1 puff into the lungs every 12 hours    Not well controlled moderate persistent asthma       fluticasone-vilanterol 200-25 MCG/INH oral inhaler    BREO ELLIPTA    1 Inhaler    Inhale 1 puff into the lungs daily    Moderate persistent asthma, uncomplicated       ipratropium - albuterol 0.5 mg/2.5 mg/3 mL 0.5-2.5 (3) MG/3ML neb solution    DUONEB    30 vial    Take 1 vial (3 mLs) by nebulization every 6 hours as needed for shortness of breath / dyspnea or wheezing    Not well  controlled moderate persistent asthma       lisinopril-hydrochlorothiazide 10-12.5 MG per tablet    PRINZIDE/ZESTORETIC    90 tablet    Take 1 tablet by mouth daily    Essential hypertension       montelukast 10 MG tablet    SINGULAIR    90 tablet    Take 1 tablet (10 mg) by mouth At Bedtime    Moderate persistent asthma, uncomplicated       nebulizer/tubing/mouthpiece Kit     1 kit    use with nebulizer    Moderate persistent asthma, uncomplicated       omeprazole 20 MG CR capsule    priLOSEC    180 capsule    Take 1 capsule by mouth 2 times daily.    GERD (gastroesophageal reflux disease)       * Notice:  This list has 2 medication(s) that are the same as other medications prescribed for you. Read the directions carefully, and ask your doctor or other care provider to review them with you.

## 2018-06-12 NOTE — PROGRESS NOTES
SUBJECTIVE:                                                                 Markell Ramsey presents today to our Allergy Clinic at [facility]  for a follow up visit.    As you know, he is a 58-year-old male with moderate persistent asthma, allergic rhinitis, recurrent sinus infection/bronchitis and decreased IgM.     November 2017:Normal CH 50. Normal total IgE.  Normal total IgG.  Normal IgG subclasses.   Normal total IgA level. Decreased IgM level on multiple occasions. Normal T and B cells (flow cytometry).  20/22 protective pneumococcal antibody levels. Protective tetanus antibody level.     SPT in 2007 was positive to molds, tree pollen, grass mix, and ragweed. IDs were positive for cat, roaches,  dog, mite. Aspergillus mold, and marsh elder. Started by Dr. Whittington on allergen IT in 2007,  [facility] Whittington [facility] to practice before. He received allergen immunotherapy on and off until 2010, which he found partially helpful.        Sinus CT in 2013, with mild-moderate sinus disease, and DNS on the left. The patient confirms nasal congestion L>R.      IMPRESSION:  Mild/moderate mucosal thickening in the paranasal  sinuses as described above.  Slight worsening of the right maxillary  sinus and worsening of the right frontal sinus since the previous  exam, but clearing of the left sphenoid sinus.    He just came back from a cruise from [place], [place], and [place].    His rhinitis symptoms were pretty well controlled with flunisolide, azelastine and montelukast 10 mg by mouth daily.  He got sick on the cruise.  Developed sinus pressure, green rhinorrhea, postnasal drainage, nasal congestion.  Has been going on for the last 2 weeks.  He does not think that he is getting significantly better.    He also ran out of [place] while he was on the cruise.  He took some leftover self Qvar and thought it was somewhat helpful.  He still developed a cough, and he is not sure  if it is asthma related respiratory infection related.  He uses albuterol 2-3 times a week.  He continues taking montelukast 10 mg by mouth daily.  No interval steroid use.  No ED/PCP/urgent care/other specialist visits for asthma flare since the last visit.  The patient states that he was given 1 Advair inhaler in the pharmacy, but the pharmacy says that they gave him 3 month supply.  Now, he cannot get the refill.    Patient Active Problem List   Diagnosis     Gastroesophageal reflux disease without esophagitis     Non morbid obesity due to excess calories     CARDIOVASCULAR SCREENING; LDL GOAL LESS THAN 160     IgM deficiency (H)     Moderate persistent asthma, uncomplicated     Essential hypertension     Hyperlipidemia LDL goal <130     DNS (deviated nasal septum)     Other chronic rhinitis       Past Medical History:   Diagnosis Date     Acute bronchitis      Allergic rhinitis, cause unspecified 9/23/2008     Allergic rhinitis, unspecified allergic rhinitis type 9/23/2008     Problem list name updated by automated process. Provider to review     Esophageal reflux      Pneumonia 2/24/2013    MRSA, required hospitalization     Unspecified asthma(493.90)       Problem (# of Occurrences) Relation (Name,Age of Onset)    Allergies (1) Mother: asthma    CANCER (1) Father: lung    Respiratory (3) Sister: asthma, Son (Ludante): Upper Resp infections, Son (David): asthma        Past Surgical History:   Procedure Laterality Date     C APPENDECTOMY  08/2006     SURGICAL HISTORY OF -   2/23/06    L4-5 microdiscectomy     TONSILLECTOMY & ADENOIDECTOMY       Social History     Social History     Marital status:      Spouse name: N/A     Number of children: N/A     Years of education: N/A     Social History Main Topics     Smoking status: Never Smoker     Smokeless tobacco: Never Used     Alcohol use Yes      Comment: rare     Drug use: No     Sexual activity: Not Asked     Other Topics Concern     None     Social History  Narrative    November 3, 2017        ENVIRONMENTAL HISTORY: [place] The home is heated with a forced air and gas furnace. They does have central air conditioning. The patient's bedroom is furnished with hard naseem in bedroom, allergen mattress cover and allergen pillowcase cover. No pets inside the house. There is no history of cockroach or mice infestation. There are no smokers in the house.  The house does not have a damp basement.                Review of Systems   Constitutional: Negative for activity change, fatigue and fever.   HENT: Positive for congestion, postnasal drip, rhinorrhea, sinus pressure and sneezing. Negative for dental problem, ear pain, facial swelling and nosebleeds.    Eyes: Positive for itching. Negative for discharge and redness.   Respiratory: Positive for cough. Negative for chest tightness, shortness of breath and wheezing.    Cardiovascular: Negative for chest pain.   Gastrointestinal: Negative for diarrhea, nausea and vomiting.   Musculoskeletal: Negative for arthralgias, joint swelling and myalgias.   Skin: Negative for rash.   Neurological: Positive for headaches.   Hematological: Negative for adenopathy.   Psychiatric/Behavioral: Negative for behavioral problems and self-injury.           Current Outpatient Prescriptions:      albuterol (2.5 MG/3ML) 0.083% nebulizer solution, Take 1 vial (2.5 mg) by nebulization every 4 hours as needed for shortness of breath / dyspnea, Disp: 30 vial, Rfl: 11     albuterol (VENTOLIN HFA) 108 (90 BASE) MCG/ACT Inhaler, Inhale 2 puffs into the lungs every 4 hours as needed Take for shortness of breath, cough or wheeze. Take before exercise., Disp: 1 Inhaler, Rfl: 11     amoxicillin-clavulanate (AUGMENTIN) 875-125 MG per tablet, Take 1 tablet by mouth 2 times daily, Disp: 20 tablet, Rfl: 0     atorvastatin (LIPITOR) 10 MG tablet, Take 1 tablet (10 mg) by mouth daily, Disp: 90 tablet, Rfl: 3      azelastine (ASTELIN) 0.1 % spray, Spray 1 spray into both nostrils 2 times daily, Disp: 30 mL, Rfl: 3     flunisolide (NASALIDE) 25 MCG/ACT (0.025%) SOLN spray, Spray 2 sprays into both nostrils 2 times daily, Disp: 1 Bottle, Rfl: 0     fluticasone (FLOVENT DISKUS) 100 MCG/BLIST AEPB, Inhale 1 puff into the lungs every 12 hours, Disp: 60 Inhaler, Rfl: 3     fluticasone-vilanterol (BREO ELLIPTA) 200-25 MCG/INH oral inhaler, Inhale 1 puff into the lungs daily, Disp: 1 Inhaler, Rfl: 3     ipratropium - albuterol 0.5 mg/2.5 mg/3 mL (DUONEB) 0.5-2.5 (3) MG/3ML neb solution, Take 1 vial (3 mLs) by nebulization every 6 hours as needed for shortness of breath / dyspnea or wheezing, Disp: 30 vial, Rfl: 1     lisinopril-hydrochlorothiazide (PRINZIDE/ZESTORETIC) 10-12.5 MG per tablet, Take 1 tablet by mouth daily, Disp: 90 tablet, Rfl: 1     montelukast (SINGULAIR) 10 MG tablet, Take 1 tablet (10 mg) by mouth At Bedtime, Disp: 90 tablet, Rfl: 3     omeprazole (PRILOSEC) 20 MG capsule, Take 1 capsule by mouth 2 times daily. (Patient taking differently: Take 20 mg by mouth daily ), Disp: 180 capsule, Rfl: 3     Respiratory Therapy Supplies (NEBULIZER/TUBING/MOUTHPIECE) KIT, use with nebulizer, Disp: 1 kit, Rfl: 11  Immunization History   Administered Date(s) Administered     Influenza (H1N1) 01/11/2010     Influenza (IIV3) PF 10/29/2008, 08/26/2009, 10/05/2010     Influenza Vaccine IM 3yrs+ 4 Valent IIV4 10/23/2015, 09/21/2017     Pneumococcal 23 valent 01/29/2016     TDAP Vaccine (Adacel) 11/18/2008     No Known Allergies  OBJECTIVE:                                                                 /66 (BP Location: Left arm, Patient Position: Sitting, Cuff Size: Adult Regular)  Pulse 80  Temp 97.9  F (36.6  C) (Tympanic)  Resp 16  Wt 103.6 kg (228 lb 6.3 oz)  SpO2 94%  BMI 33.71 kg/m2        Physical Exam   Constitutional: No distress.   obese   HENT:   Head: Normocephalic and atraumatic.   Right Ear: Tympanic  membrane, external ear and ear canal normal.   Left Ear: Tympanic membrane, external ear and ear canal normal.   Nose: Rhinorrhea (mucoid) and septal deviation (Septal spur on the left noted.) present. No mucosal edema.   Mouth/Throat: Oropharynx is clear and moist and mucous membranes are normal. No oropharyngeal exudate, posterior oropharyngeal edema or posterior oropharyngeal erythema.   Eyes: Conjunctivae are normal. Right eye exhibits no discharge. Left eye exhibits no discharge.   Neck: Normal range of motion.   Cardiovascular: Normal rate, regular rhythm and normal heart sounds.    No murmur heard.  Pulmonary/Chest: Effort normal. No respiratory distress. He has no wheezes. He has no rales.        Musculoskeletal: Normal range of motion.   Neurological: He is alert.   Skin: Skin is warm. He is not diaphoretic.   Psychiatric: Affect normal.   Nursing note and vitals reviewed.    WORKUP:   SPIROMETRY       FVC 4.28L (92% of predicted).     FEV1 3.52L (97% of predicted).     FEV1/FVC 82%     FEF 25%-75%  3.78L/s (120% of predicted)    The office spirometry performed today doesn't suggest an obstruction.      Asthma Control Test (ACT) total score: 19       ASSESSMENT/PLAN:    Problem List Items Addressed This Visit        Respiratory    1. Moderate persistent asthma, uncomplicated - Primary (Chronic)  To help him with Advair situation, I recommend not to continue inhaled Qvar, but start Breo Ellipta 200/25 mcg 1 puff once daily.  He is open to try a stepdown therapy, and I think it is reasonable to do once he is better.  -Continue using albuterol inhaler 2-4 puffs every 4-6 hours as needed for chest tightness/wheezing/shortness of breath/persistent cough.  -Use it with chamber device.    Relevant Medications    fluticasone-vilanterol (BREO ELLIPTA) 200-25 MCG/INH oral inhaler    Other Relevant Orders    Spirometry, Breathing Capacity (Completed)    OPTICHAMBER (Completed)    2. Other chronic rhinitis  Currently,  suboptimally controlled due to recently developed sinusitis.  However, he was doing well on on a combination of intranasal flunisolide, azelastine, and montelukast 10 mg by mouth daily.  -Continue as is.    Other Visit Diagnoses           3. Acute sinusitis with symptoms > 10 days        Relevant Medications    amoxicillin-clavulanate (AUGMENTIN) 875-125 MG per tablet     Follow up in 4/6 weeks or sooner if needed.     Thank you for allowing us to participate in the care of this patient. Please feel free to contact us if there are any questions or concerns about the patient.    Disclaimer: This note consists of symbols derived from keyboarding, dictation and/or voice recognition software. As a result, there may be errors in the script that have gone undetected. Please consider this when interpreting information found in this chart.    Jose Angel Leiva MD, FACI  Allergy, Asthma and Immunology  [place], [place] and [facility]

## 2018-06-13 ASSESSMENT — ASTHMA QUESTIONNAIRES: ACT_TOTALSCORE: 19

## 2018-07-11 DIAGNOSIS — J45.40 NOT WELL CONTROLLED MODERATE PERSISTENT ASTHMA: ICD-10-CM

## 2018-07-11 NOTE — TELEPHONE ENCOUNTER
"Requested Prescriptions   Pending Prescriptions Disp Refills     ADVAIR DISKUS 500-50 MCG/DOSE diskus inhaler [Pharmacy Med Name: ADVAIR 500-50 DISKUS]  Last Written Prescription Date:  09/10/2009 (DC'ed on 10/05/2010)  Last Fill Quantity: 1,  # refills: 11   Last office visit: 9/21/2017 with prescribing provider:  nidhi   Future Office Visit:   Next 5 appointments (look out 90 days)     Jul 23, 2018  8:40 AM CDT   [facility] Jose Angel [facility]iva MD   [facility]    [address] [address]   [phone]                   0     Sig: INHALE 1 PUFF BY MOUTH TWICE DAILY    Inhaled Steroids Protocol Failed    7/11/2018  4:17 AM       Failed - Asthma control assessment score within normal limits in last 6 months    Please review ACT score.          Failed - Recent (6 mo) or future (30 days) visit within the authorizing provider's specialty    Patient had office visit in the last 6 months or has a visit in the next 30 days with authorizing provider or within the authorizing provider's specialty.  See \"Patient Info\" tab in inbasket, or \"Choose Columns\" in Meds & Orders section of the refill encounter.           Passed - Patient is age 12 or older        Tacos SOLITARIO (R)    "

## 2018-07-12 NOTE — TELEPHONE ENCOUNTER
Medication is being filled for 1 time refill only due to:  Last allergy visit 6/12/18; has f/u appt 7/23/18    Called and spoke with pt, he may have accidentally ordered Advair somehow,  but he is wanting Flovent refilled.    Wendy HARRINGTON RN

## 2018-07-13 ENCOUNTER — TELEPHONE (OUTPATIENT)
Dept: ALLERGY | Facility: CLINIC | Age: 58
End: 2018-07-13

## 2018-07-13 DIAGNOSIS — J45.40 MODERATE PERSISTENT ASTHMA, UNCOMPLICATED: Primary | Chronic | ICD-10-CM

## 2018-07-13 DIAGNOSIS — J45.40 NOT WELL CONTROLLED MODERATE PERSISTENT ASTHMA: ICD-10-CM

## 2018-07-13 DIAGNOSIS — J30.1 CHRONIC SEASONAL ALLERGIC RHINITIS DUE TO POLLEN: ICD-10-CM

## 2018-07-13 RX ORDER — FLUNISOLIDE 0.25 MG/ML
2 SOLUTION NASAL 2 TIMES DAILY
Qty: 1 BOTTLE | Refills: 3 | Status: SHIPPED | OUTPATIENT
Start: 2018-07-13 | End: 2019-03-01

## 2018-07-13 NOTE — TELEPHONE ENCOUNTER
Called and spoke with pt regarding note below. States that his Flovent is  and he would need it for the yellow zone of his asthma action plan. Discussed with Dr Leiva and will prescribe QVAR Redihaler. Discussed how to use with pt. Verbalizes understanding. Pt would like it sent to [facility] in [place].     Pt has been using Breo daily with positive results and no complaints.     Delphine LU RN

## 2018-07-13 NOTE — TELEPHONE ENCOUNTER
The patient is currently on Breo   200/25 mcg 1 puff once daily.  I am not sure  Why the patient is on Flovent.  I was not the one who prescribed it.    Jose Angel Leiva

## 2018-07-13 NOTE — TELEPHONE ENCOUNTER
Pending Prescriptions:                       Disp   Refills    flunisolide (NASALIDE) 25 MCG/ACT (0.025%*1 Vin*0            Sig: Spray 2 sprays into both nostrils 2 times daily    Last OV with prescribing physician: 6/12/18

## 2018-07-13 NOTE — TELEPHONE ENCOUNTER
Message from Pharmacy: Alternative requested: Insurance does not cover Flovent. Please send RX for Asmanex or Qvar Redihaler.    Suggested alternatives: Asmanex HFA 100mcg, Qvar Redihaler 40mcg, Asmanex Twisthaler 220mcg #30

## 2018-07-23 ENCOUNTER — OFFICE VISIT (OUTPATIENT)
Dept: ALLERGY | Facility: CLINIC | Age: 58
End: 2018-07-23
Payer: COMMERCIAL

## 2018-07-23 VITALS
OXYGEN SATURATION: 97 % | TEMPERATURE: 97.1 F | SYSTOLIC BLOOD PRESSURE: 124 MMHG | HEART RATE: 80 BPM | DIASTOLIC BLOOD PRESSURE: 70 MMHG | BODY MASS INDEX: 34.3 KG/M2 | RESPIRATION RATE: 16 BRPM | WEIGHT: 232.37 LBS

## 2018-07-23 DIAGNOSIS — R04.0 EPISTAXIS: ICD-10-CM

## 2018-07-23 DIAGNOSIS — J31.0 OTHER CHRONIC RHINITIS: ICD-10-CM

## 2018-07-23 DIAGNOSIS — J45.40 MODERATE PERSISTENT ASTHMA, UNCOMPLICATED: Primary | Chronic | ICD-10-CM

## 2018-07-23 LAB
FEF 25/75: NORMAL
FEV-1: NORMAL
FEV1/FVC: NORMAL
FVC: NORMAL

## 2018-07-23 PROCEDURE — 94010 BREATHING CAPACITY TEST: CPT | Performed by: ALLERGY & IMMUNOLOGY

## 2018-07-23 PROCEDURE — 99214 OFFICE O/P EST MOD 30 MIN: CPT | Mod: 25 | Performed by: ALLERGY & IMMUNOLOGY

## 2018-07-23 RX ORDER — MONTELUKAST SODIUM 10 MG/1
10 TABLET ORAL AT BEDTIME
Qty: 90 TABLET | Refills: 3 | Status: SHIPPED | OUTPATIENT
Start: 2018-07-23 | End: 2019-10-21

## 2018-07-23 ASSESSMENT — ENCOUNTER SYMPTOMS
ACTIVITY CHANGE: 0
EYE DISCHARGE: 0
SHORTNESS OF BREATH: 0
FACIAL SWELLING: 0
JOINT SWELLING: 0
CHEST TIGHTNESS: 0
RHINORRHEA: 0
WHEEZING: 0
ADENOPATHY: 0
ARTHRALGIAS: 0
EYE ITCHING: 0
NAUSEA: 0
DIARRHEA: 0
COUGH: 0
EYE REDNESS: 0
MYALGIAS: 0
FATIGUE: 0
VOMITING: 0
HEADACHES: 0
FEVER: 0
SINUS PRESSURE: 0

## 2018-07-23 NOTE — LETTER
My Asthma Action Plan  Name: Markell Ramsey   YOB: 1960  Date: 7/23/2018  My doctor: Jose Angel Leiva MD   My clinic: [facility]        My Control Medicine: Fluticasone furoate + vilanterol (Breo Ellipta)-  100/25mcg 1 puff once daily AND  Montelukast (Singulair) -  10 mg by mouth once a day  My Rescue Medicine: Albuterol nebulizer solution every 4 hrs as needed  Albuterol (Proair/Ventolin/Proventil) inhaler 2-4 puffs every 4 hrs as needed   My Asthma Severity: moderate persistent  Avoid your asthma triggers: upper respiratory infections               GREEN ZONE   Good Control    I feel good    No cough or wheeze    Can work, sleep and play without asthma symptoms       Take your asthma control medicine every day.     1. If exercise triggers your asthma, take your rescue medication    15 minutes before exercise or sports, and    During exercise if you have asthma symptoms  2. Spacer to use with inhaler: If you have a spacer, make sure to use it with your inhaler             YELLOW ZONE Getting Worse  I have ANY of these:    I do not feel good    Cough or wheeze    Chest feels tight    Wake up at night   1. Keep taking your Green Zone medications, and Start Flovent 100 mcg 1 puff three times daily.   2. Start taking your rescue medicine:    every 20 minutes for up to 1 hour. Then every 4 hours for 24-48 hours.  3. If you stay in the Yellow Zone for more than 48  hours, contact your doctor.             RED ZONE Medical Alert - Get Help  I have ANY of these:    I feel awful    Medicine is not helping    Breathing getting harder    Trouble walking or talking    Nose opens wide to breathe       1. Take your rescue medicine NOW  2. If your provider has prescribed an oral steroid medicine, start taking it NOW  3. Call your doctor NOW  4. If you are still in the Red Zone after 20 minutes and you have not reached your doctor:    Take your rescue medicine again and    Call 911 or go to the emergency room  right away    See your regular doctor within 2 weeks of an Emergency Room or Urgent Care visit for follow-up treatment.        Electronically signed by: Jose Angel Leiva, 7/23/2018          Annual Reminders:  Meet with Asthma Educator,  Flu Shot in the Fall, consider Pneumonia Vaccination for patients with asthma (aged 19 and older).    Pharmacy:    [id number] - [facility] [address] AT [facility]  [facility] - [facility] [address] AT [place]  [id number] - [place], [place] - [address]                    Asthma Triggers  How To Control Things That Make Your Asthma Worse    Triggers are things that make your asthma worse.  Look at the list below to help you find your triggers and what you can do about them.  You can help prevent asthma flare-ups by staying away from your triggers.      Trigger                                                          What you can do   Cigarette Smoke  Tobacco smoke can make asthma worse. Do not allow smoking in your home, car or around you.  Be sure no one smokes at a child s day care or school.  If you smoke, ask your health care provider for ways to help you quit.  Ask family members to quit too.  Ask your health care provider for a referral to Quit Plan to help you quit smoking, or call 5-497-809-PLAN.     Colds, Flu, Bronchitis  These are common triggers of asthma. Wash your hands often.  Don t touch your eyes, nose or mouth.  Get a flu shot every year.     Dust Mites  These are tiny bugs that live in cloth or carpet. They are too small to see. Wash sheets and blankets in hot water every week.   Encase pillows and mattress in dust mite proof covers.  Avoid having carpet if you can. If you have carpet, vacuum weekly.   Use a dust mask and HEPA vacuum.   Pollen and Outdoor Mold  Some people are allergic to trees, grass, or weed pollen, or molds. Try to keep your  windows closed.  Limit time out doors when pollen count is high.   Ask you health care provider about taking medicine during allergy season.     Animal Dander  Some people are allergic to skin flakes, urine or saliva from pets with fur or feathers. Keep pets with fur or feathers out of your home.    If you can t keep the pet outdoors, then keep the pet out of your bedroom.  Keep the bedroom door closed.  Keep pets off cloth furniture and away from stuffed toys.     Mice, Rats, and Cockroaches  Some people are allergic to the waste from these pests.   Cover food and garbage.  Clean up spills and food crumbs.  Store grease in the refrigerator.   Keep food out of the bedroom.   Indoor Mold  This can be a trigger if your home has high moisture. Fix leaking faucets, pipes, or other sources of water.   Clean moldy surfaces.  Dehumidify basement if it is damp and smelly.   Smoke, Strong Odors, and Sprays  These can reduce air quality. Stay away from strong odors and sprays, such as perfume, powder, hair spray, paints, smoke incense, paint, cleaning products, candles and new carpet.   Exercise or Sports  Some people with asthma have this trigger. Be active!  Ask your doctor about taking medicine before sports or exercise to prevent symptoms.    Warm up for 5-10 minutes before and after sports or exercise.     Other Triggers of Asthma  Cold air:  Cover your nose and mouth with a scarf.  Sometimes laughing or crying can be a trigger.  Some medicines and food can trigger asthma.

## 2018-07-23 NOTE — PROGRESS NOTES
SUBJECTIVE:                                                               Markell Ramsey presents today to our Allergy Clinic at [facility]  for a follow up visit.    As you know, he is a 58-year-old male with moderate persistent asthma, allergic rhinitis, recurrent sinus infection/bronchitis and decreased IgM.      November 2017:Normal CH 50. Normal total IgE.  Normal total IgG.  Normal IgG subclasses.   Normal total IgA level. Decreased IgM level on multiple occasions. Normal T and B cells (flow cytometry).  20/22 protective pneumococcal antibody levels. Protective tetanus antibody level.      SPT in 2007 was positive to molds, tree pollen, grass mix, and ragweed. IDs were positive for cat, roaches,  dog, mite. Aspergillus mold, and marsh elder. Started by Dr. Whittington on allergen IT in 2007,  [facility] Whittington used to practice before. He received allergen immunotherapy on and off until 2010, which he found partially helpful.       Sinus CT in 2013, with mild-moderate sinus disease, and DNS on the left. The patient confirms nasal congestion L>R.      IMPRESSION:  Mild/moderate mucosal thickening in the paranasal  sinuses as described above.  Slight worsening of the right maxillary  sinus and worsening of the right frontal sinus since the previous  exam, but clearing of the left sphenoid sinus.    His rhinitis symptoms were pretty well controlled with flunisolide 1-2 sprays twice daily, azelastine as needed and montelukast 10 mg by mouth daily.   Yesterday he had a nasal bleed that lasted 40 minutes. No trauma. He has this problem couple of times a month.   Otherwise, has occasional nasal drip.   The patient denies clear rhinorrhea, nasal itch, stuffiness, sneezing or interval sinusitis symptoms of fever, facial pain or purulent rhinorrhea.    Regarding his asthma, he has been  7/7 days compliant with his controller medications (breo 200/25 mcg 1 puff once daily and  montelukast 10 mg by mouth daily). Markell  is using albuterol HFA  less than twice per week for rescue from chest symptoms. He is waking up less than twice per month due to chest symptoms.  There has been no use of oral steroids since last visit. No ED/PCP/urgent care/other specialist visits for asthma flare since the last visit. The patient denies current chest tightness, cough, wheeze or SOB.      Patient Active Problem List   Diagnosis     Gastroesophageal reflux disease without esophagitis     Non morbid obesity due to excess calories     CARDIOVASCULAR SCREENING; LDL GOAL LESS THAN 160     IgM deficiency (H)     Moderate persistent asthma, uncomplicated     Essential hypertension     Hyperlipidemia LDL goal <130     DNS (deviated nasal septum)     Other chronic rhinitis       Past Medical History:   Diagnosis Date     Acute bronchitis      Allergic rhinitis, cause unspecified 9/23/2008     Allergic rhinitis, unspecified allergic rhinitis type 9/23/2008     Problem list name updated by automated process. Provider to review     Esophageal reflux      Pneumonia 2/24/2013    MRSA, required hospitalization     Unspecified asthma(493.90)       Problem (# of Occurrences) Relation (Name,Age of Onset)    Allergies (1) Mother: asthma    Cancer (1) Father: lung    Respiratory (3) Sister: asthma, Son (Wisam): Upper Resp infections, Son (David): asthma        Past Surgical History:   Procedure Laterality Date     C APPENDECTOMY  08/2006     SURGICAL HISTORY OF -   2/23/06    L4-5 microdiscectomy     TONSILLECTOMY & ADENOIDECTOMY       Social History     Social History     Marital status:      Spouse name: N/A     Number of children: N/A     Years of education: N/A     Social History Main Topics     Smoking status: Never Smoker     Smokeless tobacco: Never Used     Alcohol use Yes      Comment: rare     Drug use: No     Sexual activity: Not Asked     Other Topics Concern     None     Social History Narrative    November  3, 2017        ENVIRONMENTAL HISTORY: [place] The home is heated with a forced air and gas furnace. They does have central air conditioning. The patient's bedroom is furnished with hard naseem in bedroom, allergen mattress cover and allergen pillowcase cover. No pets inside the house. There is no history of cockroach or mice infestation. There are no smokers in the house.  The house does not have a damp basement.                Review of Systems   Constitutional: Negative for activity change, fatigue and fever.   HENT: Positive for nosebleeds and postnasal drip. Negative for congestion, dental problem, ear pain, facial swelling, rhinorrhea, sinus pressure and sneezing.    Eyes: Negative for discharge, redness and itching.   Respiratory: Negative for cough, chest tightness, shortness of breath and wheezing.    Cardiovascular: Negative for chest pain.   Gastrointestinal: Negative for diarrhea, nausea and vomiting.   Musculoskeletal: Negative for arthralgias, joint swelling and myalgias.   Skin: Negative for rash.   Neurological: Negative for headaches.   Hematological: Negative for adenopathy.   Psychiatric/Behavioral: Negative for behavioral problems and self-injury.           Current Outpatient Prescriptions:      albuterol (2.5 MG/3ML) 0.083% nebulizer solution, Take 1 vial (2.5 mg) by nebulization every 4 hours as needed for shortness of breath / dyspnea, Disp: 30 vial, Rfl: 11     albuterol (VENTOLIN HFA) 108 (90 BASE) MCG/ACT Inhaler, Inhale 2 puffs into the lungs every 4 hours as needed Take for shortness of breath, cough or wheeze. Take before exercise., Disp: 1 Inhaler, Rfl: 11     atorvastatin (LIPITOR) 10 MG tablet, TAKE 1 TABLET BY MOUTH DAILY, Disp: 90 tablet, Rfl: 0     azelastine (ASTELIN) 0.1 % spray, Spray 1 spray into both nostrils 2 times daily, Disp: 30 mL, Rfl: 3     flunisolide (NASALIDE) 25 MCG/ACT (0.025%) SOLN spray, Spray 2 sprays into both  nostrils 2 times daily, Disp: 1 Bottle, Rfl: 3     fluticasone-vilanterol (BREO ELLIPTA) 100-25 MCG/INH oral inhaler, Inhale 1 puff into the lungs daily, Disp: 1 Inhaler, Rfl: 3     fluticasone-vilanterol (BREO ELLIPTA) 200-25 MCG/INH oral inhaler, Inhale 1 puff into the lungs daily, Disp: 1 Inhaler, Rfl: 3     ipratropium - albuterol 0.5 mg/2.5 mg/3 mL (DUONEB) 0.5-2.5 (3) MG/3ML neb solution, Take 1 vial (3 mLs) by nebulization every 6 hours as needed for shortness of breath / dyspnea or wheezing, Disp: 30 vial, Rfl: 1     lisinopril-hydrochlorothiazide (PRINZIDE/ZESTORETIC) 10-12.5 MG per tablet, Take 1 tablet by mouth daily, Disp: 90 tablet, Rfl: 1     montelukast (SINGULAIR) 10 MG tablet, Take 1 tablet (10 mg) by mouth At Bedtime, Disp: 90 tablet, Rfl: 3     omeprazole (PRILOSEC) 20 MG capsule, Take 1 capsule by mouth 2 times daily. (Patient taking differently: Take 20 mg by mouth daily ), Disp: 180 capsule, Rfl: 3     Respiratory Therapy Supplies (NEBULIZER/TUBING/MOUTHPIECE) KIT, use with nebulizer, Disp: 1 kit, Rfl: 11     beclomethasone HFA (QVAR REDIHALER) 80 MCG/ACT AERB inhaler, Inhale 2 puffs into the lungs 3 times daily In the yellow and red zones only of asthma action plan (Patient not taking: Reported on 7/23/2018), Disp: 1 Inhaler, Rfl: 3     [DISCONTINUED] montelukast (SINGULAIR) 10 MG tablet, Take 1 tablet (10 mg) by mouth At Bedtime, Disp: 90 tablet, Rfl: 3  Immunization History   Administered Date(s) Administered     Influenza (H1N1) 01/11/2010     Influenza (IIV3) PF 10/29/2008, 08/26/2009, 10/05/2010     Influenza Vaccine IM 3yrs+ 4 Valent IIV4 10/23/2015, 09/21/2017     Pneumococcal 23 valent 01/29/2016     TDAP Vaccine (Adacel) 11/18/2008     No Known Allergies  OBJECTIVE:                                                                 /70 (BP Location: Left arm, Patient Position: Sitting, Cuff Size: Adult Large)  Pulse 80  Temp 97.1  F (36.2  C) (Oral)  Resp 16  Wt 105.4 kg (232  lb 5.8 oz)  SpO2 97%  BMI 34.3 kg/m2        Physical Exam   Constitutional: No distress.   obese   HENT:   Head: Normocephalic and atraumatic.   Right Ear: Tympanic membrane, external ear and ear canal normal.   Left Ear: Tympanic membrane, external ear and ear canal normal.   Nose: Rhinorrhea (mucoid) and septal deviation (Septal spur on the left noted.) present. No mucosal edema.   Mouth/Throat: Oropharynx is clear and moist and mucous membranes are normal. No oropharyngeal exudate, posterior oropharyngeal edema or posterior oropharyngeal erythema.   Superficial scab on the left side of the septum   Eyes: Conjunctivae are normal. Right eye exhibits no discharge. Left eye exhibits no discharge.   Neck: Normal range of motion.   Cardiovascular: Normal rate, regular rhythm and normal heart sounds.    No murmur heard.  Pulmonary/Chest: Effort normal. No respiratory distress. He has no wheezes. He has no rales.        Musculoskeletal: Normal range of motion.   Neurological: He is alert.   Skin: Skin is warm. He is not diaphoretic.   Psychiatric: Affect normal.   Nursing note and vitals reviewed.      WORKUP:   SPIROMETRY       FVC 4.08L (88% of predicted).     FEV1 3.30L (91% of predicted).     FEV1/FVC 81%     FEF 25%-75%  2.94L/s (93% of predicted).    Asthma Control Test (ACT) total score: 25     ASSESSMENT/PLAN:     Problem List Items Addressed This Visit        Respiratory    1. Moderate persistent asthma, uncomplicated - Primary (Chronic)  Currently well controlled with Breo Ellipta 200/25 mcg 1 puff once daily, montelukast 10 mg by mouth once daily and albuterol inhaler on as needed basis.  -Stepdown to Breo Ellipta 100/25 mcg 1 puff once daily.  Continue montelukast and albuterol as is.  Will follow up in 6 weeks to monitor symptom control.    Relevant Medications    fluticasone-vilanterol (BREO ELLIPTA) 100-25 MCG/INH oral inhaler    montelukast (SINGULAIR) 10 MG tablet    Other Relevant Orders     Spirometry, Breathing Capacity (Completed)    2. Other chronic rhinitis  Currently well controlled with flunisolide, azelastine, and montelukast.  -Continue as is.    Relevant Medications        montelukast (SINGULAIR) 10 MG tablet      Other Visit Diagnoses     3. Epistaxis      --Point the tip of the nasal sprays to the same side eye.  If he continues having it, consider ENT evaluation, and recommend PCP to evaluate for bleeding disorder.          Follow up in 6 weeks or sooner if needed.    Thank you for allowing us to participate in the care of this patient. Please feel free to contact us if there are any questions or concerns about the patient.    Disclaimer: This note consists of symbols derived from keyboarding, dictation and/or voice recognition software. As a result, there may be errors in the script that have gone undetected. Please consider this when interpreting information found in this chart.    Jose Angel Leiva MD, FACAAI  Allergy, Asthma and Immunology  [place], [place] and [place]

## 2018-07-23 NOTE — MR AVS SNAPSHOT
After Visit Summary   7/23/2018    Markell Ramsey    MRN: [id number]           Patient Information     Date Of Birth          1960        Visit Information        Provider Department      7/23/2018 8:40 AM Jose Angel Leiva MD [facility]        Today's Diagnoses     Moderate persistent asthma, uncomplicated    -  1    Other chronic rhinitis           Follow-ups after your visit        Your next 10 appointments already scheduled     Sep 06, 2018  8:40 AM CDT   [facility] Visit with Jose Angel Leiva MD   [facility]    [address] [address] [phone]              Who to contact     If you have questions or need follow up information about today's clinic visit or your schedule please contact [facility] directly at [phone].  Normal or non-critical lab and imaging results will be communicated to you by MyChart, letter or phone within 4 business days after the clinic has received the results. If you do not hear from us within 7 days, please contact the clinic through MyChart or phone. If you have a critical or abnormal lab result, we will notify you by phone as soon as possible.  Submit refill requests through [employer] or call your pharmacy and they will forward the refill request to us. Please allow 3 business days for your refill to be completed.          Additional Information About Your Visit        MyChart Information     [employer] gives you secure access to your electronic health record. If you see a primary care provider, you can also send messages to your care team and make appointments. If you have questions, please call your primary care clinic.  If you do not have a primary care provider, please call [phone] and they will assist you.        Care EveryWhere ID     This is your Care EveryWhere ID. This could be used by other organizations to access your [place] medical records  [id number]        Your  Vitals Were     Pulse Temperature Respirations Pulse Oximetry BMI (Body Mass Index)       80 97.1  F (36.2  C) (Oral) 16 97% 34.3 kg/m2        Blood Pressure from Last 3 Encounters:   07/23/18 124/70   06/12/18 131/66   01/08/18 139/78    Weight from Last 3 Encounters:   07/23/18 105.4 kg (232 lb 5.8 oz)   06/12/18 103.6 kg (228 lb 6.3 oz)   01/08/18 101.1 kg (222 lb 14.2 oz)              We Performed the Following     Spirometry, Breathing Capacity          Today's Medication Changes          These changes are accurate as of 7/23/18  9:23 AM.  If you have any questions, ask your nurse or doctor.               These medicines have changed or have updated prescriptions.        Dose/Directions    * fluticasone-vilanterol 200-25 MCG/INH oral inhaler   Commonly known as:  BREO ELLIPTA   This may have changed:  Another medication with the same name was added. Make sure you understand how and when to take each.   Used for:  Moderate persistent asthma, uncomplicated   Changed by:  Jose Angel Leiva MD        Dose:  1 puff   Inhale 1 puff into the lungs daily   Quantity:  1 Inhaler   Refills:  3       * fluticasone-vilanterol 100-25 MCG/INH oral inhaler   Commonly known as:  BREO ELLIPTA   This may have changed:  You were already taking a medication with the same name, and this prescription was added. Make sure you understand how and when to take each.   Used for:  Moderate persistent asthma, uncomplicated   Changed by:  Jose Angel Leiva MD        Dose:  1 puff   Inhale 1 puff into the lungs daily   Quantity:  1 Inhaler   Refills:  3       omeprazole 20 MG CR capsule   Commonly known as:  priLOSEC   This may have changed:  when to take this   Used for:  GERD (gastroesophageal reflux disease)        Dose:  20 mg   Take 1 capsule by mouth 2 times daily.   Quantity:  180 capsule   Refills:  3       * Notice:  This list has 2 medication(s) that are the same as other medications prescribed for you. Read the directions carefully,  and ask your doctor or other care provider to review them with you.      Stop taking these medicines if you haven't already. Please contact your care team if you have questions.     fluticasone 100 MCG/BLIST Aepb   Commonly known as:  FLOVENT DISKUS   Stopped by:  Jose Angel Leiva MD                Where to get your medicines      These medications were sent to [place] [address] [place] [facility] [place] [address] [facility] [address]     Phone:  [phone]     fluticasone-vilanterol 100-25 MCG/INH oral inhaler    montelukast 10 MG tablet                Primary Care Provider Office Phone # Fax #    Mary Lezama,  [phone] [address] [facility] [address]        Equal Access to Services     DARY FERNANDEZ : Hadii aad ku hadasho Sojuanito, waaxda luqadaha, qaybta kaalmada adeegyada, gustavo ervin . So [facility] [phone].    ATENCIÓN: Si habla español, tiene a gates disposición servicios gratuitos de asistencia lingüística. Llame al [phone].    We comply with applicable federal civil rights laws and [place] laws. We do not discriminate on the basis of race, color, national origin, age, disability, sex, sexual orientation, or gender identity.            Thank you!     Thank you for choosing [facility]  for your care. Our goal is always to provide you with excellent care. Hearing back from our patients is one way we can continue to improve our services. Please take a few minutes to complete the written survey that you may receive in the mail after your visit with us. Thank you!             Your Updated Medication List - Protect others around you: Learn how to safely use, store and throw away your medicines at www.disposemymeds.org.          This list is accurate as of 7/23/18  9:23 AM.  Always use your most recent med list.                   Brand Name Dispense Instructions for use Diagnosis    * albuterol (2.5 MG/3ML)  0.083% neb solution     30 vial    Take 1 vial (2.5 mg) by nebulization every 4 hours as needed for shortness of breath / dyspnea    Moderate persistent asthma, uncomplicated       * albuterol 108 (90 Base) MCG/ACT Inhaler    VENTOLIN HFA    1 Inhaler    Inhale 2 puffs into the lungs every 4 hours as needed Take for shortness of breath, cough or wheeze. Take before exercise.    Moderate persistent asthma, uncomplicated       atorvastatin 10 MG tablet    LIPITOR    90 tablet    TAKE 1 TABLET BY MOUTH DAILY    Hyperlipidemia LDL goal <130       azelastine 0.1 % spray    ASTELIN    30 mL    Spray 1 spray into both nostrils 2 times daily    Other chronic rhinitis       beclomethasone HFA 80 MCG/ACT Aerb inhaler    QVAR REDIHALER    1 Inhaler    Inhale 2 puffs into the lungs 3 times daily In the yellow and red zones only of asthma action plan    Moderate persistent asthma, uncomplicated       flunisolide 25 MCG/ACT (0.025%) Soln spray    NASALIDE    1 Bottle    Spray 2 sprays into both nostrils 2 times daily    Chronic seasonal allergic rhinitis due to pollen       * fluticasone-vilanterol 200-25 MCG/INH oral inhaler    BREO ELLIPTA    1 Inhaler    Inhale 1 puff into the lungs daily    Moderate persistent asthma, uncomplicated       * fluticasone-vilanterol 100-25 MCG/INH oral inhaler    BREO ELLIPTA    1 Inhaler    Inhale 1 puff into the lungs daily    Moderate persistent asthma, uncomplicated       ipratropium - albuterol 0.5 mg/2.5 mg/3 mL 0.5-2.5 (3) MG/3ML neb solution    DUONEB    30 vial    Take 1 vial (3 mLs) by nebulization every 6 hours as needed for shortness of breath / dyspnea or wheezing    Not well controlled moderate persistent asthma       lisinopril-hydrochlorothiazide 10-12.5 MG per tablet    PRINZIDE/ZESTORETIC    90 tablet    Take 1 tablet by mouth daily    Essential hypertension       montelukast 10 MG tablet    SINGULAIR    90 tablet    Take 1 tablet (10 mg) by mouth At Bedtime    Moderate  persistent asthma, uncomplicated       nebulizer/tubing/mouthpiece Kit     1 kit    use with nebulizer    Moderate persistent asthma, uncomplicated       omeprazole 20 MG CR capsule    priLOSEC    180 capsule    Take 1 capsule by mouth 2 times daily.    GERD (gastroesophageal reflux disease)       * Notice:  This list has 4 medication(s) that are the same as other medications prescribed for you. Read the directions carefully, and ask your doctor or other care provider to review them with you.

## 2018-07-24 ASSESSMENT — ASTHMA QUESTIONNAIRES: ACT_TOTALSCORE: 25

## 2018-08-15 ENCOUNTER — TELEPHONE (OUTPATIENT)
Dept: ALLERGY | Facility: CLINIC | Age: 58
End: 2018-08-15

## 2018-08-15 DIAGNOSIS — J45.40 MODERATE PERSISTENT ASTHMA, UNCOMPLICATED: Chronic | ICD-10-CM

## 2018-08-15 NOTE — TELEPHONE ENCOUNTER
When I prescribed Breo first time, preferably pharmacy, the price was $10 if coupon was applied.  The second time, the patient requested medicine to be sent to [facility].  I think they have not applied the coupon and that is the reason why the price is so high.  Please follow-up with the pharmacy.  Jose Angel Leiva

## 2018-08-15 NOTE — TELEPHONE ENCOUNTER
Reason for Call:  Other prescription    Detailed comments: pt calling stating he was prescribed Breo but is cost $300, would like to switch back to the Advair. Please call pt to let him know if this is an option.     Phone Number Patient can be reached at: Home number on file [phone] (home)    Best Time: any     Can we leave a detailed message on this number? YES    Call taken on 8/15/2018 at 9:04 AM by Karoline Sosa

## 2018-08-15 NOTE — TELEPHONE ENCOUNTER
Rx for Breo 100 sent to [facility] pharmacy.  The cost is 10 dollars with the coupon applied.  Patient states he will pick this up.  No further questions or concerns.  Kristin Petty RN

## 2018-08-15 NOTE — TELEPHONE ENCOUNTER
"Last OV 7/23/18.  Please see note below regarding patient's controller inhaler.    Per last OV note: \"Currently well controlled with Breo Ellipta 200/25 mcg 1 puff once daily, montelukast 10 mg by mouth once daily and albuterol inhaler on as needed basis.  -Stepdown to Breo Ellipta 100/25 mcg 1 puff once daily.  Continue montelukast and albuterol as is.  Will follow up in 6 weeks to monitor symptom control\"    Please advise regarding Advair request.  Kristin Petty RN    "

## 2018-09-06 ENCOUNTER — OFFICE VISIT (OUTPATIENT)
Dept: ALLERGY | Facility: CLINIC | Age: 58
End: 2018-09-06
Payer: COMMERCIAL

## 2018-09-06 VITALS
TEMPERATURE: 96.8 F | OXYGEN SATURATION: 97 % | RESPIRATION RATE: 16 BRPM | WEIGHT: 227.51 LBS | BODY MASS INDEX: 33.7 KG/M2 | DIASTOLIC BLOOD PRESSURE: 73 MMHG | SYSTOLIC BLOOD PRESSURE: 117 MMHG | HEIGHT: 69 IN | HEART RATE: 75 BPM

## 2018-09-06 DIAGNOSIS — J45.40 MODERATE PERSISTENT ASTHMA, UNCOMPLICATED: Primary | Chronic | ICD-10-CM

## 2018-09-06 DIAGNOSIS — Z23 NEED FOR PROPHYLACTIC VACCINATION AND INOCULATION AGAINST INFLUENZA: ICD-10-CM

## 2018-09-06 DIAGNOSIS — D80.4 IGM DEFICIENCY (H): Chronic | ICD-10-CM

## 2018-09-06 DIAGNOSIS — J31.0 OTHER CHRONIC RHINITIS: ICD-10-CM

## 2018-09-06 DIAGNOSIS — R04.0 EPISTAXIS: ICD-10-CM

## 2018-09-06 LAB
FEF 25/75: NORMAL
FEV-1: NORMAL
FEV1/FVC: NORMAL
FVC: NORMAL

## 2018-09-06 PROCEDURE — 94010 BREATHING CAPACITY TEST: CPT | Performed by: ALLERGY & IMMUNOLOGY

## 2018-09-06 PROCEDURE — 99214 OFFICE O/P EST MOD 30 MIN: CPT | Mod: 25 | Performed by: ALLERGY & IMMUNOLOGY

## 2018-09-06 PROCEDURE — 90686 IIV4 VACC NO PRSV 0.5 ML IM: CPT | Performed by: ALLERGY & IMMUNOLOGY

## 2018-09-06 PROCEDURE — 90471 IMMUNIZATION ADMIN: CPT | Performed by: ALLERGY & IMMUNOLOGY

## 2018-09-06 RX ORDER — AZITHROMYCIN 250 MG/1
TABLET, FILM COATED ORAL
Qty: 30 TABLET | Refills: 2 | Status: SHIPPED | OUTPATIENT
Start: 2018-09-06 | End: 2018-10-19

## 2018-09-06 ASSESSMENT — ENCOUNTER SYMPTOMS
FATIGUE: 0
EYE ITCHING: 0
ADENOPATHY: 0
ACTIVITY CHANGE: 0
FEVER: 0
FACIAL SWELLING: 0
DIARRHEA: 0
CHEST TIGHTNESS: 0
SINUS PRESSURE: 0
JOINT SWELLING: 0
NAUSEA: 0
RHINORRHEA: 0
EYE REDNESS: 0
ARTHRALGIAS: 0
EYE DISCHARGE: 0
WHEEZING: 0
VOMITING: 0
COUGH: 0
SHORTNESS OF BREATH: 0
HEADACHES: 0
MYALGIAS: 0

## 2018-09-06 NOTE — PROGRESS NOTES

## 2018-09-06 NOTE — MR AVS SNAPSHOT
After Visit Summary   9/6/2018    Markell Ramsey    MRN: [id number]           Patient Information     Date Of Birth          1960        Visit Information        Provider Department      9/6/2018 8:40 AM Jose Angel Leiva MD [facility]        Today's Diagnoses     Moderate persistent asthma, uncomplicated    -  1    Other chronic rhinitis        Epistaxis        IgM deficiency (H)           Follow-ups after your visit        Additional Services     OTOLARYNGOLOGY REFERRAL       Your provider has referred you to: [facility]: [facility] [phone]   [email or web address]    Please be aware that coverage of these services is subject to the terms and limitations of your health insurance plan.  Call member services at your health plan with any benefit or coverage questions.      Please bring the following with you to your appointment:    (1) Any X-Rays, CTs or MRIs which have been performed.  Contact the facility where they were done to arrange for  prior to your scheduled appointment.   (2) List of current medications  (3) This referral request   (4) Any documents/labs given to you for this referral                  Follow-up notes from your care team     Return in about 6 weeks (around 10/19/2018), or if symptoms worsen or fail to improve.      Your next 10 appointments already scheduled     Oct 19, 2018 10:00 AM CDT   Return Visit with Jose Angel Leiva MD   [facility]    [address] [address] [phone]              Who to contact     If you have questions or need follow up information about today's clinic visit or your schedule please contact [facility] directly at [phone].  Normal or non-critical lab and imaging results will be communicated to you by MyChart, letter or phone within 4 business days after the clinic has received the results. If you do not hear  "from us within 7 days, please contact the clinic through [employer] or phone. If you have a critical or abnormal lab result, we will notify you by phone as soon as possible.  Submit refill requests through [employer] or call your pharmacy and they will forward the refill request to us. Please allow 3 business days for your refill to be completed.          Additional Information About Your Visit        [employer] Information     [employer] gives you secure access to your electronic health record. If you see a primary care provider, you can also send messages to your care team and make appointments. If you have questions, please call your primary care clinic.  If you do not have a primary care provider, please call [phone] and they will assist you.        Care EveryWhere ID     This is your Care EveryWhere ID. This could be used by other organizations to access your [place] medical records  [id number]        Your Vitals Were     Pulse Temperature Respirations Height Pulse Oximetry BMI (Body Mass Index)    75 96.8  F (36  C) (Oral) 16 1.75 m (5' 8.9\") 97% 33.7 kg/m2       Blood Pressure from Last 3 Encounters:   09/06/18 117/73   07/23/18 124/70   06/12/18 131/66    Weight from Last 3 Encounters:   09/06/18 103.2 kg (227 lb 8.2 oz)   07/23/18 105.4 kg (232 lb 5.8 oz)   06/12/18 103.6 kg (228 lb 6.3 oz)              We Performed the Following     OTOLARYNGOLOGY REFERRAL     Spirometry, Breathing Capacity          Today's Medication Changes          These changes are accurate as of 9/6/18  9:16 AM.  If you have any questions, ask your nurse or doctor.               Start taking these medicines.        Dose/Directions    azithromycin 250 MG tablet   Commonly known as:  ZITHROMAX   Used for:  IgM deficiency (H)   Started by:  Jose Angel Leiva MD        Take one tablet by mouth once daily on Mondays, Wednesdays and Fridays.   Quantity:  30 tablet   Refills:  2         These medicines have changed or have updated prescriptions.  "       Dose/Directions    fluticasone-vilanterol 100-25 MCG/INH inhaler   Commonly known as:  BREO ELLIPTA   This may have changed:  Another medication with the same name was removed. Continue taking this medication, and follow the directions you see here.   Used for:  Moderate persistent asthma, uncomplicated   Changed by:  Jose Angel Leiva MD        Dose:  1 puff   Inhale 1 puff into the lungs daily   Quantity:  1 Inhaler   Refills:  3       omeprazole 20 MG CR capsule   Commonly known as:  priLOSEC   This may have changed:  when to take this   Used for:  GERD (gastroesophageal reflux disease)        Dose:  20 mg   Take 1 capsule by mouth 2 times daily.   Quantity:  180 capsule   Refills:  3            Where to get your medicines      These medications were sent to [place] [address] [place] [address] [address]     Phone:  [phone]     azithromycin 250 MG tablet                Primary Care Provider Office Phone # Fax #    Mary Lezama,  [phone] [address] [facility] [address]        Equal Access to Services     [facility] : Hadii dian portillo hadasho Soomaali, waaxda luqadaha, qaybta kaalmada adeegyada, waxay josé miguel haylalo ervin . So [facility] [phone].    ATENCIÓN: Si habla español, tiene a gates disposición servicios gratuitos de asistencia lingüística. Llame al [phone].    We comply with applicable federal civil rights laws and [place] laws. We do not discriminate on the basis of race, color, national origin, age, disability, sex, sexual orientation, or gender identity.            Thank you!     Thank you for choosing [facility]  for your care. Our goal is always to provide you with excellent care. Hearing back from our patients is one way we can continue to improve our services. Please take a few minutes to complete the written survey that you may receive in the mail after your visit with  us. Thank you!             Your Updated Medication List - Protect others around you: Learn how to safely use, store and throw away your medicines at www.disposemymeds.org.          This list is accurate as of 9/6/18  9:16 AM.  Always use your most recent med list.                   Brand Name Dispense Instructions for use Diagnosis    * albuterol (2.5 MG/3ML) 0.083% neb solution     30 vial    Take 1 vial (2.5 mg) by nebulization every 4 hours as needed for shortness of breath / dyspnea    Moderate persistent asthma, uncomplicated       * albuterol 108 (90 Base) MCG/ACT inhaler    VENTOLIN HFA    1 Inhaler    Inhale 2 puffs into the lungs every 4 hours as needed Take for shortness of breath, cough or wheeze. Take before exercise.    Moderate persistent asthma, uncomplicated       atorvastatin 10 MG tablet    LIPITOR    90 tablet    TAKE 1 TABLET BY MOUTH DAILY    Hyperlipidemia LDL goal <130       azelastine 0.1 % nasal spray    ASTELIN    30 mL    Spray 1 spray into both nostrils 2 times daily    Other chronic rhinitis       azithromycin 250 MG tablet    ZITHROMAX    30 tablet    Take one tablet by mouth once daily on Mondays, Wednesdays and Fridays.    IgM deficiency (H)       beclomethasone HFA 80 MCG/ACT inhaler    QVAR REDIHALER    1 Inhaler    Inhale 2 puffs into the lungs 3 times daily In the yellow and red zones only of asthma action plan    Moderate persistent asthma, uncomplicated       flunisolide 25 MCG/ACT (0.025%) Soln spray    NASALIDE    1 Bottle    Spray 2 sprays into both nostrils 2 times daily    Chronic seasonal allergic rhinitis due to pollen       fluticasone-vilanterol 100-25 MCG/INH inhaler    BREO ELLIPTA    1 Inhaler    Inhale 1 puff into the lungs daily    Moderate persistent asthma, uncomplicated       ipratropium - albuterol 0.5 mg/2.5 mg/3 mL 0.5-2.5 (3) MG/3ML neb solution    DUONEB    30 vial    Take 1 vial (3 mLs) by nebulization every 6 hours as needed for shortness of breath /  dyspnea or wheezing    Not well controlled moderate persistent asthma       lisinopril-hydrochlorothiazide 10-12.5 MG per tablet    PRINZIDE/ZESTORETIC    90 tablet    Take 1 tablet by mouth daily    Essential hypertension       montelukast 10 MG tablet    SINGULAIR    90 tablet    Take 1 tablet (10 mg) by mouth At Bedtime    Moderate persistent asthma, uncomplicated       nebulizer/tubing/mouthpiece Kit     1 kit    use with nebulizer    Moderate persistent asthma, uncomplicated       omeprazole 20 MG CR capsule    priLOSEC    180 capsule    Take 1 capsule by mouth 2 times daily.    GERD (gastroesophageal reflux disease)       * Notice:  This list has 2 medication(s) that are the same as other medications prescribed for you. Read the directions carefully, and ask your doctor or other care provider to review them with you.

## 2018-09-06 NOTE — LETTER
9/6/2018         RE: Markell Ramsey  [address] [facility] [id number]        Dear Colleague,    Thank you for referring your patient, Markell Ramsey, to the [facility]. Please see a copy of my visit note below.    SUBJECTIVE:                                                                 Markell Ramsey presents today to our Allergy Clinic at [facility]  for a follow up visit.    As you know, he is a 58-year-old male with moderate persistent asthma, allergic rhinitis, recurrent sinus infection/bronchitis and decreased IgM.      November 2017:Normal CH 50. Normal total IgE.  Normal total IgG.  Normal IgG subclasses.   Normal total IgA level. Decreased IgM level on multiple occasions. Normal T and B cells (flow cytometry).  20/22 protective pneumococcal antibody levels. Protective tetanus antibody level.       SPT in 2007 was positive to molds, tree pollen, grass mix, and ragweed. IDs were positive for cat, roaches,  dog, mite. Aspergillus mold, and marsh elder. Started by Dr. Whittington on allergen IT in 2007,  [facility] Whittington [facility] to practice before. He received allergen immunotherapy on and off until 2010, which he found partially helpful.       Sinus CT in 2013, with mild-moderate sinus disease, and DNS on the left. The patient confirms nasal congestion L>R.      IMPRESSION:  Mild/moderate mucosal thickening in the paranasal   sinuses as described above.  Slight worsening of the right maxillary  sinus and worsening of the right frontal sinus since the previous  exam, but clearing of the left sphenoid sinus.      In regards to his asthma, we stepped down to Breo Ellipta 100/25 mcg 1 puff once daily. He continues taking montelukast 10 mg by mouth once daily.   There were some issues with his pharmacy, so he filled the medicine approximately 3 weeks ago.  He continues doing well.  Markell is using albuterol HFA  less than twice per week for rescue from  chest symptoms. He is waking up less than twice per month due to chest symptoms.  There has been no use of oral steroids since the last visit. No ED/PCP/urgent care/other specialist visits for asthma flare since the last visit. The patient denies current chest tightness, cough, wheeze or SOB.      Rhinitis is relatively well controlled with flunisolide 1-2 sprays/nostril twice daily, azelastine as needed and montelukast 10 mg by mouth daily. Had some mild symptoms several days ago. Has intermittent epistaxis. Most of the episodes are short lasting, but some may last up to 30-40 mins. The patient denies clear rhinorrhea, nasal itch, stuffiness, sneezing or interval sinusitis symptoms of fever, facial pain or purulent rhinorrhea.    Patient Active Problem List   Diagnosis     Gastroesophageal reflux disease without esophagitis     Non morbid obesity due to excess calories     CARDIOVASCULAR SCREENING; LDL GOAL LESS THAN 160     IgM deficiency (H)     Moderate persistent asthma, uncomplicated     Essential hypertension     Hyperlipidemia LDL goal <130     DNS (deviated nasal septum)     Other chronic rhinitis       Past Medical History:   Diagnosis Date     Acute bronchitis      Allergic rhinitis, cause unspecified 9/23/2008     Allergic rhinitis, unspecified allergic rhinitis type 9/23/2008     Problem list name updated by automated process. Provider to review     Esophageal reflux      Pneumonia 2/24/2013    MRSA, required hospitalization     Unspecified asthma(493.90)       Problem (# of Occurrences) Relation (Name,Age of Onset)    Allergies (1) Mother: asthma    Cancer (1) Father: lung    Respiratory (3) Sister: asthma, Son (Luke): Upper Resp infections, Son (David): asthma        Past Surgical History:   Procedure Laterality Date     C APPENDECTOMY  08/2006     SURGICAL HISTORY OF -   2/23/06    L4-5 microdiscectomy     TONSILLECTOMY & ADENOIDECTOMY       Social History     Social History     Marital status:       Spouse name: N/A     Number of children: N/A     Years of education: N/A     Social History Main Topics     Smoking status: Never Smoker     Smokeless tobacco: Never Used     Alcohol use Yes      Comment: rare     Drug use: No     Sexual activity: Not Asked     Other Topics Concern     None     Social History Narrative    November 3, 2017        ENVIRONMENTAL HISTORY: [place] The home is heated with a forced air and gas furnace. They does have central air conditioning. The patient's bedroom is furnished with hard naseem in bedroom, allergen mattress cover and allergen pillowcase cover. No pets inside the house. There is no history of cockroach or mice infestation. There are no smokers in the house.  The house does not have a damp basement.                Review of Systems   Constitutional: Negative for activity change, fatigue and fever.   HENT: Positive for congestion, nosebleeds and sneezing. Negative for dental problem, ear pain, facial swelling, postnasal drip, rhinorrhea and sinus pressure.    Eyes: Negative for discharge, redness and itching.   Respiratory: Negative for cough, chest tightness, shortness of breath and wheezing.    Cardiovascular: Negative for chest pain.   Gastrointestinal: Negative for diarrhea, nausea and vomiting.   Musculoskeletal: Negative for arthralgias, joint swelling and myalgias.   Skin: Negative for rash.   Neurological: Negative for headaches.   Hematological: Negative for adenopathy.   Psychiatric/Behavioral: Negative for behavioral problems and self-injury.           Current Outpatient Prescriptions:      atorvastatin (LIPITOR) 10 MG tablet, TAKE 1 TABLET BY MOUTH DAILY, Disp: 90 tablet, Rfl: 0     azelastine (ASTELIN) 0.1 % spray, Spray 1 spray into both nostrils 2 times daily, Disp: 30 mL, Rfl: 3     azithromycin (ZITHROMAX) 250 MG tablet, Take one tablet by mouth once daily on Mondays, Wednesdays and Fridays., Disp:  30 tablet, Rfl: 2     flunisolide (NASALIDE) 25 MCG/ACT (0.025%) SOLN spray, Spray 2 sprays into both nostrils 2 times daily, Disp: 1 Bottle, Rfl: 3     fluticasone-vilanterol (BREO ELLIPTA) 100-25 MCG/INH inhaler, Inhale 1 puff into the lungs daily, Disp: 1 Inhaler, Rfl: 3     lisinopril-hydrochlorothiazide (PRINZIDE/ZESTORETIC) 10-12.5 MG per tablet, Take 1 tablet by mouth daily, Disp: 90 tablet, Rfl: 1     montelukast (SINGULAIR) 10 MG tablet, Take 1 tablet (10 mg) by mouth At Bedtime, Disp: 90 tablet, Rfl: 3     omeprazole (PRILOSEC) 20 MG capsule, Take 1 capsule by mouth 2 times daily. (Patient taking differently: Take 20 mg by mouth daily ), Disp: 180 capsule, Rfl: 3     albuterol (2.5 MG/3ML) 0.083% nebulizer solution, Take 1 vial (2.5 mg) by nebulization every 4 hours as needed for shortness of breath / dyspnea (Patient not taking: Reported on 9/6/2018), Disp: 30 vial, Rfl: 11     albuterol (VENTOLIN HFA) 108 (90 BASE) MCG/ACT Inhaler, Inhale 2 puffs into the lungs every 4 hours as needed Take for shortness of breath, cough or wheeze. Take before exercise. (Patient not taking: Reported on 9/6/2018), Disp: 1 Inhaler, Rfl: 11     beclomethasone HFA (QVAR REDIHALER) 80 MCG/ACT AERB inhaler, Inhale 2 puffs into the lungs 3 times daily In the yellow and red zones only of asthma action plan (Patient not taking: Reported on 7/23/2018), Disp: 1 Inhaler, Rfl: 3     ipratropium - albuterol 0.5 mg/2.5 mg/3 mL (DUONEB) 0.5-2.5 (3) MG/3ML neb solution, Take 1 vial (3 mLs) by nebulization every 6 hours as needed for shortness of breath / dyspnea or wheezing (Patient not taking: Reported on 9/6/2018), Disp: 30 vial, Rfl: 1     Respiratory Therapy Supplies (NEBULIZER/TUBING/MOUTHPIECE) KIT, use with nebulizer (Patient not taking: Reported on 9/6/2018), Disp: 1 kit, Rfl: 11  Immunization History   Administered Date(s) Administered     Influenza (H1N1) 01/11/2010     Influenza (IIV3) PF 10/29/2008, 08/26/2009, 10/05/2010      "Influenza Vaccine IM 3yrs+ 4 Valent IIV4 10/23/2015, 09/21/2017, 09/06/2018     Pneumococcal 23 valent 01/29/2016     TDAP Vaccine (Adacel) 11/18/2008     No Known Allergies  OBJECTIVE:                                                                 /73 (BP Location: Left arm, Patient Position: Sitting, Cuff Size: Adult Large)  Pulse 75  Temp 96.8  F (36  C) (Oral)  Resp 16  Ht 1.75 m (5' 8.9\")  Wt 103.2 kg (227 lb 8.2 oz)  SpO2 97%  BMI 33.7 kg/m2        Physical Exam   Constitutional: No distress.   obese   HENT:   Head: Normocephalic and atraumatic.   Right Ear: Tympanic membrane, external ear and ear canal normal.   Left Ear: Tympanic membrane, external ear and ear canal normal.   Nose: Septal deviation (Septal spur on the left noted.) present. No mucosal edema or rhinorrhea.   Mouth/Throat: Oropharynx is clear and moist and mucous membranes are normal. No oropharyngeal exudate, posterior oropharyngeal edema or posterior oropharyngeal erythema.   Eyes: Conjunctivae are normal. Right eye exhibits no discharge. Left eye exhibits no discharge.   Neck: Normal range of motion.   Cardiovascular: Normal rate, regular rhythm and normal heart sounds.    No murmur heard.  Pulmonary/Chest: Effort normal. No respiratory distress. He has no wheezes. He has no rales.        Musculoskeletal: Normal range of motion.   Neurological: He is alert.   Skin: Skin is warm. He is not diaphoretic.   Psychiatric: Affect normal.   Nursing note and vitals reviewed.      WORKUP:   SPIROMETRY       FVC 3.82L (82% of predicted).     FEV1 3.24L (90% of predicted).     FEV1/FVC 85%     FEF 25%-75%  3.70L/s (118% of predicted)      The office spirometry performed today doesn't suggest an obstruction.      Asthma Control Test (ACT) total score: 25       ASSESSMENT/PLAN:      Problem List Items Addressed This Visit        Respiratory    1. Moderate persistent asthma, uncomplicated - Primary (Chronic)  Currently well controlled with " Breo Ellipta 100/25 mcg 1 puff once daily, montelukast 10 mg by mouth once daily and albuterol inhaler as needed.  -Continue as is.  Follow-up in 6 weeks.  If he continues doing well, will leave the way it is, and consider further stepdown in Spring.    Relevant Orders    Spirometry, Breathing Capacity (Completed)    2. Other chronic rhinitis  Currently well controlled with flunisolide 1-2 sprays in each nostril twice daily, azelastine as needed and montelukast 10 mg by mouth daily.  -Continue as is.       Immune    3. IgM deficiency (H) (Chronic)  He is prone to bronchitis and sinus infections and cold seasons.  -He will start azithromycin 250 mg by mouth once daily on Mondays, Wednesdays, and Fridays once in its getting cold.  The plan is to stop it in Spring.    Relevant Medications    azithromycin (ZITHROMAX) 250 MG tablet      Other Visit Diagnoses     4. Epistaxis      Could be secondary to intranasal steroid, but some of the episodes were long-lasting.  -Referred to ENT to see if the patient needs cauterization.    Relevant Orders    OTOLARYNGOLOGY REFERRAL    5. Need for prophylactic vaccination and inoculation against influenza        Relevant Orders    FLU VACCINE, SPLIT VIRUS, IM (QUADRIVALENT) [57156]- >3 YRS (Completed)    Vaccine Administration, Initial [80495] (Completed)        I spent 25 minutes on this visit, with at least 15 minutes face-to-face counseling the patient about rhinitis, asthma, epistaxis, and IgM deficiency management.    Return in about 6 weeks (around 10/19/2018), or if symptoms worsen or fail to improve.    Thank you for allowing us to participate in the care of this patient. Please feel free to contact us if there are any questions or concerns about the patient.    Disclaimer: This note consists of symbols derived from keyboarding, dictation and/or voice recognition software. As a result, there may be errors in the script that have gone undetected. Please consider this when  interpreting information found in this chart.    Jose Angel Leiva MD, FACAAI  Allergy, Asthma and Immunology  [place], [place] and [place]        Injectable Influenza Immunization Documentation    1.  Is the person to be vaccinated sick today?   No    2. Does the person to be vaccinated have an allergy to a component   of the vaccine?   No  Egg Allergy Algorithm Link    3. Has the person to be vaccinated ever had a serious reaction   to influenza vaccine in the past?   No    4. Has the person to be vaccinated ever had Guillain-Barré syndrome?   No    Form completed by Delphine CALDWELL           Again, thank you for allowing me to participate in the care of your patient.        Sincerely,        Jose Angel Leiva MD

## 2018-09-06 NOTE — LETTER
My Asthma Action Plan  Name: Markell Ramsey   YOB: 1960  Date:9/6/2018    My doctor: Jose Angel Leiva MD   My clinic: [facility]        My Control Medicine: Fluticasone furoate + vilanterol (Breo Ellipta)-  100/25mcg 1 puff once daily AND  Montelukast (Singulair) -  10 mg by mouth once a day  My Rescue Medicine: Albuterol nebulizer solution every 4 hrs as needed  Albuterol (Proair/Ventolin/Proventil) inhaler 2-4 puffs every 4 hrs as needed   My Asthma Severity: moderate persistent  Avoid your asthma triggers: upper respiratory infections               GREEN ZONE   Good Control    I feel good    No cough or wheeze    Can work, sleep and play without asthma symptoms       Take your asthma control medicine every day.     1. If exercise triggers your asthma, take your rescue medication    15 minutes before exercise or sports, and    During exercise if you have asthma symptoms  2. Spacer to use with inhaler: If you have a spacer, make sure to use it with your inhaler             YELLOW ZONE Getting Worse  I have ANY of these:    I do not feel good    Cough or wheeze    Chest feels tight    Wake up at night   1. Keep taking your Green Zone medications, and Start QVAR 80 mcg 2 puffs three times daily.   2. Start taking your rescue medicine:    every 20 minutes for up to 1 hour. Then every 4 hours for 24-48 hours.  3. If you stay in the Yellow Zone for more than 48  hours, contact your doctor.             RED ZONE Medical Alert - Get Help  I have ANY of these:    I feel awful    Medicine is not helping    Breathing getting harder    Trouble walking or talking    Nose opens wide to breathe       1. Take your rescue medicine NOW  2. If your provider has prescribed an oral steroid medicine, start taking it NOW  3. Call your doctor NOW  4. If you are still in the Red Zone after 20 minutes and you have not reached your doctor:    Take your rescue medicine again and    Call 911 or go to the emergency room  right away    See your regular doctor within 2 weeks of an Emergency Room or Urgent Care visit for follow-up treatment.        Electronically signed by: Jose Angel Leiva, 9/6/2018            Annual Reminders:  Meet with Asthma Educator,  Flu Shot in the Fall, consider Pneumonia Vaccination for patients with asthma (aged 19 and older).    Pharmacy:    [id number] - Critical access hospital [address] AT [facility] [address] AT [address]AL TO [facility]  [id number] - [place], [place] - [address]                    Asthma Triggers  How To Control Things That Make Your Asthma Worse    Triggers are things that make your asthma worse.  Look at the list below to help you find your triggers and what you can do about them.  You can help prevent asthma flare-ups by staying away from your triggers.      Trigger                                                          What you can do   Cigarette Smoke  Tobacco smoke can make asthma worse. Do not allow smoking in your home, car or around you.  Be sure no one smokes at a child s day care or school.  If you smoke, ask your health care provider for ways to help you quit.  Ask family members to quit too.  Ask your health care provider for a referral to Quit Plan to help you quit smoking, or call 7-208-144-PLAN.     Colds, Flu, Bronchitis  These are common triggers of asthma. Wash your hands often.  Don t touch your eyes, nose or mouth.  Get a flu shot every year.     Dust Mites  These are tiny bugs that live in cloth or carpet. They are too small to see. Wash sheets and blankets in hot water every week.   Encase pillows and mattress in dust mite proof covers.  Avoid having carpet if you can. If you have carpet, vacuum weekly.   Use a dust mask and HEPA vacuum.   Pollen and Outdoor Mold  Some people are allergic to trees, grass, or weed pollen, or molds. Try to keep your  windows closed.  Limit time out doors when pollen count is high.   Ask you health care provider about taking medicine during allergy season.     Animal Dander  Some people are allergic to skin flakes, urine or saliva from pets with fur or feathers. Keep pets with fur or feathers out of your home.    If you can t keep the pet outdoors, then keep the pet out of your bedroom.  Keep the bedroom door closed.  Keep pets off cloth furniture and away from stuffed toys.     Mice, Rats, and Cockroaches  Some people are allergic to the waste from these pests.   Cover food and garbage.  Clean up spills and food crumbs.  Store grease in the refrigerator.   Keep food out of the bedroom.   Indoor Mold  This can be a trigger if your home has high moisture. Fix leaking faucets, pipes, or other sources of water.   Clean moldy surfaces.  Dehumidify basement if it is damp and smelly.   Smoke, Strong Odors, and Sprays  These can reduce air quality. Stay away from strong odors and sprays, such as perfume, powder, hair spray, paints, smoke incense, paint, cleaning products, candles and new carpet.   Exercise or Sports  Some people with asthma have this trigger. Be active!  Ask your doctor about taking medicine before sports or exercise to prevent symptoms.    Warm up for 5-10 minutes before and after sports or exercise.     Other Triggers of Asthma  Cold air:  Cover your nose and mouth with a scarf.  Sometimes laughing or crying can be a trigger.  Some medicines and food can trigger asthma.

## 2018-09-06 NOTE — PROGRESS NOTES
SUBJECTIVE:                                                                 Markell Ramsey presents today to our Allergy Clinic at [facility]  for a follow up visit.    As you know, he is a 58-year-old male with moderate persistent asthma, allergic rhinitis, recurrent sinus infection/bronchitis and decreased IgM.      November 2017:Normal CH 50. Normal total IgE.  Normal total IgG.  Normal IgG subclasses.   Normal total IgA level. Decreased IgM level on multiple occasions. Normal T and B cells (flow cytometry).  20/22 protective pneumococcal antibody levels. Protective tetanus antibody level.       SPT in 2007 was positive to molds, tree pollen, grass mix, and ragweed. IDs were positive for cat, roaches,  dog, mite. Aspergillus mold, and marsh elder. Started by Dr. Whittington on allergen IT in 2007,  [facility] Whittington used to practice before. He received allergen immunotherapy on and off until 2010, which he found partially helpful.       Sinus CT in 2013, with mild-moderate sinus disease, and DNS on the left. The patient confirms nasal congestion L>R.      IMPRESSION:  Mild/moderate mucosal thickening in the paranasal   sinuses as described above.  Slight worsening of the right maxillary  sinus and worsening of the right frontal sinus since the previous  exam, but clearing of the left sphenoid sinus.      In regards to his asthma, we stepped down to Breo Ellipta 100/25 mcg 1 puff once daily. He continues taking montelukast 10 mg by mouth once daily.   There were some issues with his pharmacy, so he filled the medicine approximately 3 weeks ago.  He continues doing well.  Markell is using albuterol HFA  less than twice per week for rescue from chest symptoms. He is waking up less than twice per month due to chest symptoms.  There has been no use of oral steroids since the last visit. No ED/PCP/urgent care/other specialist visits for asthma flare since the last visit. The  patient denies current chest tightness, cough, wheeze or SOB.      Rhinitis is relatively well controlled with flunisolide 1-2 sprays/nostril twice daily, azelastine as needed and montelukast 10 mg by mouth daily. Had some mild symptoms several days ago. Has intermittent epistaxis. Most of the episodes are short lasting, but some may last up to 30-40 mins. The patient denies clear rhinorrhea, nasal itch, stuffiness, sneezing or interval sinusitis symptoms of fever, facial pain or purulent rhinorrhea.    Patient Active Problem List   Diagnosis     Gastroesophageal reflux disease without esophagitis     Non morbid obesity due to excess calories     CARDIOVASCULAR SCREENING; LDL GOAL LESS THAN 160     IgM deficiency (H)     Moderate persistent asthma, uncomplicated     Essential hypertension     Hyperlipidemia LDL goal <130     DNS (deviated nasal septum)     Other chronic rhinitis       Past Medical History:   Diagnosis Date     Acute bronchitis      Allergic rhinitis, cause unspecified 9/23/2008     Allergic rhinitis, unspecified allergic rhinitis type 9/23/2008     Problem list name updated by automated process. Provider to review     Esophageal reflux      Pneumonia 2/24/2013    MRSA, required hospitalization     Unspecified asthma(493.90)       Problem (# of Occurrences) Relation (Name,Age of Onset)    Allergies (1) Mother: asthma    Cancer (1) Father: lung    Respiratory (3) Sister: asthma, Son (Luke): Upper Resp infections, Son (David): asthma        Past Surgical History:   Procedure Laterality Date     C APPENDECTOMY  08/2006     SURGICAL HISTORY OF -   2/23/06    L4-5 microdiscectomy     TONSILLECTOMY & ADENOIDECTOMY       Social History     Social History     Marital status:      Spouse name: N/A     Number of children: N/A     Years of education: N/A     Social History Main Topics     Smoking status: Never Smoker     Smokeless tobacco: Never Used     Alcohol use Yes      Comment: rare     Drug use: No      Sexual activity: Not Asked     Other Topics Concern     None     Social History Narrative    November 3, 2017        ENVIRONMENTAL HISTORY: [place] The home is heated with a forced air and gas furnace. They does have central air conditioning. The patient's bedroom is furnished with hard naseem in bedroom, allergen mattress cover and allergen pillowcase cover. No pets inside the house. There is no history of cockroach or mice infestation. There are no smokers in the house.  The house does not have a damp basement.                Review of Systems   Constitutional: Negative for activity change, fatigue and fever.   HENT: Positive for congestion, nosebleeds and sneezing. Negative for dental problem, ear pain, facial swelling, postnasal drip, rhinorrhea and sinus pressure.    Eyes: Negative for discharge, redness and itching.   Respiratory: Negative for cough, chest tightness, shortness of breath and wheezing.    Cardiovascular: Negative for chest pain.   Gastrointestinal: Negative for diarrhea, nausea and vomiting.   Musculoskeletal: Negative for arthralgias, joint swelling and myalgias.   Skin: Negative for rash.   Neurological: Negative for headaches.   Hematological: Negative for adenopathy.   Psychiatric/Behavioral: Negative for behavioral problems and self-injury.           Current Outpatient Prescriptions:      atorvastatin (LIPITOR) 10 MG tablet, TAKE 1 TABLET BY MOUTH DAILY, Disp: 90 tablet, Rfl: 0     azelastine (ASTELIN) 0.1 % spray, Spray 1 spray into both nostrils 2 times daily, Disp: 30 mL, Rfl: 3     azithromycin (ZITHROMAX) 250 MG tablet, Take one tablet by mouth once daily on Mondays, Wednesdays and Fridays., Disp: 30 tablet, Rfl: 2     flunisolide (NASALIDE) 25 MCG/ACT (0.025%) SOLN spray, Spray 2 sprays into both nostrils 2 times daily, Disp: 1 Bottle, Rfl: 3     fluticasone-vilanterol (BREO ELLIPTA) 100-25 MCG/INH inhaler, Inhale 1 puff into the  lungs daily, Disp: 1 Inhaler, Rfl: 3     lisinopril-hydrochlorothiazide (PRINZIDE/ZESTORETIC) 10-12.5 MG per tablet, Take 1 tablet by mouth daily, Disp: 90 tablet, Rfl: 1     montelukast (SINGULAIR) 10 MG tablet, Take 1 tablet (10 mg) by mouth At Bedtime, Disp: 90 tablet, Rfl: 3     omeprazole (PRILOSEC) 20 MG capsule, Take 1 capsule by mouth 2 times daily. (Patient taking differently: Take 20 mg by mouth daily ), Disp: 180 capsule, Rfl: 3     albuterol (2.5 MG/3ML) 0.083% nebulizer solution, Take 1 vial (2.5 mg) by nebulization every 4 hours as needed for shortness of breath / dyspnea (Patient not taking: Reported on 9/6/2018), Disp: 30 vial, Rfl: 11     albuterol (VENTOLIN HFA) 108 (90 BASE) MCG/ACT Inhaler, Inhale 2 puffs into the lungs every 4 hours as needed Take for shortness of breath, cough or wheeze. Take before exercise. (Patient not taking: Reported on 9/6/2018), Disp: 1 Inhaler, Rfl: 11     beclomethasone HFA (QVAR REDIHALER) 80 MCG/ACT AERB inhaler, Inhale 2 puffs into the lungs 3 times daily In the yellow and red zones only of asthma action plan (Patient not taking: Reported on 7/23/2018), Disp: 1 Inhaler, Rfl: 3     ipratropium - albuterol 0.5 mg/2.5 mg/3 mL (DUONEB) 0.5-2.5 (3) MG/3ML neb solution, Take 1 vial (3 mLs) by nebulization every 6 hours as needed for shortness of breath / dyspnea or wheezing (Patient not taking: Reported on 9/6/2018), Disp: 30 vial, Rfl: 1     Respiratory Therapy Supplies (NEBULIZER/TUBING/MOUTHPIECE) KIT, use with nebulizer (Patient not taking: Reported on 9/6/2018), Disp: 1 kit, Rfl: 11  Immunization History   Administered Date(s) Administered     Influenza (H1N1) 01/11/2010     Influenza (IIV3) PF 10/29/2008, 08/26/2009, 10/05/2010     Influenza Vaccine IM 3yrs+ 4 Valent IIV4 10/23/2015, 09/21/2017, 09/06/2018     Pneumococcal 23 valent 01/29/2016     TDAP Vaccine (Adacel) 11/18/2008     No Known Allergies  OBJECTIVE:                                                     "             /73 (BP Location: Left arm, Patient Position: Sitting, Cuff Size: Adult Large)  Pulse 75  Temp 96.8  F (36  C) (Oral)  Resp 16  Ht 1.75 m (5' 8.9\")  Wt 103.2 kg (227 lb 8.2 oz)  SpO2 97%  BMI 33.7 kg/m2        Physical Exam   Constitutional: No distress.   obese   HENT:   Head: Normocephalic and atraumatic.   Right Ear: Tympanic membrane, external ear and ear canal normal.   Left Ear: Tympanic membrane, external ear and ear canal normal.   Nose: Septal deviation (Septal spur on the left noted.) present. No mucosal edema or rhinorrhea.   Mouth/Throat: Oropharynx is clear and moist and mucous membranes are normal. No oropharyngeal exudate, posterior oropharyngeal edema or posterior oropharyngeal erythema.   Eyes: Conjunctivae are normal. Right eye exhibits no discharge. Left eye exhibits no discharge.   Neck: Normal range of motion.   Cardiovascular: Normal rate, regular rhythm and normal heart sounds.    No murmur heard.  Pulmonary/Chest: Effort normal. No respiratory distress. He has no wheezes. He has no rales.        Musculoskeletal: Normal range of motion.   Neurological: He is alert.   Skin: Skin is warm. He is not diaphoretic.   Psychiatric: Affect normal.   Nursing note and vitals reviewed.      WORKUP:   SPIROMETRY       FVC 3.82L (82% of predicted).     FEV1 3.24L (90% of predicted).     FEV1/FVC 85%     FEF 25%-75%  3.70L/s (118% of predicted)      The office spirometry performed today doesn't suggest an obstruction.      Asthma Control Test (ACT) total score: 25       ASSESSMENT/PLAN:      Problem List Items Addressed This Visit        Respiratory    1. Moderate persistent asthma, uncomplicated - Primary (Chronic)  Currently well controlled with Breo Ellipta 100/25 mcg 1 puff once daily, montelukast 10 mg by mouth once daily and albuterol inhaler as needed.  -Continue as is.  Follow-up in 6 weeks.  If he continues doing well, will leave the way it is, and consider further stepdown " in Spring.    Relevant Orders    Spirometry, Breathing Capacity (Completed)    2. Other chronic rhinitis  Currently well controlled with flunisolide 1-2 sprays in each nostril twice daily, azelastine as needed and montelukast 10 mg by mouth daily.  -Continue as is.       Immune    3. IgM deficiency (H) (Chronic)  He is prone to bronchitis and sinus infections in cold seasons.  -He will start azithromycin 250 mg by mouth once daily on Mondays, Wednesdays, and Fridays once in its getting cold.  The plan is to stop it in Spring.    Relevant Medications    azithromycin (ZITHROMAX) 250 MG tablet      Other Visit Diagnoses     4. Epistaxis      Could be secondary to intranasal steroid, but some of the episodes were long-lasting.  -Referred to ENT to see if the patient needs cauterization.    Relevant Orders    OTOLARYNGOLOGY REFERRAL    5. Need for prophylactic vaccination and inoculation against influenza        Relevant Orders    FLU VACCINE, SPLIT VIRUS, IM (QUADRIVALENT) [21634]- >3 YRS (Completed)    Vaccine Administration, Initial [14485] (Completed)        I spent 25 minutes on this visit, with at least 15 minutes face-to-face counseling the patient about rhinitis, asthma, epistaxis, and IgM deficiency management.    Return in about 6 weeks (around 10/19/2018), or if symptoms worsen or fail to improve.    Thank you for allowing us to participate in the care of this patient. Please feel free to contact us if there are any questions or concerns about the patient.    Disclaimer: This note consists of symbols derived from keyboarding, dictation and/or voice recognition software. As a result, there may be errors in the script that have gone undetected. Please consider this when interpreting information found in this chart.    Jose Angel Leiva MD, FACI  Allergy, Asthma and Immunology  [place], [place] and [place]

## 2018-09-07 ASSESSMENT — ASTHMA QUESTIONNAIRES: ACT_TOTALSCORE: 25

## 2018-09-23 DIAGNOSIS — E78.5 HYPERLIPIDEMIA LDL GOAL <130: ICD-10-CM

## 2018-09-23 NOTE — LETTER
[facility]  [address] [address]  [phone]        September 24, 2018    Markell Ramsey                                                                                                                     [address] [address]            Dear Markell,    We have received a refill request from your pharmacy for Lipitor, however, we were only able to provide a one time fill because you are due for a physical with your provider.   Please call [phone] to schedule an appointment before you are due for your next refill.              Sincerely,         [facility]

## 2018-09-24 DIAGNOSIS — J45.40 MODERATE PERSISTENT ASTHMA, UNCOMPLICATED: Chronic | ICD-10-CM

## 2018-09-24 RX ORDER — ATORVASTATIN CALCIUM 10 MG/1
TABLET, FILM COATED ORAL
Qty: 30 TABLET | Refills: 0 | Status: SHIPPED | OUTPATIENT
Start: 2018-09-24 | End: 2018-10-16

## 2018-09-24 NOTE — TELEPHONE ENCOUNTER
"Requested Prescriptions   Pending Prescriptions Disp Refills     atorvastatin (LIPITOR) 10 MG tablet [Pharmacy Med Name: ATORVASTATIN 10 MG TABLET] 90 tablet 0     Sig: TAKE 1 TABLET BY MOUTH DAILY    Statins Protocol Failed    9/23/2018  2:03 AM       Failed - Recent (12 mo) or future (30 days) visit within the authorizing provider's specialty    Patient had office visit in the last 12 months or has a visit in the next 30 days with authorizing provider or within the authorizing provider's specialty.  See \"Patient Info\" tab in inbasket, or \"Choose Columns\" in Meds & Orders section of the refill encounter.           Passed - LDL on file in past 12 months    Recent Labs   Lab Test  05/02/18   0927   LDL  105*            Passed - No abnormal creatine kinase in past 12 months    No lab results found.            Passed - Patient is age 18 or older      Last Office visit: 9/21/17    Medication is being filled for 1 time refill only due to:  Patient needs to be seen because it has been more than one year since last visit.   Mailed letter to patient.       "

## 2018-09-25 ENCOUNTER — TELEPHONE (OUTPATIENT)
Dept: ALLERGY | Facility: CLINIC | Age: 58
End: 2018-09-25

## 2018-09-25 DIAGNOSIS — J45.40 MODERATE PERSISTENT ASTHMA, UNCOMPLICATED: Chronic | ICD-10-CM

## 2018-09-25 NOTE — TELEPHONE ENCOUNTER
Reason for Call:  Other prescription    Detailed comments: [facility] fax request to 90 day supply on beclomethasone HFA (QVAR REDIHALER) 80 MCG/ACT inhaler    Phone Number Patient can be reached at: Other phone number:  [phone]*    Best Time: any     Can we leave a detailed message on this number? YES    Call taken on 9/25/2018 at 2:28 PM by Karoline Sosa

## 2018-09-27 DIAGNOSIS — I10 ESSENTIAL HYPERTENSION: ICD-10-CM

## 2018-09-27 RX ORDER — LISINOPRIL/HYDROCHLOROTHIAZIDE 10-12.5 MG
TABLET ORAL
Qty: 14 TABLET | Refills: 0 | Status: SHIPPED | OUTPATIENT
Start: 2018-09-27 | End: 2018-10-16

## 2018-09-27 NOTE — TELEPHONE ENCOUNTER
Fax received from [facility]:      PHARMACY COMMENTS:  Could we get this in a 90-day order per insurance?     Denise Behrendt  Specialty CSS

## 2018-09-27 NOTE — TELEPHONE ENCOUNTER
3 inhalers (3 month supply) of QVAR redihaler sent on 9/25 to [facility]. Receipt confirmed by pharmacy (9/25/2018  3:03 PM CDT)    Called and spoke with [facility] to verify. States that it was a system error and to disregard request.     No further questions.     Delphine LU   Allergy RN

## 2018-09-27 NOTE — TELEPHONE ENCOUNTER
"Requested Prescriptions   Pending Prescriptions Disp Refills     lisinopril-hydrochlorothiazide (PRINZIDE/ZESTORETIC) 10-12.5 MG per tablet [Pharmacy Med Name: LISINOPRIL-HCTZ 10-12.5 MG TAB] 90 tablet 0     Sig: TAKE 1 TABLET BY MOUTH EVERY DAY    Diuretics (Including Combos) Protocol Failed    9/27/2018 10:18 AM       Failed - Recent (12 mo) or future (30 days) visit within the authorizing provider's specialty    Patient had office visit in the last 12 months or has a visit in the next 30 days with authorizing provider or within the authorizing provider's specialty.  See \"Patient Info\" tab in inbasket, or \"Choose Columns\" in Meds & Orders section of the refill encounter.           Passed - Blood pressure under 140/90 in past 12 months    BP Readings from Last 3 Encounters:   09/06/18 117/73   07/23/18 124/70   06/12/18 131/66                Passed - Patient is age 18 or older       Passed - Normal serum creatinine on file in past 12 months    Recent Labs   Lab Test  05/02/18 0927   CR  1.13             Passed - Normal serum potassium on file in past 12 months    Recent Labs   Lab Test  05/02/18 0927   POTASSIUM  3.7                   Passed - Normal serum sodium on file in past 12 months    Recent Labs   Lab Test  05/02/18 0927   NA  138                "

## 2018-09-27 NOTE — TELEPHONE ENCOUNTER
Medication is being filled for 1 time refill only due to:  Patient needs to be seen because it has been more than one year since last visit.   Patient notified.  Appt scheduled.  Sarahi MERA RN

## 2018-10-03 DIAGNOSIS — J31.0 CHRONIC RHINITIS: Primary | ICD-10-CM

## 2018-10-03 RX ORDER — AZELASTINE 1 MG/ML
1 SPRAY, METERED NASAL 2 TIMES DAILY
Qty: 30 ML | Refills: 0 | Status: SHIPPED | OUTPATIENT
Start: 2018-10-03 | End: 2018-10-29

## 2018-10-03 NOTE — TELEPHONE ENCOUNTER
Azelastine refilled x 1.  Patient is due for f/u appointment the end of October and has one scheduled for 10/19/18. Left message notifying patient of this.  Kristin Petty RN

## 2018-10-03 NOTE — TELEPHONE ENCOUNTER
Reason for Call:  Medication or medication refill:    Do you use a [facility]?  Name of the pharmacy and phone number for the current request:  [facility] [phone]    Name of the medication requested: azelastine    Other request: LOV:  9/6/18  LAST REFILL:  8/29/18    Can we leave a detailed message on this number? YES    Phone number patient can be reached at: Home number on file [phone] (home)    Best Time: any     Call taken on 10/3/2018 at 8:44 AM by Karoline Sosa

## 2018-10-16 ENCOUNTER — OFFICE VISIT (OUTPATIENT)
Dept: FAMILY MEDICINE | Facility: CLINIC | Age: 58
End: 2018-10-16
Payer: COMMERCIAL

## 2018-10-16 VITALS
TEMPERATURE: 96.3 F | WEIGHT: 223 LBS | RESPIRATION RATE: 12 BRPM | OXYGEN SATURATION: 95 % | HEART RATE: 78 BPM | SYSTOLIC BLOOD PRESSURE: 126 MMHG | DIASTOLIC BLOOD PRESSURE: 80 MMHG | BODY MASS INDEX: 33.03 KG/M2

## 2018-10-16 DIAGNOSIS — Z11.59 NEED FOR HEPATITIS C SCREENING TEST: ICD-10-CM

## 2018-10-16 DIAGNOSIS — I10 ESSENTIAL HYPERTENSION: ICD-10-CM

## 2018-10-16 DIAGNOSIS — E78.5 HYPERLIPIDEMIA LDL GOAL <130: Primary | ICD-10-CM

## 2018-10-16 DIAGNOSIS — Z12.11 SCREEN FOR COLON CANCER: ICD-10-CM

## 2018-10-16 DIAGNOSIS — J45.40 MODERATE PERSISTENT ASTHMA, UNCOMPLICATED: Chronic | ICD-10-CM

## 2018-10-16 PROCEDURE — 99214 OFFICE O/P EST MOD 30 MIN: CPT | Performed by: INTERNAL MEDICINE

## 2018-10-16 RX ORDER — ATORVASTATIN CALCIUM 10 MG/1
10 TABLET, FILM COATED ORAL DAILY
Qty: 90 TABLET | Refills: 3 | Status: SHIPPED | OUTPATIENT
Start: 2018-10-16 | End: 2019-10-30

## 2018-10-16 RX ORDER — LISINOPRIL/HYDROCHLOROTHIAZIDE 10-12.5 MG
1 TABLET ORAL DAILY
Qty: 90 TABLET | Refills: 3 | Status: SHIPPED | OUTPATIENT
Start: 2018-10-16 | End: 2019-10-30

## 2018-10-16 NOTE — PATIENT INSTRUCTIONS
1. Consider probiotic.  2. If diarrhea persists, check in with Dr. FRIAS about alterative med.  3. You are due for a colonoscopy.  Please call [phone] to schedule  4. Return to clinic 1 year, sooner if needed. Fasting labs to be done then.

## 2018-10-16 NOTE — PROGRESS NOTES
SUBJECTIVE:   Markell Ramsey is a 58 year old male who presents to clinic today for the following health issues:        Hyperlipidemia Follow-Up      Rate your low fat/cholesterol diet?: poor    Taking statin?  Yes, no muscle aches from statin    Other lipid medications/supplements?:  none    Hypertension Follow-up      Outpatient blood pressures are not being checked.    Low Salt Diet: no added salt    Amount of exercise or physical activity: None    Problems taking medications regularly: No    Medication side effects: none    Diet: low salt      IgM def:  Allergist has him on 3 x week azithromycin.  He has taken this multiple times in the past and tolerated well.  A few weeks ago, he started noting diarrhea on the days he takes the med.  He has chronic diarrhea since childhood.  The new side effects are fecal urgency.      Current Outpatient Prescriptions   Medication Sig Dispense Refill     atorvastatin (LIPITOR) 10 MG tablet TAKE 1 TABLET BY MOUTH DAILY 30 tablet 0     azelastine (ASTELIN) 0.1 % nasal spray Spray 1 spray into both nostrils 2 times daily 30 mL 0     azithromycin (ZITHROMAX) 250 MG tablet Take one tablet by mouth once daily on Mondays, Wednesdays and Fridays. 30 tablet 2     beclomethasone HFA (QVAR REDIHALER) 80 MCG/ACT inhaler Inhale 2 puffs into the lungs 3 times daily In the yellow and red zones only of asthma action plan 3 Inhaler 0     ESOMEPRAZOLE MAGNESIUM PO Take 20 mg by mouth every morning (before breakfast)       flunisolide (NASALIDE) 25 MCG/ACT (0.025%) SOLN spray Spray 2 sprays into both nostrils 2 times daily 1 Bottle 3     fluticasone-vilanterol (BREO ELLIPTA) 100-25 MCG/INH inhaler Inhale 1 puff into the lungs daily 1 Inhaler 3     lisinopril-hydrochlorothiazide (PRINZIDE/ZESTORETIC) 10-12.5 MG per tablet TAKE 1 TABLET BY MOUTH EVERY DAY 14 tablet 0     montelukast (SINGULAIR) 10 MG tablet Take 1 tablet (10 mg) by mouth At Bedtime 90 tablet 3     albuterol (2.5 MG/3ML) 0.083%  nebulizer solution Take 1 vial (2.5 mg) by nebulization every 4 hours as needed for shortness of breath / dyspnea (Patient not taking: Reported on 10/16/2018) 30 vial 11     albuterol (VENTOLIN HFA) 108 (90 BASE) MCG/ACT Inhaler Inhale 2 puffs into the lungs every 4 hours as needed Take for shortness of breath, cough or wheeze. Take before exercise. (Patient not taking: Reported on 10/16/2018) 1 Inhaler 11     ipratropium - albuterol 0.5 mg/2.5 mg/3 mL (DUONEB) 0.5-2.5 (3) MG/3ML neb solution Take 1 vial (3 mLs) by nebulization every 6 hours as needed for shortness of breath / dyspnea or wheezing (Patient not taking: Reported on 10/16/2018) 30 vial 1     Respiratory Therapy Supplies (NEBULIZER/TUBING/MOUTHPIECE) KIT use with nebulizer (Patient not taking: Reported on 10/16/2018) 1 kit 11       Reviewed and updated as needed this visit by clinical staff  Tobacco  Allergies  Meds  Problems  Med Hx  Surg Hx  Fam Hx  Soc Hx        Reviewed and updated as needed this visit by Provider  Allergies  Meds  Problems         ROS:  Constitutional, HEENT, cardiovascular, pulmonary, gi and gu systems are negative, except as otherwise noted.    OBJECTIVE:     /80  Pulse 78  Temp 96.3  F (35.7  C) (Tympanic)  Resp 12  Wt 223 lb (101.2 kg)  SpO2 95%  BMI 33.03 kg/m2  Body mass index is 33.03 kg/(m^2).  GENERAL APPEARANCE: healthy, alert and no distress  RESP: lungs clear to auscultation - no rales, rhonchi or wheezes  CV: regular rates and rhythm, normal S1 S2, no S3 or S4 and no murmur, click or rub      ASSESSMENT/PLAN:       ICD-10-CM    1. Hyperlipidemia LDL goal <130 E78.5 atorvastatin (LIPITOR) 10 MG tablet     Lipid panel reflex to direct LDL Fasting   2. Essential hypertension I10 lisinopril-hydrochlorothiazide (PRINZIDE/ZESTORETIC) 10-12.5 MG per tablet     **Basic metabolic panel FUTURE 1yr   3. Screen for colon cancer Z12.11 GASTROENTEROLOGY ADULT REF PROCEDURE ONLY [facility] [phone]   4.  Need for hepatitis C screening test Z11.59 Hepatitis C Screen Reflex to HCV RNA Quant and Genotype   5. Moderate persistent asthma, uncomplicated J45.40        Patient Instructions   1. Consider probiotic.  2. If diarrhea persists, check in with Dr. FRIAS about alterative med.  3. You are due for a colonoscopy.  Please call [phone] to schedule  4. Return to clinic 1 year, sooner if needed. Fasting labs to be done then.        Mary Lezama, DO  [facility]

## 2018-10-16 NOTE — MR AVS SNAPSHOT
After Visit Summary   10/16/2018    Markell Ramsey    MRN: [id number]           Patient Information     Date Of Birth          1960        Visit Information        Provider Department      10/16/2018 7:20 AM Mary Lezama,  [facility]        Today's Diagnoses     Moderate persistent asthma, uncomplicated    -  1    Hyperlipidemia LDL goal <130        Essential hypertension        Screen for colon cancer        Need for hepatitis C screening test          Care Instructions    1. Consider probiotic.  2. If diarrhea persists, check in with Dr. FRIAS about alterative med.  3. You are due for a colonoscopy.  Please call [phone] to schedule  4. Return to clinic 1 year, sooner if needed. Fasting labs to be done then.            Follow-ups after your visit        Additional Services     GASTROENTEROLOGY ADULT REF PROCEDURE ONLY [facility] [phone]                 Follow-up notes from your care team     Return in about 1 year (around 10/16/2019) for Lab Work, Physical Exam.      Your next 10 appointments already scheduled     Oct 19, 2018 10:00 AM CDT   Return Visit with Jose Angel Leiva MD   [facility] ([facility])    [address] [address] [phone]              Future tests that were ordered for you today     Open Future Orders        Priority Expected Expires Ordered    Hepatitis C Screen Reflex to HCV RNA Quant and Genotype Routine  10/16/2019 10/16/2018    **Basic metabolic panel FUTURE 1yr Routine 9/16/2019 10/16/2019 10/16/2018    Lipid panel reflex to direct LDL Fasting Routine 9/16/2019 10/16/2019 10/16/2018            Who to contact     If you have questions or need follow up information about today's clinic visit or your schedule please contact [facility] directly at [phone].  Normal or non-critical lab and imaging results will be communicated to you by MyChart, letter or phone within 4  business days after the clinic has received the results. If you do not hear from us within 7 days, please contact the clinic through [employer] or phone. If you have a critical or abnormal lab result, we will notify you by phone as soon as possible.  Submit refill requests through [employer] or call your pharmacy and they will forward the refill request to us. Please allow 3 business days for your refill to be completed.          Additional Information About Your Visit        [employer] Information     [employer] gives you secure access to your electronic health record. If you see a primary care provider, you can also send messages to your care team and make appointments. If you have questions, please call your primary care clinic.  If you do not have a primary care provider, please call [phone] and they will assist you.        Care EveryWhere ID     This is your Care EveryWhere ID. This could be used by other organizations to access your [facility] medical records  [id number]        Your Vitals Were     Pulse Temperature Respirations Pulse Oximetry BMI (Body Mass Index)       78 96.3  F (35.7  C) (Tympanic) 12 95% 33.03 kg/m2        Blood Pressure from Last 3 Encounters:   10/16/18 126/80   09/06/18 117/73   07/23/18 124/70    Weight from Last 3 Encounters:   10/16/18 223 lb (101.2 kg)   09/06/18 227 lb 8.2 oz (103.2 kg)   07/23/18 232 lb 5.8 oz (105.4 kg)              We Performed the Following     GASTROENTEROLOGY ADULT REF PROCEDURE ONLY [facility] [phone]          Today's Medication Changes          These changes are accurate as of 10/16/18  7:37 AM.  If you have any questions, ask your nurse or doctor.               These medicines have changed or have updated prescriptions.        Dose/Directions    atorvastatin 10 MG tablet   Commonly known as:  LIPITOR   This may have changed:  See the new instructions.   Used for:  Hyperlipidemia LDL goal <130   Changed by:  Mary Lezama DO        Dose:  10 mg    Take 1 tablet (10 mg) by mouth daily   Quantity:  90 tablet   Refills:  3       lisinopril-hydrochlorothiazide 10-12.5 MG per tablet   Commonly known as:  PRINZIDE/ZESTORETIC   This may have changed:  See the new instructions.   Used for:  Essential hypertension   Changed by:  Mary Lezama DO        Dose:  1 tablet   Take 1 tablet by mouth daily   Quantity:  90 tablet   Refills:  3         Stop taking these medicines if you haven't already. Please contact your care team if you have questions.     omeprazole 20 MG CR capsule   Commonly known as:  priLOSEC   Stopped by:  Mary Lezama DO                Where to get your medicines      These medications were sent to [place] [address] [place] [address] [address]     Phone:  [phone]     atorvastatin 10 MG tablet    lisinopril-hydrochlorothiazide 10-12.5 MG per tablet                Primary Care Provider Office Phone # Fax #    Mary Lezama [phone] [address] [address]        Equal Access to Services     [facility] : Hadii aad ku hadasho Soomaali, waaxda luqadaha, qaybta kaalmada adeegyada, waxay josé miguel franks. So [facility] [phone].    ATENCIÓN: Si habla español, tiene a gates disposición servicios gratuitos de asistencia lingüística. Huan al [phone].    We comply with applicable federal civil rights laws and [place] laws. We do not discriminate on the basis of race, color, national origin, age, disability, sex, sexual orientation, or gender identity.            Thank you!     Thank you for choosing [facility]  for your care. Our goal is always to provide you with excellent care. Hearing back from our patients is one way we can continue to improve our services. Please take a few minutes to complete the written survey that you may receive in the mail after your visit with us. Thank you!             Your  Updated Medication List - Protect others around you: Learn how to safely use, store and throw away your medicines at www.disposemymeds.org.          This list is accurate as of 10/16/18  7:37 AM.  Always use your most recent med list.                   Brand Name Dispense Instructions for use Diagnosis    * albuterol (2.5 MG/3ML) 0.083% neb solution     30 vial    Take 1 vial (2.5 mg) by nebulization every 4 hours as needed for shortness of breath / dyspnea    Moderate persistent asthma, uncomplicated       * albuterol 108 (90 Base) MCG/ACT inhaler    VENTOLIN HFA    1 Inhaler    Inhale 2 puffs into the lungs every 4 hours as needed Take for shortness of breath, cough or wheeze. Take before exercise.    Moderate persistent asthma, uncomplicated       atorvastatin 10 MG tablet    LIPITOR    90 tablet    Take 1 tablet (10 mg) by mouth daily    Hyperlipidemia LDL goal <130       azelastine 0.1 % nasal spray    ASTELIN    30 mL    Spray 1 spray into both nostrils 2 times daily    Chronic rhinitis       azithromycin 250 MG tablet    ZITHROMAX    30 tablet    Take one tablet by mouth once daily on Mondays, Wednesdays and Fridays.    IgM deficiency (H)       beclomethasone HFA 80 MCG/ACT inhaler    QVAR REDIHALER    3 Inhaler    Inhale 2 puffs into the lungs 3 times daily In the yellow and red zones only of asthma action plan    Moderate persistent asthma, uncomplicated       ESOMEPRAZOLE MAGNESIUM PO      Take 20 mg by mouth every morning (before breakfast)        flunisolide 25 MCG/ACT (0.025%) Soln spray    NASALIDE    1 Bottle    Spray 2 sprays into both nostrils 2 times daily    Chronic seasonal allergic rhinitis due to pollen       fluticasone-vilanterol 100-25 MCG/INH inhaler    BREO ELLIPTA    1 Inhaler    Inhale 1 puff into the lungs daily    Moderate persistent asthma, uncomplicated       ipratropium - albuterol 0.5 mg/2.5 mg/3 mL 0.5-2.5 (3) MG/3ML neb solution    DUONEB    30 vial    Take 1 vial (3 mLs) by  nebulization every 6 hours as needed for shortness of breath / dyspnea or wheezing    Not well controlled moderate persistent asthma       lisinopril-hydrochlorothiazide 10-12.5 MG per tablet    PRINZIDE/ZESTORETIC    90 tablet    Take 1 tablet by mouth daily    Essential hypertension       montelukast 10 MG tablet    SINGULAIR    90 tablet    Take 1 tablet (10 mg) by mouth At Bedtime    Moderate persistent asthma, uncomplicated       nebulizer/tubing/mouthpiece Kit     1 kit    use with nebulizer    Moderate persistent asthma, uncomplicated       * Notice:  This list has 2 medication(s) that are the same as other medications prescribed for you. Read the directions carefully, and ask your doctor or other care provider to review them with you.

## 2018-10-19 ENCOUNTER — OFFICE VISIT (OUTPATIENT)
Dept: ALLERGY | Facility: CLINIC | Age: 58
End: 2018-10-19
Payer: COMMERCIAL

## 2018-10-19 VITALS
DIASTOLIC BLOOD PRESSURE: 66 MMHG | SYSTOLIC BLOOD PRESSURE: 116 MMHG | OXYGEN SATURATION: 95 % | WEIGHT: 230 LBS | RESPIRATION RATE: 16 BRPM | HEART RATE: 83 BPM | TEMPERATURE: 97.2 F | BODY MASS INDEX: 34.07 KG/M2

## 2018-10-19 DIAGNOSIS — D80.4 IGM DEFICIENCY (H): Chronic | ICD-10-CM

## 2018-10-19 DIAGNOSIS — J45.40 MODERATE PERSISTENT ASTHMA, UNCOMPLICATED: Primary | Chronic | ICD-10-CM

## 2018-10-19 DIAGNOSIS — J31.0 CHRONIC RHINITIS: ICD-10-CM

## 2018-10-19 LAB
FEF 25/75: NORMAL
FEV-1: NORMAL
FEV1/FVC: NORMAL
FVC: NORMAL

## 2018-10-19 PROCEDURE — 99214 OFFICE O/P EST MOD 30 MIN: CPT | Mod: 25 | Performed by: ALLERGY & IMMUNOLOGY

## 2018-10-19 PROCEDURE — 94010 BREATHING CAPACITY TEST: CPT | Performed by: ALLERGY & IMMUNOLOGY

## 2018-10-19 RX ORDER — AZITHROMYCIN 250 MG/1
TABLET, FILM COATED ORAL
Qty: 12 TABLET | Refills: 1 | Status: SHIPPED | OUTPATIENT
Start: 2018-10-19 | End: 2019-10-30

## 2018-10-19 ASSESSMENT — ENCOUNTER SYMPTOMS
NAUSEA: 0
COUGH: 0
FEVER: 0
SHORTNESS OF BREATH: 0
RHINORRHEA: 0
FACIAL SWELLING: 0
DIARRHEA: 0
HEADACHES: 0
FATIGUE: 0
ADENOPATHY: 0
VOMITING: 0
EYE REDNESS: 0
CHEST TIGHTNESS: 0
JOINT SWELLING: 0
ARTHRALGIAS: 0
SINUS PRESSURE: 0
EYE ITCHING: 0
EYE DISCHARGE: 0
MYALGIAS: 0
ACTIVITY CHANGE: 0
WHEEZING: 0

## 2018-10-19 NOTE — LETTER
10/19/2018         RE: Markell Ramsey  [address] [address]        Dear Colleague,    Thank you for referring your patient, Markell Ramsey, to the [facility]. Please see a copy of my visit note below.    SUBJECTIVE:                                                                 Markell Ramsey presents today to our Allergy Clinic at [facility]  for a follow up visit.    As you know, he is a 58-year-old male with moderate persistent asthma, allergic rhinitis, recurrent sinus infection/bronchitis and decreased IgM.      November 2017:Normal CH 50. Normal total IgE.  Normal total IgG.  Normal IgG subclasses.   Normal total IgA level. Decreased IgM level on multiple occasions. Normal T and B cells (flow cytometry).  20/22 protective pneumococcal antibody levels. Protective tetanus antibody level.       SPT in 2007 was positive to molds, tree pollen, grass mix, and ragweed. IDs were positive for cat, roaches,  dog, mite. Aspergillus mold, and marsh elder. Started by Dr. Whittington on allergen IT in 2007,  [facility] Whittington used to practice before. He received allergen immunotherapy on and off until 2010, which he found partially helpful.       Sinus CT in 2013, with mild-moderate sinus disease, and DNS on the left. The patient confirms nasal congestion L>R.      IMPRESSION:  Mild/moderate mucosal thickening in the paranasal   sinuses as described above.  Slight worsening of the right maxillary  sinus and worsening of the right frontal sinus since the previous  exam, but clearing of the left sphenoid sinus.    Regarding his asthma, he has been  7/7 days compliant with his controller medications (Breo 100/25 mcg 1 puff once daily and montelukast 10 mg by mouth once daily). In September, he had a viral infection. He was in the Yellow Zone for 1 week and used QVAR as an add-on. Recovered without the steroids.  Markell is using albuterol HFA  less than  twice per week for rescue from chest symptoms. Last use was 3 weeks ago. He is waking up less than twice per month due to chest symptoms.  There has been no use of oral steroids since the last visit. No ED/PCP/urgent care/other specialist visits for asthma flare since the last visit. The patient denies current chest tightness, cough, wheeze or SOB.   Rhinitis is relatively well controlled with flunisolide 1-2 sprays/nostril twice daily, azelastine as needed and montelukast 10 mg by mouth daily. Was sick with a viral infection last month, and had some flare in the nasal symptoms, but he recovered.   Had some mild symptoms several days ago. Has intermittent epistaxis. Most of the episodes are short lasting, but some may last up to 30-40 mins. The patient denies clear rhinorrhea, nasal itch, stuffiness, sneezing or interval sinusitis symptoms of fever, facial pain or purulent rhinorrhea.  He used azithromycin several times recently, and it caused diarrhea 6 hours later on several occasions. He stopped it, and diarrhea stopped as well. He admits, he didn't have diarrhea every time.  It was more an annoyance for him.  Patient Active Problem List   Diagnosis     Gastroesophageal reflux disease without esophagitis     Non morbid obesity due to excess calories     CARDIOVASCULAR SCREENING; LDL GOAL LESS THAN 160     IgM deficiency (H)     Moderate persistent asthma, uncomplicated     Essential hypertension     Hyperlipidemia LDL goal <130     DNS (deviated nasal septum)     Other chronic rhinitis       Past Medical History:   Diagnosis Date     Acute bronchitis      Allergic rhinitis, cause unspecified 9/23/2008     Allergic rhinitis, unspecified allergic rhinitis type 9/23/2008     Problem list name updated by automated process. Provider to review     Esophageal reflux      Pneumonia 2/24/2013    MRSA, required hospitalization     Unspecified asthma(493.90)       Problem (# of Occurrences) Relation (Name,Age of Onset)     Allergies (1) Mother: asthma    Cancer (1) Father: lung    Respiratory (3) Sister: asthma, Son (Luke): Upper Resp infections, Son (David): asthma        Past Surgical History:   Procedure Laterality Date     C APPENDECTOMY  08/2006     SURGICAL HISTORY OF -   2/23/06    L4-5 microdiscectomy     TONSILLECTOMY & ADENOIDECTOMY       Social History     Social History     Marital status:      Spouse name: N/A     Number of children: N/A     Years of education: N/A     Social History Main Topics     Smoking status: Never Smoker     Smokeless tobacco: Never Used     Alcohol use Yes      Comment: rare     Drug use: No     Sexual activity: Yes     Partners: Female     Other Topics Concern     None     Social History Narrative    November 3, 2017        ENVIRONMENTAL HISTORY: [place] The home is heated with a forced air and gas furnace. They does have central air conditioning. The patient's bedroom is furnished with hard naseem in bedroom, allergen mattress cover and allergen pillowcase cover. No pets inside the house. There is no history of cockroach or mice infestation. There are no smokers in the house.  The house does not have a damp basement.            Review of Systems   Constitutional: Negative for activity change, fatigue and fever.   HENT: Positive for postnasal drip. Negative for congestion, dental problem, ear pain, facial swelling, nosebleeds, rhinorrhea, sinus pressure and sneezing.    Eyes: Negative for discharge, redness and itching.   Respiratory: Negative for cough, chest tightness, shortness of breath and wheezing.    Cardiovascular: Negative for chest pain.   Gastrointestinal: Negative for diarrhea, nausea and vomiting.   Musculoskeletal: Negative for arthralgias, joint swelling and myalgias.   Skin: Negative for rash.   Neurological: Negative for headaches.   Hematological: Negative for adenopathy.   Psychiatric/Behavioral: Negative for behavioral  problems and self-injury.         Current Outpatient Prescriptions:      atorvastatin (LIPITOR) 10 MG tablet, Take 1 tablet (10 mg) by mouth daily, Disp: 90 tablet, Rfl: 3     azelastine (ASTELIN) 0.1 % nasal spray, Spray 1 spray into both nostrils 2 times daily, Disp: 30 mL, Rfl: 0     azithromycin (ZITHROMAX) 250 MG tablet, Two tablets once a day every Saturday., Disp: 12 tablet, Rfl: 1     beclomethasone HFA (QVAR REDIHALER) 80 MCG/ACT inhaler, Inhale 2 puffs into the lungs 3 times daily In the yellow and red zones only of asthma action plan, Disp: 3 Inhaler, Rfl: 0     ESOMEPRAZOLE MAGNESIUM PO, Take 20 mg by mouth every morning (before breakfast), Disp: , Rfl:      flunisolide (NASALIDE) 25 MCG/ACT (0.025%) SOLN spray, Spray 2 sprays into both nostrils 2 times daily, Disp: 1 Bottle, Rfl: 3     fluticasone-vilanterol (BREO ELLIPTA) 100-25 MCG/INH inhaler, Inhale 1 puff into the lungs daily, Disp: 3 Inhaler, Rfl: 1     lisinopril-hydrochlorothiazide (PRINZIDE/ZESTORETIC) 10-12.5 MG per tablet, Take 1 tablet by mouth daily, Disp: 90 tablet, Rfl: 3     montelukast (SINGULAIR) 10 MG tablet, Take 1 tablet (10 mg) by mouth At Bedtime, Disp: 90 tablet, Rfl: 3     albuterol (2.5 MG/3ML) 0.083% nebulizer solution, Take 1 vial (2.5 mg) by nebulization every 4 hours as needed for shortness of breath / dyspnea (Patient not taking: Reported on 10/16/2018), Disp: 30 vial, Rfl: 11     albuterol (VENTOLIN HFA) 108 (90 BASE) MCG/ACT Inhaler, Inhale 2 puffs into the lungs every 4 hours as needed Take for shortness of breath, cough or wheeze. Take before exercise. (Patient not taking: Reported on 10/16/2018), Disp: 1 Inhaler, Rfl: 11     ipratropium - albuterol 0.5 mg/2.5 mg/3 mL (DUONEB) 0.5-2.5 (3) MG/3ML neb solution, Take 1 vial (3 mLs) by nebulization every 6 hours as needed for shortness of breath / dyspnea or wheezing (Patient not taking: Reported on 10/16/2018), Disp: 30 vial, Rfl: 1     Respiratory Therapy Supplies  (NEBULIZER/TUBING/MOUTHPIECE) KIT, use with nebulizer (Patient not taking: Reported on 10/16/2018), Disp: 1 kit, Rfl: 11  Immunization History   Administered Date(s) Administered     Influenza (H1N1) 01/11/2010     Influenza (IIV3) PF 10/29/2008, 08/26/2009, 10/05/2010     Influenza Vaccine IM 3yrs+ 4 Valent IIV4 10/23/2015, 09/21/2017, 09/06/2018     Pneumococcal 23 valent 01/29/2016     TDAP Vaccine (Adacel) 11/18/2008     No Known Allergies  OBJECTIVE:                                                                 /66 (BP Location: Left arm, Patient Position: Sitting, Cuff Size: Adult Large)  Pulse 83  Temp 97.2  F (36.2  C) (Oral)  Resp 16  Wt 104.3 kg (230 lb)  SpO2 95%  BMI 34.07 kg/m2        Physical Exam   Constitutional: No distress.   obese   HENT:   Head: Normocephalic and atraumatic.   Right Ear: Tympanic membrane, external ear and ear canal normal.   Left Ear: Tympanic membrane, external ear and ear canal normal.   Nose: Septal deviation (Septal spur on the left noted.) present. No mucosal edema or rhinorrhea.   Mouth/Throat: Oropharynx is clear and moist and mucous membranes are normal. No oropharyngeal exudate, posterior oropharyngeal edema or posterior oropharyngeal erythema.   Eyes: Conjunctivae are normal. Right eye exhibits no discharge. Left eye exhibits no discharge.   Neck: Normal range of motion.   Cardiovascular: Normal rate, regular rhythm and normal heart sounds.    No murmur heard.  Pulmonary/Chest: Effort normal. No respiratory distress. He has no wheezes. He has no rales.        Musculoskeletal: Normal range of motion.   Neurological: He is alert.   Skin: Skin is warm. He is not diaphoretic.   Psychiatric: Affect normal.   Nursing note and vitals reviewed.    WORKUP:   SPIROMETRY       FVC 4.06L (86% of predicted).     FEV1 3.31L (92% of predicted).     FEV1/FVC 82%     FEF 25%-75%  3.40L/s (111% of predicted)    The office spirometry performed today doesn't suggest an  obstruction.      Asthma Control Test (ACT) total score: 22       ASSESSMENT/PLAN:      Problem List Items Addressed This Visit        Respiratory    1. Moderate persistent asthma, uncomplicated - Primary (Chronic)  Currently well controlled with Breo Ellipta 100/25 mcg 1 puff once daily, montelukast 10 mg by mouth once daily and albuterol inhaler as needed.  -Continue as is.   Consider further stepdown in Spring.so far, the patient is not interested.    Relevant Medications    fluticasone-vilanterol (BREO ELLIPTA) 100-25 MCG/INH inhaler    Other Relevant Orders    Spirometry, Breathing Capacity (Completed)       Immune    2. IgM deficiency (H) (Chronic)  He is prone to bronchitis and sinus infections in cold seasons.  Developed diarrhea after taking azithromycin 3 times a week.  Symptoms were not all the time.  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  -Recommend a trial of azithromycin 500 mg by mouth once daily on Saturdays.  If he continues having symptoms, consider either switching to Bactrim, or a rotating coverage with different antibiotics.    Relevant Medications        azithromycin (ZITHROMAX) 250 MG tablet      Other Visit Diagnoses     3. Chronic rhinitis    Currently well controlled with flunisolide 1-2 sprays in each nostril twice daily, azelastine as needed and montelukast 10 mg by mouth daily.  -Continue as is.         Return in about 6 months (around 4/19/2019), or if symptoms worsen or fail to improve.    Thank you for allowing us to participate in the care of this patient. Please feel free to contact us if there are any questions or concerns about the patient.    Disclaimer: This note consists of symbols derived from keyboarding, dictation and/or voice recognition software. As a result, there may be errors in the script that have gone undetected. Please consider this when interpreting information found in this chart.    Jose Angel Leiva MD,  KEO  [facility]  [place], [place] and [facility]      Again, thank you for allowing me to participate in the care of your patient.        Sincerely,        Jose Angel Leiva MD

## 2018-10-19 NOTE — MR AVS SNAPSHOT
After Visit Summary   10/19/2018    Markell Ramsey    MRN: [id number]           Patient Information     Date Of Birth          1960        Visit Information        Provider Department      10/19/2018 10:00 AM Jose Angel Leiva MD [facility]        Today's Diagnoses     Moderate persistent asthma, uncomplicated    -  1    IgM deficiency (H)        Chronic rhinitis           Follow-ups after your visit        Follow-up notes from your care team     Return in about 6 months (around 4/19/2019), or if symptoms worsen or fail to improve.      Who to contact     If you have questions or need follow up information about today's clinic visit or your schedule please contact [facility] directly at [phone].  Normal or non-critical lab and imaging results will be communicated to you by MyChart, letter or phone within 4 business days after the clinic has received the results. If you do not hear from us within 7 days, please contact the clinic through [employer] or phone. If you have a critical or abnormal lab result, we will notify you by phone as soon as possible.  Submit refill requests through [employer] or call your pharmacy and they will forward the refill request to us. Please allow 3 business days for your refill to be completed.          Additional Information About Your Visit        MyChart Information     [employer] gives you secure access to your electronic health record. If you see a primary care provider, you can also send messages to your care team and make appointments. If you have questions, please call your primary care clinic.  If you do not have a primary care provider, please call [phone] and they will assist you.        Care EveryWhere ID     This is your Care EveryWhere ID. This could be used by other organizations to access your [facility] medical records  [id number]        Your Vitals Were     Pulse Temperature Respirations Pulse Oximetry BMI (Body Mass Index)        83 97.2  F (36.2  C) (Oral) 16 95% 34.07 kg/m2        Blood Pressure from Last 3 Encounters:   10/19/18 116/66   10/16/18 126/80   09/06/18 117/73    Weight from Last 3 Encounters:   10/19/18 104.3 kg (230 lb)   10/16/18 101.2 kg (223 lb)   09/06/18 103.2 kg (227 lb 8.2 oz)              We Performed the Following     Spirometry, Breathing Capacity          Today's Medication Changes          These changes are accurate as of 10/19/18  4:18 PM.  If you have any questions, ask your nurse or doctor.               These medicines have changed or have updated prescriptions.        Dose/Directions    azithromycin 250 MG tablet   Commonly known as:  ZITHROMAX   This may have changed:  additional instructions   Used for:  IgM deficiency (H)   Changed by:  Jose Angel Leiva MD        Two tablets once a day every Saturday.   Quantity:  12 tablet   Refills:  1            Where to get your medicines      These medications were sent to [place] [address] [place] [id number]     Phone:  [phone]     azithromycin 250 MG tablet    fluticasone-vilanterol 100-25 MCG/INH inhaler                Primary Care Provider Office Phone # Fax #    Mary Simeon DO Te[place] [phone] [address] [facility] [address]        Equal Access to Services     DARY FERNANDEZ AH: Hadii dian portillo hadjoelo Sojuanito, waaxda luqadaha, qaybta kaalmada amber, gustavo franks. So [facility] [phone].    ATENCIÓN: Si habla español, tiene a gates disposición servicios gratuitos de asistencia lingüística. Huan pacheco [phone].    We comply with applicable federal civil rights laws and [place] laws. We do not discriminate on the basis of race, color, national origin, age, disability, sex, sexual orientation, or gender identity.            Thank you!     Thank you for choosing [facility]  for your care. Our goal is always to provide you with  excellent care. Hearing back from our patients is one way we can continue to improve our services. Please take a few minutes to complete the written survey that you may receive in the mail after your visit with us. Thank you!             Your Updated Medication List - Protect others around you: Learn how to safely use, store and throw away your medicines at www.disposemymeds.org.          This list is accurate as of 10/19/18  4:18 PM.  Always use your most recent med list.                   Brand Name Dispense Instructions for use Diagnosis    * albuterol (2.5 MG/3ML) 0.083% neb solution     30 vial    Take 1 vial (2.5 mg) by nebulization every 4 hours as needed for shortness of breath / dyspnea    Moderate persistent asthma, uncomplicated       * albuterol 108 (90 Base) MCG/ACT inhaler    VENTOLIN HFA    1 Inhaler    Inhale 2 puffs into the lungs every 4 hours as needed Take for shortness of breath, cough or wheeze. Take before exercise.    Moderate persistent asthma, uncomplicated       atorvastatin 10 MG tablet    LIPITOR    90 tablet    Take 1 tablet (10 mg) by mouth daily    Hyperlipidemia LDL goal <130       azelastine 0.1 % nasal spray    ASTELIN    30 mL    Spray 1 spray into both nostrils 2 times daily    Chronic rhinitis       azithromycin 250 MG tablet    ZITHROMAX    12 tablet    Two tablets once a day every Saturday.    IgM deficiency (H)       beclomethasone HFA 80 MCG/ACT inhaler    QVAR REDIHALER    3 Inhaler    Inhale 2 puffs into the lungs 3 times daily In the yellow and red zones only of asthma action plan    Moderate persistent asthma, uncomplicated       ESOMEPRAZOLE MAGNESIUM PO      Take 20 mg by mouth every morning (before breakfast)        flunisolide 25 MCG/ACT (0.025%) Soln spray    NASALIDE    1 Bottle    Spray 2 sprays into both nostrils 2 times daily    Chronic seasonal allergic rhinitis due to pollen       fluticasone-vilanterol 100-25 MCG/INH inhaler    BREO ELLIPTA    3 Inhaler     Inhale 1 puff into the lungs daily    Moderate persistent asthma, uncomplicated       ipratropium - albuterol 0.5 mg/2.5 mg/3 mL 0.5-2.5 (3) MG/3ML neb solution    DUONEB    30 vial    Take 1 vial (3 mLs) by nebulization every 6 hours as needed for shortness of breath / dyspnea or wheezing    Not well controlled moderate persistent asthma       lisinopril-hydrochlorothiazide 10-12.5 MG per tablet    PRINZIDE/ZESTORETIC    90 tablet    Take 1 tablet by mouth daily    Essential hypertension       montelukast 10 MG tablet    SINGULAIR    90 tablet    Take 1 tablet (10 mg) by mouth At Bedtime    Moderate persistent asthma, uncomplicated       nebulizer/tubing/mouthpiece Kit     1 kit    use with nebulizer    Moderate persistent asthma, uncomplicated       * Notice:  This list has 2 medication(s) that are the same as other medications prescribed for you. Read the directions carefully, and ask your doctor or other care provider to review them with you.

## 2018-10-19 NOTE — PROGRESS NOTES
SUBJECTIVE:                                                                 Markell Ramsey presents today to our Allergy Clinic at [facility]  for a follow up visit.    As you know, he is a 58-year-old male with moderate persistent asthma, allergic rhinitis, recurrent sinus infection/bronchitis and decreased IgM.      November 2017:Normal CH 50. Normal total IgE.  Normal total IgG.  Normal IgG subclasses.   Normal total IgA level. Decreased IgM level on multiple occasions. Normal T and B cells (flow cytometry).  20/22 protective pneumococcal antibody levels. Protective tetanus antibody level.       SPT in 2007 was positive to molds, tree pollen, grass mix, and ragweed. IDs were positive for cat, roaches,  dog, mite. Aspergillus mold, and marsh elder. Started by Dr. Whittington on allergen IT in 2007,  [facility] Whittington used to practice before. He received allergen immunotherapy on and off until 2010, which he found partially helpful.       Sinus CT in 2013, with mild-moderate sinus disease, and DNS on the left. The patient confirms nasal congestion L>R.      IMPRESSION:  Mild/moderate mucosal thickening in the paranasal   sinuses as described above.  Slight worsening of the right maxillary  sinus and worsening of the right frontal sinus since the previous  exam, but clearing of the left sphenoid sinus.    Regarding his asthma, he has been  7/7 days compliant with his controller medications (Breo 100/25 mcg 1 puff once daily and montelukast 10 mg by mouth once daily). In September, he had a viral infection. He was in the Yellow [place] for 1 week and used QVAR as an add-on. Recovered without the steroids.  Markell is using albuterol HFA  less than twice per week for rescue from chest symptoms. Last use was 3 weeks ago. He is waking up less than twice per month due to chest symptoms.  There has been no use of oral steroids since the last visit. No ED/PCP/urgent care/other  specialist visits for asthma flare since the last visit. The patient denies current chest tightness, cough, wheeze or SOB.   Rhinitis is relatively well controlled with flunisolide 1-2 sprays/nostril twice daily, azelastine as needed and montelukast 10 mg by mouth daily. Was sick with a viral infection last month, and had some flare in the nasal symptoms, but he recovered.   Had some mild symptoms several days ago. Has intermittent epistaxis. Most of the episodes are short lasting, but some may last up to 30-40 mins. The patient denies clear rhinorrhea, nasal itch, stuffiness, sneezing or interval sinusitis symptoms of fever, facial pain or purulent rhinorrhea.  He used azithromycin several times recently, and it caused diarrhea 6 hours later on several occasions. He stopped it, and diarrhea stopped as well. He admits, he didn't have diarrhea every time.  It was more an annoyance for him.  Patient Active Problem List   Diagnosis     Gastroesophageal reflux disease without esophagitis     Non morbid obesity due to excess calories     CARDIOVASCULAR SCREENING; LDL GOAL LESS THAN 160     IgM deficiency (H)     Moderate persistent asthma, uncomplicated     Essential hypertension     Hyperlipidemia LDL goal <130     DNS (deviated nasal septum)     Other chronic rhinitis       Past Medical History:   Diagnosis Date     Acute bronchitis      Allergic rhinitis, cause unspecified 9/23/2008     Allergic rhinitis, unspecified allergic rhinitis type 9/23/2008     Problem list name updated by automated process. Provider to review     Esophageal reflux      Pneumonia 2/24/2013    MRSA, required hospitalization     Unspecified asthma(493.90)       Problem (# of Occurrences) Relation (Name,Age of Onset)    Allergies (1) Mother: asthma    Cancer (1) Father: lung    Respiratory (3) Sister: asthma, Son (Luke): Upper Resp infections, Son (David): asthma        Past Surgical History:   Procedure Laterality Date     C APPENDECTOMY   08/2006     SURGICAL HISTORY OF -   2/23/06    L4-5 microdiscectomy     TONSILLECTOMY & ADENOIDECTOMY       Social History     Social History     Marital status:      Spouse name: N/A     Number of children: N/A     Years of education: N/A     Social History Main Topics     Smoking status: Never Smoker     Smokeless tobacco: Never Used     Alcohol use Yes      Comment: rare     Drug use: No     Sexual activity: Yes     Partners: Female     Other Topics Concern     None     Social History Narrative    November 3, 2017        ENVIRONMENTAL HISTORY: [place] The home is heated with a forced air and gas furnace. They does have central air conditioning. The patient's bedroom is furnished with hard naseem in bedroom, allergen mattress cover and allergen pillowcase cover. No pets inside the house. There is no history of cockroach or mice infestation. There are no smokers in the house.  The house does not have a damp basement.            Review of Systems   Constitutional: Negative for activity change, fatigue and fever.   HENT: Positive for postnasal drip. Negative for congestion, dental problem, ear pain, facial swelling, nosebleeds, rhinorrhea, sinus pressure and sneezing.    Eyes: Negative for discharge, redness and itching.   Respiratory: Negative for cough, chest tightness, shortness of breath and wheezing.    Cardiovascular: Negative for chest pain.   Gastrointestinal: Negative for diarrhea, nausea and vomiting.   Musculoskeletal: Negative for arthralgias, joint swelling and myalgias.   Skin: Negative for rash.   Neurological: Negative for headaches.   Hematological: Negative for adenopathy.   Psychiatric/Behavioral: Negative for behavioral problems and self-injury.         Current Outpatient Prescriptions:      atorvastatin (LIPITOR) 10 MG tablet, Take 1 tablet (10 mg) by mouth daily, Disp: 90 tablet, Rfl: 3     azelastine (ASTELIN) 0.1 % nasal spray, Spray 1  spray into both nostrils 2 times daily, Disp: 30 mL, Rfl: 0     azithromycin (ZITHROMAX) 250 MG tablet, Two tablets once a day every Saturday., Disp: 12 tablet, Rfl: 1     beclomethasone HFA (QVAR REDIHALER) 80 MCG/ACT inhaler, Inhale 2 puffs into the lungs 3 times daily In the yellow and red zones only of asthma action plan, Disp: 3 Inhaler, Rfl: 0     ESOMEPRAZOLE MAGNESIUM PO, Take 20 mg by mouth every morning (before breakfast), Disp: , Rfl:      flunisolide (NASALIDE) 25 MCG/ACT (0.025%) SOLN spray, Spray 2 sprays into both nostrils 2 times daily, Disp: 1 Bottle, Rfl: 3     fluticasone-vilanterol (BREO ELLIPTA) 100-25 MCG/INH inhaler, Inhale 1 puff into the lungs daily, Disp: 3 Inhaler, Rfl: 1     lisinopril-hydrochlorothiazide (PRINZIDE/ZESTORETIC) 10-12.5 MG per tablet, Take 1 tablet by mouth daily, Disp: 90 tablet, Rfl: 3     montelukast (SINGULAIR) 10 MG tablet, Take 1 tablet (10 mg) by mouth At Bedtime, Disp: 90 tablet, Rfl: 3     albuterol (2.5 MG/3ML) 0.083% nebulizer solution, Take 1 vial (2.5 mg) by nebulization every 4 hours as needed for shortness of breath / dyspnea (Patient not taking: Reported on 10/16/2018), Disp: 30 vial, Rfl: 11     albuterol (VENTOLIN HFA) 108 (90 BASE) MCG/ACT Inhaler, Inhale 2 puffs into the lungs every 4 hours as needed Take for shortness of breath, cough or wheeze. Take before exercise. (Patient not taking: Reported on 10/16/2018), Disp: 1 Inhaler, Rfl: 11     ipratropium - albuterol 0.5 mg/2.5 mg/3 mL (DUONEB) 0.5-2.5 (3) MG/3ML neb solution, Take 1 vial (3 mLs) by nebulization every 6 hours as needed for shortness of breath / dyspnea or wheezing (Patient not taking: Reported on 10/16/2018), Disp: 30 vial, Rfl: 1     Respiratory Therapy Supplies (NEBULIZER/TUBING/MOUTHPIECE) KIT, use with nebulizer (Patient not taking: Reported on 10/16/2018), Disp: 1 kit, Rfl: 11  Immunization History   Administered Date(s) Administered     Influenza (H1N1) 01/11/2010     Influenza  (IIV3) PF 10/29/2008, 08/26/2009, 10/05/2010     Influenza Vaccine IM 3yrs+ 4 Valent IIV4 10/23/2015, 09/21/2017, 09/06/2018     Pneumococcal 23 valent 01/29/2016     TDAP Vaccine (Adacel) 11/18/2008     No Known Allergies  OBJECTIVE:                                                                 /66 (BP Location: Left arm, Patient Position: Sitting, Cuff Size: Adult Large)  Pulse 83  Temp 97.2  F (36.2  C) (Oral)  Resp 16  Wt 104.3 kg (230 lb)  SpO2 95%  BMI 34.07 kg/m2        Physical Exam   Constitutional: No distress.   obese   HENT:   Head: Normocephalic and atraumatic.   Right Ear: Tympanic membrane, external ear and ear canal normal.   Left Ear: Tympanic membrane, external ear and ear canal normal.   Nose: Septal deviation (Septal spur on the left noted.) present. No mucosal edema or rhinorrhea.   Mouth/Throat: Oropharynx is clear and moist and mucous membranes are normal. No oropharyngeal exudate, posterior oropharyngeal edema or posterior oropharyngeal erythema.   Eyes: Conjunctivae are normal. Right eye exhibits no discharge. Left eye exhibits no discharge.   Neck: Normal range of motion.   Cardiovascular: Normal rate, regular rhythm and normal heart sounds.    No murmur heard.  Pulmonary/Chest: Effort normal. No respiratory distress. He has no wheezes. He has no rales.        Musculoskeletal: Normal range of motion.   Neurological: He is alert.   Skin: Skin is warm. He is not diaphoretic.   Psychiatric: Affect normal.   Nursing note and vitals reviewed.    WORKUP:   SPIROMETRY       FVC 4.06L (86% of predicted).     FEV1 3.31L (92% of predicted).     FEV1/FVC 82%     FEF 25%-75%  3.40L/s (111% of predicted)    The office spirometry performed today doesn't suggest an obstruction.      Asthma Control Test (ACT) total score: 22       ASSESSMENT/PLAN:      Problem List Items Addressed This Visit        Respiratory    1. Moderate persistent asthma, uncomplicated - Primary (Chronic)  Currently well  controlled with Breo Ellipta 100/25 mcg 1 puff once daily, montelukast 10 mg by mouth once daily and albuterol inhaler as needed.  -Continue as is.  Consider further stepdown in Spring.so far, the patient is not interested.    Relevant Medications    fluticasone-vilanterol (BREO ELLIPTA) 100-25 MCG/INH inhaler    Other Relevant Orders    Spirometry, Breathing Capacity (Completed)       Immune    2. IgM deficiency (H) (Chronic)  He is prone to bronchitis and sinus infections in cold seasons.  Developed diarrhea after taking azithromycin 3 times a week.  Symptoms were not all the time.  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  -Recommend a trial of azithromycin 500 mg by mouth once daily on Saturdays.  If he continues having symptoms, consider either switching to Bactrim, or a rotating coverage with different antibiotics.    Relevant Medications        azithromycin (ZITHROMAX) 250 MG tablet      Other Visit Diagnoses     3. Chronic rhinitis    Currently well controlled with flunisolide 1-2 sprays in each nostril twice daily, azelastine as needed and montelukast 10 mg by mouth daily.  -Continue as is.         Return in about 6 months (around 4/19/2019), or if symptoms worsen or fail to improve.    Thank you for allowing us to participate in the care of this patient. Please feel free to contact us if there are any questions or concerns about the patient.    Disclaimer: This note consists of symbols derived from keyboarding, dictation and/or voice recognition software. As a result, there may be errors in the script that have gone undetected. Please consider this when interpreting information found in this chart.    Jose Angel Leiva MD, FACI  Allergy, Asthma and Immunology  [place], [place] and [place]

## 2018-10-20 ASSESSMENT — ASTHMA QUESTIONNAIRES: ACT_TOTALSCORE: 22

## 2018-10-29 ENCOUNTER — TELEPHONE (OUTPATIENT)
Dept: ALLERGY | Facility: CLINIC | Age: 58
End: 2018-10-29

## 2018-10-29 DIAGNOSIS — J31.0 CHRONIC RHINITIS: ICD-10-CM

## 2018-10-29 RX ORDER — AZELASTINE 1 MG/ML
1 SPRAY, METERED NASAL 2 TIMES DAILY
Qty: 90 ML | Refills: 0 | Status: SHIPPED | OUTPATIENT
Start: 2018-10-29 | End: 2019-01-30

## 2018-10-29 NOTE — TELEPHONE ENCOUNTER
Refilled as requested, 90 day supply. Last office visit 10/18/2018 with 6 month follow up.    Delphine Funez RN

## 2018-10-29 NOTE — TELEPHONE ENCOUNTER
[facility] [place] (Ph: [phone])    Azelastine - Requesting 90-day supply    Denise Behrendt  [facility] CSS

## 2019-01-30 DIAGNOSIS — J31.0 CHRONIC RHINITIS: ICD-10-CM

## 2019-01-30 RX ORDER — AZELASTINE 1 MG/ML
1 SPRAY, METERED NASAL 2 TIMES DAILY
Qty: 90 ML | Refills: 0 | Status: SHIPPED | OUTPATIENT
Start: 2019-01-30 | End: 2019-04-29

## 2019-01-30 NOTE — TELEPHONE ENCOUNTER
Prescription approved per [facility] Refill Protocol.  Patient is due for follow-up 4/19/19  Libertad Pabon RN

## 2019-01-30 NOTE — TELEPHONE ENCOUNTER
Reason for Call:  Medication or medication refill:    Do you use a [facility]?  Name of the pharmacy and phone number for the current request:  [facility] [place] [phone]    Name of the medication requested: azelastine    Other request: LOV:  10/19/18  LAST REFILL:  10/29/18      Can we leave a detailed message on this number? YES    Phone number patient can be reached at: Home number on file [phone] (home)    Best Time: any     Call taken on 1/30/2019 at 9:30 AM by Karoline Sosa

## 2019-03-01 DIAGNOSIS — J30.1 CHRONIC SEASONAL ALLERGIC RHINITIS DUE TO POLLEN: ICD-10-CM

## 2019-03-01 RX ORDER — FLUNISOLIDE 0.25 MG/ML
2 SOLUTION NASAL 2 TIMES DAILY
Qty: 1 BOTTLE | Refills: 1 | Status: SHIPPED | OUTPATIENT
Start: 2019-03-01 | End: 2019-05-24

## 2019-03-01 NOTE — TELEPHONE ENCOUNTER
Reason for Call:  Medication or medication refill:    Do you use a [facility]?  Name of the pharmacy and phone number for the current request:  [facility] [place] [phone]    Name of the medication requested: Flunisolide    Other request:   LAST REFILL: 11/24/2018  LOV: 10/19/2018    Can we leave a detailed message on this number? Not Applicable    Phone number patient can be reached at: Home number on file [phone] (home)    Best Time: NA    Call taken on 3/1/2019 at 1:23 PM by Denise Behrendt

## 2019-04-29 DIAGNOSIS — J31.0 CHRONIC RHINITIS: ICD-10-CM

## 2019-04-29 RX ORDER — AZELASTINE 1 MG/ML
1 SPRAY, METERED NASAL 2 TIMES DAILY
Qty: 90 ML | Refills: 0 | Status: SHIPPED | OUTPATIENT
Start: 2019-04-29 | End: 2019-10-21

## 2019-05-24 DIAGNOSIS — J30.1 CHRONIC SEASONAL ALLERGIC RHINITIS DUE TO POLLEN: ICD-10-CM

## 2019-05-24 RX ORDER — FLUNISOLIDE 0.25 MG/ML
2 SOLUTION NASAL 2 TIMES DAILY
Qty: 1 BOTTLE | Refills: 1 | Status: SHIPPED | OUTPATIENT
Start: 2019-05-24 | End: 2019-06-17

## 2019-06-17 DIAGNOSIS — J30.1 CHRONIC SEASONAL ALLERGIC RHINITIS DUE TO POLLEN: ICD-10-CM

## 2019-06-17 RX ORDER — FLUNISOLIDE 0.25 MG/ML
2 SOLUTION NASAL 2 TIMES DAILY
Qty: 1 BOTTLE | Refills: 1 | Status: SHIPPED | OUTPATIENT
Start: 2019-06-17 | End: 2019-07-15

## 2019-07-15 DIAGNOSIS — J30.1 CHRONIC SEASONAL ALLERGIC RHINITIS DUE TO POLLEN: ICD-10-CM

## 2019-07-15 RX ORDER — FLUNISOLIDE 0.25 MG/ML
2 SOLUTION NASAL 2 TIMES DAILY
Qty: 1 BOTTLE | Refills: 1 | Status: SHIPPED | OUTPATIENT
Start: 2019-07-15 | End: 2019-10-21

## 2019-07-15 NOTE — TELEPHONE ENCOUNTER
Reason for Call:  Medication or medication refill:    Do you use a [facility]?  Name of the pharmacy and phone number for the current request:  [facility] [place] [phone]    Name of the medication requested: Flunisolide    Other request:   LAST REFILL: 06/17/2019  LOV: 10/19/2018    Can we leave a detailed message on this number? Not Applicable    Phone number patient can be reached at: Home number on file [phone] (home)    Best Time: NA    Call taken on 7/15/2019 at 8:36 AM by Denise Behrendt

## 2019-08-28 DIAGNOSIS — J45.40 MODERATE PERSISTENT ASTHMA, UNCOMPLICATED: Chronic | ICD-10-CM

## 2019-08-28 NOTE — TELEPHONE ENCOUNTER
Reason for Call:  Medication or medication refill:    Do you use a [facility]?  Name of the pharmacy and phone number for the current request:  [facility] [place] [phone]    Name of the medication requested: BREO ELLIPTA    Other request: LOV:  10/19/18  LAST REFILL:  4/13/19    Can we leave a detailed message on this number? YES    Phone number patient can be reached at: Home number on file [phone] (home)    Best Time: any     Call taken on 8/28/2019 at 10:57 AM by Karoline Sosa

## 2019-08-29 NOTE — TELEPHONE ENCOUNTER
Last OV with Dr. Leiva was 10/19/18.  6 month f/u appointment advised.      Routing refill request to provider for review/approval because:    Inhaled Steroids Protocol Failed8/28 10:58 AM   Asthma control assessment score within normal limits in last 6 months    Recent (6 mo) or future (30 days) visit within the authorizing provider's specialty     Please advise for refills.  Kristin Petty RN

## 2019-10-14 ENCOUNTER — TELEPHONE (OUTPATIENT)
Dept: FAMILY MEDICINE | Facility: CLINIC | Age: 59
End: 2019-10-14

## 2019-10-14 DIAGNOSIS — E78.5 HYPERLIPIDEMIA LDL GOAL <130: ICD-10-CM

## 2019-10-14 DIAGNOSIS — I10 ESSENTIAL HYPERTENSION: ICD-10-CM

## 2019-10-16 NOTE — TELEPHONE ENCOUNTER
"Left message for patient to return call to clinic.  He is due for OV - been 1 yr.  Please help him schedule appt.  Does he have enough to get appt?  Sarahi MERA RN      Requested Prescriptions   Pending Prescriptions Disp Refills     lisinopril-hydrochlorothiazide (PRINZIDE/ZESTORETIC) 10-12.5 MG tablet [Pharmacy Med Name: LISINOPRIL-HCTZ 10-12.5 MG TAB] 90 tablet 3     Sig: TAKE 1 TABLET BY MOUTH EVERY DAY       Diuretics (Including Combos) Protocol Failed - 10/14/2019  1:34 AM        Failed - Normal serum creatinine on file in past 12 months     Recent Labs   Lab Test 05/02/18  0927   CR 1.13              Failed - Normal serum potassium on file in past 12 months     Recent Labs   Lab Test 05/02/18 0927   POTASSIUM 3.7                    Failed - Normal serum sodium on file in past 12 months     Recent Labs   Lab Test 05/02/18  0927                 Passed - Blood pressure under 140/90 in past 12 months     BP Readings from Last 3 Encounters:   10/19/18 116/66   10/16/18 126/80   09/06/18 117/73                 Passed - Recent (12 mo) or future (30 days) visit within the authorizing provider's specialty     Patient has had an office visit with the authorizing provider or a provider within the authorizing providers department within the previous 12 mos or has a future within next 30 days. See \"Patient Info\" tab in inbasket, or \"Choose Columns\" in Meds & Orders section of the refill encounter.              Passed - Medication is active on med list        Passed - Patient is age 18 or older        atorvastatin (LIPITOR) 10 MG tablet [Pharmacy Med Name: ATORVASTATIN 10 MG TABLET] 90 tablet 3     Sig: TAKE 1 TABLET BY MOUTH EVERY DAY       Statins Protocol Failed - 10/14/2019  1:34 AM        Failed - LDL on file in past 12 months     Recent Labs   Lab Test 05/02/18 0927   *             Passed - No abnormal creatine kinase in past 12 months     No lab results found.             Passed - Recent (12 mo) or " "future (30 days) visit within the authorizing provider's specialty     Patient has had an office visit with the authorizing provider or a provider within the authorizing providers department within the previous 12 mos or has a future within next 30 days. See \"Patient Info\" tab in inbasket, or \"Choose Columns\" in Meds & Orders section of the refill encounter.              Passed - Medication is active on med list        Passed - Patient is age 18 or older          "

## 2019-10-17 RX ORDER — LISINOPRIL/HYDROCHLOROTHIAZIDE 10-12.5 MG
TABLET ORAL
Qty: 90 TABLET | Refills: 3 | OUTPATIENT
Start: 2019-10-17

## 2019-10-17 RX ORDER — ATORVASTATIN CALCIUM 10 MG/1
TABLET, FILM COATED ORAL
Qty: 90 TABLET | Refills: 3 | OUTPATIENT
Start: 2019-10-17

## 2019-10-17 NOTE — TELEPHONE ENCOUNTER
I have attempted to contact this patient by phone with the following results: left message to return my call on answering machine.    Saadia Sorto RN

## 2019-10-17 NOTE — TELEPHONE ENCOUNTER
Helped schedule appointment patient has enough to get to appointment.  Ara Minaya on 10/17/2019 at 2:05 PM

## 2019-10-21 ENCOUNTER — OFFICE VISIT (OUTPATIENT)
Dept: ALLERGY | Facility: CLINIC | Age: 59
End: 2019-10-21
Payer: COMMERCIAL

## 2019-10-21 VITALS
OXYGEN SATURATION: 96 % | WEIGHT: 228.62 LBS | TEMPERATURE: 98.3 F | SYSTOLIC BLOOD PRESSURE: 115 MMHG | HEIGHT: 69 IN | DIASTOLIC BLOOD PRESSURE: 74 MMHG | BODY MASS INDEX: 33.86 KG/M2 | HEART RATE: 85 BPM

## 2019-10-21 DIAGNOSIS — D80.4 IGM DEFICIENCY (H): Chronic | ICD-10-CM

## 2019-10-21 DIAGNOSIS — J31.0 CHRONIC RHINITIS: ICD-10-CM

## 2019-10-21 DIAGNOSIS — J30.1 CHRONIC SEASONAL ALLERGIC RHINITIS DUE TO POLLEN: ICD-10-CM

## 2019-10-21 DIAGNOSIS — J45.40 MODERATE PERSISTENT ASTHMA, UNCOMPLICATED: Primary | Chronic | ICD-10-CM

## 2019-10-21 DIAGNOSIS — Z23 NEED FOR PROPHYLACTIC VACCINATION AND INOCULATION AGAINST INFLUENZA: ICD-10-CM

## 2019-10-21 LAB
FEF 25/75: NORMAL
FEV-1: NORMAL
FEV1/FVC: NORMAL
FVC: NORMAL

## 2019-10-21 PROCEDURE — 99214 OFFICE O/P EST MOD 30 MIN: CPT | Mod: 25 | Performed by: ALLERGY & IMMUNOLOGY

## 2019-10-21 PROCEDURE — 90471 IMMUNIZATION ADMIN: CPT | Performed by: ALLERGY & IMMUNOLOGY

## 2019-10-21 PROCEDURE — 94010 BREATHING CAPACITY TEST: CPT | Performed by: ALLERGY & IMMUNOLOGY

## 2019-10-21 PROCEDURE — 90682 RIV4 VACC RECOMBINANT DNA IM: CPT | Performed by: ALLERGY & IMMUNOLOGY

## 2019-10-21 RX ORDER — AZITHROMYCIN 250 MG/1
TABLET, FILM COATED ORAL
Qty: 36 TABLET | Refills: 3 | Status: SHIPPED | OUTPATIENT
Start: 2019-10-21 | End: 2020-09-18

## 2019-10-21 RX ORDER — MONTELUKAST SODIUM 10 MG/1
10 TABLET ORAL AT BEDTIME
Qty: 90 TABLET | Refills: 3 | Status: SHIPPED | OUTPATIENT
Start: 2019-10-21 | End: 2020-09-29

## 2019-10-21 RX ORDER — FLUNISOLIDE 0.25 MG/ML
2 SOLUTION NASAL 2 TIMES DAILY
Qty: 3 BOTTLE | Refills: 3 | Status: SHIPPED | OUTPATIENT
Start: 2019-10-21 | End: 2020-09-18

## 2019-10-21 RX ORDER — AZELASTINE 1 MG/ML
1 SPRAY, METERED NASAL 2 TIMES DAILY
Qty: 90 ML | Refills: 0 | Status: SHIPPED | OUTPATIENT
Start: 2019-10-21 | End: 2020-01-23

## 2019-10-21 ASSESSMENT — ENCOUNTER SYMPTOMS
ARTHRALGIAS: 0
VOMITING: 0
EYE ITCHING: 0
ACTIVITY CHANGE: 0
HEADACHES: 0
ADENOPATHY: 0
NAUSEA: 0
CHEST TIGHTNESS: 0
EYE REDNESS: 0
FATIGUE: 0
SINUS PRESSURE: 0
EYE DISCHARGE: 0
COUGH: 0
JOINT SWELLING: 0
FEVER: 0
RHINORRHEA: 0
DIARRHEA: 0
WHEEZING: 0
MYALGIAS: 0
SHORTNESS OF BREATH: 0
FACIAL SWELLING: 0

## 2019-10-21 ASSESSMENT — MIFFLIN-ST. JEOR: SCORE: 1853.25

## 2019-10-21 NOTE — LETTER
My Asthma Action Plan  Name: Markell Ramsey   YOB: 1960  Date:10/21/2019    My doctor: Jose Angel Leiva MD   My clinic: [facility]        My Control Medicine: Fluticasone furoate + vilanterol (Breo Ellipta)-  100/25mcg 1 puff once daily AND  Montelukast (Singulair) -  10 mg by mouth once a day  My Rescue Medicine: Albuterol nebulizer solution every 4 hrs as needed  Albuterol (Proair/Ventolin/Proventil) inhaler 2-4 puffs every 4 hrs as needed   My Asthma Severity: moderate persistent  Avoid your asthma triggers: upper respiratory infections               GREEN ZONE   Good Control    I feel good    No cough or wheeze    Can work, sleep and play without asthma symptoms       Take your asthma control medicine every day.     1. If exercise triggers your asthma, take your rescue medication    15 minutes before exercise or sports, and    During exercise if you have asthma symptoms  2. Spacer to use with inhaler: If you have a spacer, make sure to use it with your inhaler             YELLOW ZONE Getting Worse  I have ANY of these:    I do not feel good    Cough or wheeze    Chest feels tight    Wake up at night   1. Keep taking your Green Zone medications, and Start QVAR 80 mcg 2 puffs three times daily.   2. Start taking your rescue medicine:    every 20 minutes for up to 1 hour. Then every 4 hours for 24-48 hours.  3. If you stay in the Yellow Zone for more than 48  hours, contact your doctor.             RED ZONE Medical Alert - Get Help  I have ANY of these:    I feel awful    Medicine is not helping    Breathing getting harder    Trouble walking or talking    Nose opens wide to breathe       1. Take your rescue medicine NOW  2. If your provider has prescribed an oral steroid medicine, start taking it NOW  3. Call your doctor NOW  4. If you are still in the Red Zone after 20 minutes and you have not reached your doctor:    Take your rescue medicine again and    Call 911 or go to the emergency room  right away    See your regular doctor within 2 weeks of an Emergency Room or Urgent Care visit for follow-up treatment.        Electronically signed by: Jose Angel Leiva,10/21/2019              Annual Reminders:  Meet with Asthma Educator,  Flu Shot in the Fall, consider Pneumonia Vaccination for patients with asthma (aged 19 and older).    Pharmacy:    [id number] - [facility] [address] AT [facility]  [facility] [address] [facility]  [id number] - [place], [place] - [address]                    Asthma Triggers  How To Control Things That Make Your Asthma Worse    Triggers are things that make your asthma worse.  Look at the list below to help you find your triggers and what you can do about them.  You can help prevent asthma flare-ups by staying away from your triggers.      Trigger                                                          What you can do   Cigarette Smoke  Tobacco smoke can make asthma worse. Do not allow smoking in your home, car or around you.  Be sure no one smokes at a child s day care or school.  If you smoke, ask your health care provider for ways to help you quit.  Ask family members to quit too.  Ask your health care provider for a referral to Quit Plan to help you quit smoking, or call 9-697-605-PLAN.     Colds, Flu, Bronchitis  These are common triggers of asthma. Wash your hands often.  Don t touch your eyes, nose or mouth.  Get a flu shot every year.     Dust Mites  These are tiny bugs that live in cloth or carpet. They are too small to see. Wash sheets and blankets in hot water every week.   Encase pillows and mattress in dust mite proof covers.  Avoid having carpet if you can. If you have carpet, vacuum weekly.   Use a dust mask and HEPA vacuum.   Pollen and Outdoor Mold  Some people are allergic to trees, grass, or weed pollen, or molds. Try to keep  your windows closed.  Limit time out doors when pollen count is high.   Ask you health care provider about taking medicine during allergy season.     Animal Dander  Some people are allergic to skin flakes, urine or saliva from pets with fur or feathers. Keep pets with fur or feathers out of your home.    If you can t keep the pet outdoors, then keep the pet out of your bedroom.  Keep the bedroom door closed.  Keep pets off cloth furniture and away from stuffed toys.     Mice, Rats, and Cockroaches  Some people are allergic to the waste from these pests.   Cover food and garbage.  Clean up spills and food crumbs.  Store grease in the refrigerator.   Keep food out of the bedroom.   Indoor Mold  This can be a trigger if your home has high moisture. Fix leaking faucets, pipes, or other sources of water.   Clean moldy surfaces.  Dehumidify basement if it is damp and smelly.   Smoke, Strong Odors, and Sprays  These can reduce air quality. Stay away from strong odors and sprays, such as perfume, powder, hair spray, paints, smoke incense, paint, cleaning products, candles and new carpet.   Exercise or Sports  Some people with asthma have this trigger. Be active!  Ask your doctor about taking medicine before sports or exercise to prevent symptoms.    Warm up for 5-10 minutes before and after sports or exercise.     Other Triggers of Asthma  Cold air:  Cover your nose and mouth with a scarf.  Sometimes laughing or crying can be a trigger.  Some medicines and food can trigger asthma.

## 2019-10-21 NOTE — LETTER
10/21/2019         RE: Markell Ramsey  [address] [id number]        Dear Colleague,    Thank you for referring your patient, Markell Ramsey, to the [facility]. Please see a copy of my visit note below.    SUBJECTIVE:                                                               Markell Ramsey presents today to our Allergy Clinic at [facility]  for a follow up visit.    As you know, he is a 59-year-old male with moderate persistent asthma, allergic rhinitis, recurrent sinus infection/bronchitis and decreased IgM.      November 2017:Normal CH 50. Normal total IgE.  Normal total IgG.  Normal IgG subclasses.   Normal total IgA level. Decreased IgM level on multiple occasions. Normal T and B cells (flow cytometry).  20/22 protective pneumococcal antibody levels. Protective tetanus antibody level.       SPT in 2007 was positive to molds, tree pollen, grass mix, and ragweed. IDs were positive for cat, roaches,  dog, mite. Aspergillus mold, and marsh elder. Started by Dr. Whittington on allergen IT in 2007,  [facility] Whittington used to practice before. He received allergen immunotherapy on and off until 2010, which he found partially helpful.       Sinus CT in 2013, with mild-moderate sinus disease, and DNS on the left. The patient confirms nasal congestion L>R.  IMPRESSION:  Mild/moderate mucosal thickening in the paranasal sinuses as described above.  Slight worsening of the right maxillary sinus and worsening of the right frontal sinus since the previous exam, but clearing of the left sphenoid sinus.    Regarding his asthma, he has been  7/7 days compliant with his controller medications (Breo 100/25 mcg 1 puff once daily and montelukast 10 mg by mouth once daily). Markell is using albuterol HFA less than twice per week for rescue from chest symptoms. He uses QVAR 80 mcg 2 puffs three times a day in the Yellow Zone. He used it 4-5 times for the last  year. He is waking up less than twice per month due to chest symptoms.  There has been no use of oral steroids since that visit. No ED/PCP/urgent care/other specialist visits for asthma flare since the last visit. The patient denies current chest tightness, cough, wheeze or shortness of breath.   Asthma triggers remain unchanged. he uses a blue/yellow spacer device, where applicable.  Rhinitis is relatively well controlled with flunisolide 2 sprays/nostril once-twice daily, azelastine 2 sprays/nostril once-twice daily, and montelukast 10 mg by mouth daily. Was sick with a viral infection last month, and had some flare in the nasal symptoms, but he recovered. This exactly the same thing what he told me last time. The patient denies clear rhinorrhea, nasal itch, stuffiness, sneezing or interval sinusitis symptoms of fever, facial pain or purulent rhinorrhea.  He takes azithromycin 250 mg by mouth once daily three times a week, and he is doing well. Frequency of bronchitis and sinus infections has significantly improved. He was skipping it in warm season. Started it again in August.    Patient Active Problem List   Diagnosis     Gastroesophageal reflux disease without esophagitis     Non morbid obesity due to excess calories     CARDIOVASCULAR SCREENING; LDL GOAL LESS THAN 160     IgM deficiency (H)     Moderate persistent asthma, uncomplicated     Essential hypertension     Hyperlipidemia LDL goal <130     DNS (deviated nasal septum)     Other chronic rhinitis       Past Medical History:   Diagnosis Date     Acute bronchitis      Allergic rhinitis, cause unspecified 9/23/2008     Allergic rhinitis, unspecified allergic rhinitis type 9/23/2008     Problem list name updated by automated process. Provider to review     Esophageal reflux      Pneumonia 2/24/2013    MRSA, required hospitalization     Unspecified asthma(493.90)       Problem (# of Occurrences) Relation (Name,Age of Onset)    Allergies (1) Mother: asthma     Cancer (1) Father: lung    Respiratory (3) Sister: asthma, Son (Wisam): Upper Resp infections, Son (David): asthma        Past Surgical History:   Procedure Laterality Date     C APPENDECTOMY  08/2006     SURGICAL HISTORY OF -   2/23/06    L4-5 microdiscectomy     TONSILLECTOMY & ADENOIDECTOMY       Social History     Socioeconomic History     Marital status:      Spouse name: None     Number of children: None     Years of education: None     Highest education level: None   Occupational History     None   Social Needs     Financial resource strain: None     Food insecurity:     Worry: None     Inability: None     Transportation needs:     Medical: None     Non-medical: None   Tobacco Use     Smoking status: Never Smoker     Smokeless tobacco: Never Used   Substance and Sexual Activity     Alcohol use: Yes     Comment: rare     Drug use: No     Sexual activity: Yes     Partners: Female   Lifestyle     Physical activity:     Days per week: None     Minutes per session: None     Stress: None   Relationships     Social connections:     Talks on phone: None     Gets together: None     Attends Confucianist service: None     Active member of club or organization: None     Attends meetings of clubs or organizations: None     Relationship status: None     Intimate partner violence:     Fear of current or ex partner: None     Emotionally abused: None     Physically abused: None     Forced sexual activity: None   Other Topics Concern     Parent/sibling w/ CABG, MI or angioplasty before 65F 55M? Not Asked   Social History Narrative    October 21, 2019    ENVIRONMENTAL HISTORY: [place] The home is heated with a forced air and gas furnace. They does have central air conditioning. The patient's bedroom is furnished with hard naseem in bedroom, allergen mattress cover and allergen pillowcase cover. No pets inside the house. There is no history of cockroach or mice infestation.  There are no smokers in the house.  The house does not have a damp basement.            Review of Systems   Constitutional: Negative for activity change, fatigue and fever.   HENT: Negative for congestion, dental problem, ear pain, facial swelling, nosebleeds, postnasal drip, rhinorrhea, sinus pressure and sneezing.    Eyes: Negative for discharge, redness and itching.   Respiratory: Negative for cough, chest tightness, shortness of breath and wheezing.    Cardiovascular: Negative for chest pain.   Gastrointestinal: Negative for diarrhea, nausea and vomiting.   Musculoskeletal: Negative for arthralgias, joint swelling and myalgias.   Skin: Negative for rash.   Neurological: Negative for headaches.   Hematological: Negative for adenopathy.   Psychiatric/Behavioral: Negative for behavioral problems and self-injury.           Current Outpatient Medications:      atorvastatin (LIPITOR) 10 MG tablet, Take 1 tablet (10 mg) by mouth daily, Disp: 90 tablet, Rfl: 3     azelastine (ASTELIN) 0.1 % nasal spray, Spray 1 spray into both nostrils 2 times daily, Disp: 90 mL, Rfl: 0     azithromycin (ZITHROMAX) 250 MG tablet, Take one tablet by mouth once daily on Mondays, Wednesdays and Fridays., Disp: 36 tablet, Rfl: 3     beclomethasone HFA (QVAR REDIHALER) 80 MCG/ACT inhaler, Inhale 2 puffs into the lungs 3 times daily In the yellow and red zones only of asthma action plan, Disp: 1 Inhaler, Rfl: 0     ESOMEPRAZOLE MAGNESIUM PO, Take 20 mg by mouth every morning (before breakfast), Disp: , Rfl:      flunisolide (NASALIDE) 25 MCG/ACT (0.025%) SOLN spray, Spray 2 sprays into both nostrils 2 times daily, Disp: 3 Bottle, Rfl: 3     fluticasone-vilanterol (BREO ELLIPTA) 100-25 MCG/INH inhaler, Inhale 1 puff into the lungs daily, Disp: 3 Inhaler, Rfl: 3     lisinopril-hydrochlorothiazide (PRINZIDE/ZESTORETIC) 10-12.5 MG per tablet, Take 1 tablet by mouth daily, Disp: 90 tablet, Rfl: 3     montelukast (SINGULAIR) 10 MG tablet, Take 1  "tablet (10 mg) by mouth At Bedtime, Disp: 90 tablet, Rfl: 3     albuterol (2.5 MG/3ML) 0.083% nebulizer solution, Take 1 vial (2.5 mg) by nebulization every 4 hours as needed for shortness of breath / dyspnea (Patient not taking: Reported on 10/16/2018), Disp: 30 vial, Rfl: 11     albuterol (VENTOLIN HFA) 108 (90 BASE) MCG/ACT Inhaler, Inhale 2 puffs into the lungs every 4 hours as needed Take for shortness of breath, cough or wheeze. Take before exercise. (Patient not taking: Reported on 10/16/2018), Disp: 1 Inhaler, Rfl: 11     azithromycin (ZITHROMAX) 250 MG tablet, Two tablets once a day every Saturday. (Patient not taking: Reported on 10/21/2019), Disp: 12 tablet, Rfl: 1     ipratropium - albuterol 0.5 mg/2.5 mg/3 mL (DUONEB) 0.5-2.5 (3) MG/3ML neb solution, Take 1 vial (3 mLs) by nebulization every 6 hours as needed for shortness of breath / dyspnea or wheezing (Patient not taking: Reported on 10/16/2018), Disp: 30 vial, Rfl: 1     Respiratory Therapy Supplies (NEBULIZER/TUBING/MOUTHPIECE) KIT, use with nebulizer (Patient not taking: Reported on 10/16/2018), Disp: 1 kit, Rfl: 11  Immunization History   Administered Date(s) Administered     Influenza (H1N1) 01/11/2010     Influenza (IIV3) PF 10/29/2008, 08/26/2009, 10/05/2010     Influenza Quad, Recombinant, p-free (RIV4) 10/21/2019     Influenza Vaccine IM > 6 months Valent IIV4 10/23/2015, 09/21/2017, 09/06/2018     Pneumococcal 23 valent 01/29/2016     TDAP Vaccine (Adacel) 11/18/2008     No Known Allergies  OBJECTIVE:                                                                 /74 (BP Location: Left arm, Patient Position: Sitting, Cuff Size: Adult Large)   Pulse 85   Temp 98.3  F (36.8  C) (Tympanic)   Ht 1.754 m (5' 9.06\")   Wt 103.7 kg (228 lb 9.9 oz)   SpO2 96%   BMI 33.71 kg/m           Physical Exam  Vitals signs and nursing note reviewed.   Constitutional:       General: He is not in acute distress.     Appearance: He is obese. He is " not diaphoretic.   HENT:      Head: Normocephalic and atraumatic.      Right Ear: Tympanic membrane, ear canal and external ear normal.      Left Ear: Tympanic membrane, ear canal and external ear normal.      Nose: Septal deviation (Septal spur on the left noted) present. No mucosal edema or rhinorrhea.      Right Turbinates: Not enlarged, swollen or pale.      Left Turbinates: Not enlarged, swollen or pale.      Mouth/Throat:      Lips: Pink.      Mouth: Mucous membranes are moist.      Pharynx: Oropharynx is clear. No pharyngeal swelling, oropharyngeal exudate or posterior oropharyngeal erythema.   Eyes:      General:         Right eye: No discharge.         Left eye: No discharge.      Conjunctiva/sclera: Conjunctivae normal.   Pulmonary:      Effort: No respiratory distress.      Breath sounds: Normal breath sounds and air entry. No stridor, decreased air movement or transmitted upper airway sounds. No decreased breath sounds, wheezing, rhonchi or rales.   Musculoskeletal: Normal range of motion.   Lymphadenopathy:      Cervical: No cervical adenopathy.   Skin:     General: Skin is warm.      Capillary Refill: Capillary refill takes less than 2 seconds.   Neurological:      Mental Status: He is alert.   Psychiatric:         Mood and Affect: Mood normal.         Behavior: Behavior normal.           WORKUP:   SPIROMETRY       FVC 4.00L (85% of predicted).     FEV1 3.36L (94% of predicted).     FEV1/FVC 84%     FEF 25%-75%  4.10L/s (135% of predicted)    My interpretation: The office spirometry performed today does not suggest an obstruction.  Stable.      Asthma Control Test (ACT) total score: 21       ASSESSMENT/PLAN:    1. Moderate persistent asthma, uncomplicated  (primary encounter diagnosis)    Currently well controlled with Breo Ellipta 100/25 mcg 1 puff once daily, montelukast 10 mg by mouth once daily and albuterol inhaler as needed.  -Continue as is.  He is not interested in stepdown.        Spirometry,  Breathing Capacity, montelukast         (SINGULAIR) 10 MG tablet,         fluticasone-vilanterol (BREO ELLIPTA) 100-25         MCG/INH inhaler, beclomethasone HFA (QVAR         REDIHALER) 80 MCG/ACT inhaler             2. Chronic rhinitis  Well controlled with flunisolide 2 sprays/nostril once-twice daily, azelastine 2 sprays/nostril once-twice daily, and montelukast 10 mg by mouth daily.   -Continue as is.     azelastine (ASTELIN) 0.1 % nasal spray  flunisolide (NASALIDE) 25 MCG/ACT (0.025%) SOLN        spray             3. IgM deficiency (H)  Manifested by bronchitis and sinus infections, especially in cold seasons.  In the past, recommended to take azithromycin 250 mg by mouth once daily on Mondays, Wednesdays, and Fridays from mid October until mid April.  The patient states that he takes it on Mondays, Wednesdays, and Saturdays all seasons except Summer.  He is well controlled on this regimen.  He rarely has sinus infections and/or bronchitis.    azithromycin (ZITHROMAX) 250 MG tablet            4. Need for prophylactic vaccination and inoculation against influenza    INFLUENZA QUAD, RECOMBINANT, P-FREE (RIV4)         (FLUBLOCK) [52883], Vaccine Administration,         Initial [81847]        Return in about 1 year (around 10/21/2020), or if symptoms worsen or fail to improve.    Thank you for allowing us to participate in the care of this patient. Please feel free to contact us if there are any questions or concerns about the patient.    Disclaimer: This note consists of symbols derived from keyboarding, dictation and/or voice recognition software. As a result, there may be errors in the script that have gone undetected. Please consider this when interpreting information found in this chart.    Jose Angel Leiva MD, FAAAAI, FACAAI  Allergy, Asthma and Immunology  [place], [place] and [place]    Again, thank you for allowing me to participate in the care of your patient.         Sincerely,        Jose Angel Leiva MD

## 2019-10-21 NOTE — PROGRESS NOTES
SUBJECTIVE:                                                               Markell Ramsey presents today to our Allergy Clinic at [facility]  for a follow up visit.    As you know, he is a 59-year-old male with moderate persistent asthma, allergic rhinitis, recurrent sinus infection/bronchitis and decreased IgM.      November 2017:Normal CH 50. Normal total IgE.  Normal total IgG.  Normal IgG subclasses.   Normal total IgA level. Decreased IgM level on multiple occasions. Normal T and B cells (flow cytometry).  20/22 protective pneumococcal antibody levels. Protective tetanus antibody level.       SPT in 2007 was positive to molds, tree pollen, grass mix, and ragweed. IDs were positive for cat, roaches,  dog, mite. Aspergillus mold, and marsh elder. Started by Dr. Whittington on allergen IT in 2007,  [facility] Whittington used to practice before. He received allergen immunotherapy on and off until 2010, which he found partially helpful.       Sinus CT in 2013, with mild-moderate sinus disease, and DNS on the left. The patient confirms nasal congestion L>R.  IMPRESSION:  Mild/moderate mucosal thickening in the paranasal sinuses as described above.  Slight worsening of the right maxillary sinus and worsening of the right frontal sinus since the previous exam, but clearing of the left sphenoid sinus.    Regarding his asthma, he has been  7/7 days compliant with his controller medications (Breo 100/25 mcg 1 puff once daily and montelukast 10 mg by mouth once daily). Markell is using albuterol HFA less than twice per week for rescue from chest symptoms. He uses QVAR 80 mcg 2 puffs three times a day in the Yellow Zone. He used it 4-5 times for the last year. He is waking up less than twice per month due to chest symptoms.  There has been no use of oral steroids since that visit. No ED/PCP/urgent care/other specialist visits for asthma flare since the last visit. The patient denies  current chest tightness, cough, wheeze or shortness of breath.   Asthma triggers remain unchanged. he uses a blue/yellow spacer device, where applicable.  Rhinitis is relatively well controlled with flunisolide 2 sprays/nostril once-twice daily, azelastine 2 sprays/nostril once-twice daily, and montelukast 10 mg by mouth daily. Was sick with a viral infection last month, and had some flare in the nasal symptoms, but he recovered. This exactly the same thing what he told me last time. The patient denies clear rhinorrhea, nasal itch, stuffiness, sneezing or interval sinusitis symptoms of fever, facial pain or purulent rhinorrhea.  He takes azithromycin 250 mg by mouth once daily three times a week, and he is doing well. Frequency of bronchitis and sinus infections has significantly improved. He was skipping it in warm season. Started it again in August.    Patient Active Problem List   Diagnosis     Gastroesophageal reflux disease without esophagitis     Non morbid obesity due to excess calories     CARDIOVASCULAR SCREENING; LDL GOAL LESS THAN 160     IgM deficiency (H)     Moderate persistent asthma, uncomplicated     Essential hypertension     Hyperlipidemia LDL goal <130     DNS (deviated nasal septum)     Other chronic rhinitis       Past Medical History:   Diagnosis Date     Acute bronchitis      Allergic rhinitis, cause unspecified 9/23/2008     Allergic rhinitis, unspecified allergic rhinitis type 9/23/2008     Problem list name updated by automated process. Provider to review     Esophageal reflux      Pneumonia 2/24/2013    MRSA, required hospitalization     Unspecified asthma(493.90)       Problem (# of Occurrences) Relation (Name,Age of Onset)    Allergies (1) Mother: asthma    Cancer (1) Father: lung    Respiratory (3) Sister: asthma, Son (Luke): Upper Resp infections, Son (David): asthma        Past Surgical History:   Procedure Laterality Date     C APPENDECTOMY  08/2006     SURGICAL HISTORY OF -    2/23/06    L4-5 microdiscectomy     TONSILLECTOMY & ADENOIDECTOMY       Social History     Socioeconomic History     Marital status:      Spouse name: None     Number of children: None     Years of education: None     Highest education level: None   Occupational History     None   Social Needs     Financial resource strain: None     Food insecurity:     Worry: None     Inability: None     Transportation needs:     Medical: None     Non-medical: None   Tobacco Use     Smoking status: Never Smoker     Smokeless tobacco: Never Used   Substance and Sexual Activity     Alcohol use: Yes     Comment: rare     Drug use: No     Sexual activity: Yes     Partners: Female   Lifestyle     Physical activity:     Days per week: None     Minutes per session: None     Stress: None   Relationships     Social connections:     Talks on phone: None     Gets together: None     Attends Yazdanism service: None     Active member of club or organization: None     Attends meetings of clubs or organizations: None     Relationship status: None     Intimate partner violence:     Fear of current or ex partner: None     Emotionally abused: None     Physically abused: None     Forced sexual activity: None   Other Topics Concern     Parent/sibling w/ CABG, MI or angioplasty before 65F 55M? Not Asked   Social History Narrative    October 21, 2019    ENVIRONMENTAL HISTORY: [place]. The home is heated with a forced air and gas furnace. They does have central air conditioning. The patient's bedroom is furnished with hard naseem in bedroom, allergen mattress cover and allergen pillowcase cover. No pets inside the house. There is no history of cockroach or mice infestation. There are no smokers in the house.  The house does not have a damp basement.            Review of Systems   Constitutional: Negative for activity change, fatigue and fever.   HENT: Negative for congestion, dental problem, ear  pain, facial swelling, nosebleeds, postnasal drip, rhinorrhea, sinus pressure and sneezing.    Eyes: Negative for discharge, redness and itching.   Respiratory: Negative for cough, chest tightness, shortness of breath and wheezing.    Cardiovascular: Negative for chest pain.   Gastrointestinal: Negative for diarrhea, nausea and vomiting.   Musculoskeletal: Negative for arthralgias, joint swelling and myalgias.   Skin: Negative for rash.   Neurological: Negative for headaches.   Hematological: Negative for adenopathy.   Psychiatric/Behavioral: Negative for behavioral problems and self-injury.           Current Outpatient Medications:      atorvastatin (LIPITOR) 10 MG tablet, Take 1 tablet (10 mg) by mouth daily, Disp: 90 tablet, Rfl: 3     azelastine (ASTELIN) 0.1 % nasal spray, Spray 1 spray into both nostrils 2 times daily, Disp: 90 mL, Rfl: 0     azithromycin (ZITHROMAX) 250 MG tablet, Take one tablet by mouth once daily on Mondays, Wednesdays and Fridays., Disp: 36 tablet, Rfl: 3     beclomethasone HFA (QVAR REDIHALER) 80 MCG/ACT inhaler, Inhale 2 puffs into the lungs 3 times daily In the yellow and red zones only of asthma action plan, Disp: 1 Inhaler, Rfl: 0     ESOMEPRAZOLE MAGNESIUM PO, Take 20 mg by mouth every morning (before breakfast), Disp: , Rfl:      flunisolide (NASALIDE) 25 MCG/ACT (0.025%) SOLN spray, Spray 2 sprays into both nostrils 2 times daily, Disp: 3 Bottle, Rfl: 3     fluticasone-vilanterol (BREO ELLIPTA) 100-25 MCG/INH inhaler, Inhale 1 puff into the lungs daily, Disp: 3 Inhaler, Rfl: 3     lisinopril-hydrochlorothiazide (PRINZIDE/ZESTORETIC) 10-12.5 MG per tablet, Take 1 tablet by mouth daily, Disp: 90 tablet, Rfl: 3     montelukast (SINGULAIR) 10 MG tablet, Take 1 tablet (10 mg) by mouth At Bedtime, Disp: 90 tablet, Rfl: 3     albuterol (2.5 MG/3ML) 0.083% nebulizer solution, Take 1 vial (2.5 mg) by nebulization every 4 hours as needed for shortness of breath / dyspnea (Patient not  "taking: Reported on 10/16/2018), Disp: 30 vial, Rfl: 11     albuterol (VENTOLIN HFA) 108 (90 BASE) MCG/ACT Inhaler, Inhale 2 puffs into the lungs every 4 hours as needed Take for shortness of breath, cough or wheeze. Take before exercise. (Patient not taking: Reported on 10/16/2018), Disp: 1 Inhaler, Rfl: 11     azithromycin (ZITHROMAX) 250 MG tablet, Two tablets once a day every Saturday. (Patient not taking: Reported on 10/21/2019), Disp: 12 tablet, Rfl: 1     ipratropium - albuterol 0.5 mg/2.5 mg/3 mL (DUONEB) 0.5-2.5 (3) MG/3ML neb solution, Take 1 vial (3 mLs) by nebulization every 6 hours as needed for shortness of breath / dyspnea or wheezing (Patient not taking: Reported on 10/16/2018), Disp: 30 vial, Rfl: 1     Respiratory Therapy Supplies (NEBULIZER/TUBING/MOUTHPIECE) KIT, use with nebulizer (Patient not taking: Reported on 10/16/2018), Disp: 1 kit, Rfl: 11  Immunization History   Administered Date(s) Administered     Influenza (H1N1) 01/11/2010     Influenza (IIV3) PF 10/29/2008, 08/26/2009, 10/05/2010     Influenza Quad, Recombinant, p-free (RIV4) 10/21/2019     Influenza Vaccine IM > 6 months Valent IIV4 10/23/2015, 09/21/2017, 09/06/2018     Pneumococcal 23 valent 01/29/2016     TDAP Vaccine (Adacel) 11/18/2008     No Known Allergies  OBJECTIVE:                                                                 /74 (BP Location: Left arm, Patient Position: Sitting, Cuff Size: Adult Large)   Pulse 85   Temp 98.3  F (36.8  C) (Tympanic)   Ht 1.754 m (5' 9.06\")   Wt 103.7 kg (228 lb 9.9 oz)   SpO2 96%   BMI 33.71 kg/m          Physical Exam  Vitals signs and nursing note reviewed.   Constitutional:       General: He is not in acute distress.     Appearance: He is obese. He is not diaphoretic.   HENT:      Head: Normocephalic and atraumatic.      Right Ear: Tympanic membrane, ear canal and external ear normal.      Left Ear: Tympanic membrane, ear canal and external ear normal.      Nose: Septal " deviation (Septal spur on the left noted) present. No mucosal edema or rhinorrhea.      Right Turbinates: Not enlarged, swollen or pale.      Left Turbinates: Not enlarged, swollen or pale.      Mouth/Throat:      Lips: Pink.      Mouth: Mucous membranes are moist.      Pharynx: Oropharynx is clear. No pharyngeal swelling, oropharyngeal exudate or posterior oropharyngeal erythema.   Eyes:      General:         Right eye: No discharge.         Left eye: No discharge.      Conjunctiva/sclera: Conjunctivae normal.   Pulmonary:      Effort: No respiratory distress.      Breath sounds: Normal breath sounds and air entry. No stridor, decreased air movement or transmitted upper airway sounds. No decreased breath sounds, wheezing, rhonchi or rales.   Musculoskeletal: Normal range of motion.   Lymphadenopathy:      Cervical: No cervical adenopathy.   Skin:     General: Skin is warm.      Capillary Refill: Capillary refill takes less than 2 seconds.   Neurological:      Mental Status: He is alert.   Psychiatric:         Mood and Affect: Mood normal.         Behavior: Behavior normal.           WORKUP:   SPIROMETRY       FVC 4.00L (85% of predicted).     FEV1 3.36L (94% of predicted).     FEV1/FVC 84%     FEF 25%-75%  4.10L/s (135% of predicted)    My interpretation: The office spirometry performed today does not suggest an obstruction.  Stable.      Asthma Control Test (ACT) total score: 21       ASSESSMENT/PLAN:    1. Moderate persistent asthma, uncomplicated  (primary encounter diagnosis)    Currently well controlled with Breo Ellipta 100/25 mcg 1 puff once daily, montelukast 10 mg by mouth once daily and albuterol inhaler as needed.  -Continue as is.  He is not interested in stepdown.        Spirometry, Breathing Capacity, montelukast         (SINGULAIR) 10 MG tablet,         fluticasone-vilanterol (BREO ELLIPTA) 100-25         MCG/INH inhaler, beclomethasone HFA (QVAR         REDIHALER) 80 MCG/ACT inhaler             2.  Chronic rhinitis  Well controlled with flunisolide 2 sprays/nostril once-twice daily, azelastine 2 sprays/nostril once-twice daily, and montelukast 10 mg by mouth daily.   -Continue as is.     azelastine (ASTELIN) 0.1 % nasal spray  flunisolide (NASALIDE) 25 MCG/ACT (0.025%) SOLN        spray             3. IgM deficiency (H)  Manifested by bronchitis and sinus infections, especially in cold seasons.  In the past, recommended to take azithromycin 250 mg by mouth once daily on Mondays, Wednesdays, and Fridays from mid October until mid April.  The patient states that he takes it on Mondays, Wednesdays, and Saturdays all seasons except Summer.  He is well controlled on this regimen.  He rarely has sinus infections and/or bronchitis.    azithromycin (ZITHROMAX) 250 MG tablet            4. Need for prophylactic vaccination and inoculation against influenza    INFLUENZA QUAD, RECOMBINANT, P-FREE (RIV4)         (FLUBLOCK) [46160], Vaccine Administration,         Initial [08000]        Return in about 1 year (around 10/21/2020), or if symptoms worsen or fail to improve.    Thank you for allowing us to participate in the care of this patient. Please feel free to contact us if there are any questions or concerns about the patient.    Disclaimer: This note consists of symbols derived from keyboarding, dictation and/or voice recognition software. As a result, there may be errors in the script that have gone undetected. Please consider this when interpreting information found in this chart.    Jose Angel Leiva MD, FAAAAI, FACAAI  Allergy, Asthma and Immunology  [place], [place] and [place]

## 2019-10-22 ENCOUNTER — TELEPHONE (OUTPATIENT)
Dept: ALLERGY | Facility: CLINIC | Age: 59
End: 2019-10-22

## 2019-10-22 DIAGNOSIS — J45.40 MODERATE PERSISTENT ASTHMA, UNCOMPLICATED: Chronic | ICD-10-CM

## 2019-10-22 ASSESSMENT — ASTHMA QUESTIONNAIRES: ACT_TOTALSCORE: 21

## 2019-10-22 NOTE — TELEPHONE ENCOUNTER
[place]ACY[place]  [facility] [place]    Qvar redihaler 80 mcg - 90-day supply requested per nicole.    Denise Behrendt  Specialty CSS

## 2019-10-22 NOTE — TELEPHONE ENCOUNTER
Called and spoke with [facility]. They state that pt's insurance will only cover a 90 day supply.     Called and spoke with pt. States he is fine with filling a 90 day supply  As he has met his deduct able for the year.     Refilled as a 90 day supply sent to [facility] with notation to only keep on file until pt requests refill.     Delphine LU   Allergy RN

## 2019-10-29 NOTE — PROGRESS NOTES
Subjective     Markell Ramsey is a 59 year old male who presents to clinic today for the following health issues:    HPI     Hyperlipidemia Follow-Up  Atorvastatin 10mg qd    Are you having any of the following symptoms? (Select all that apply)  No complaints of shortness of breath, chest pain or pressure.  No increased sweating or nausea with activity.  No left-sided neck or arm pain.  No complaints of pain in calves when walking 1-2 blocks.    Are you regularly taking any medication or supplement to lower your cholesterol?   Yes- statin    Are you having muscle aches or other side effects that you think could be caused by your cholesterol lowering medication?  No    Hypertension Follow-up  Lisinopril-hydrochlorothiazide 10/12.5mg qd    Do you check your blood pressure regularly outside of the clinic? No     Are you following a low salt diet? No    Are your blood pressures ever more than 140 on the top number (systolic) OR more   than 90 on the bottom number (diastolic), for example 140/90? N/A    BP Readings from Last 6 Encounters:   10/30/19 118/80   10/21/19 115/74   10/19/18 116/66   10/16/18 126/80   09/06/18 117/73   07/23/18 124/70       - Patient has not completed colonoscopy or FIT testing in the past. He denies any family history of colorectal cancer in first degree relative.       How many servings of fruits and vegetables do you eat daily?  2-3    On average, how many sweetened beverages do you drink each day (soda, juice, sweet tea, etc)?   0    How many days per week do you miss taking your medication? 0          Current Outpatient Medications   Medication Sig Dispense Refill     atorvastatin (LIPITOR) 10 MG tablet Take 1 tablet (10 mg) by mouth daily 90 tablet 3     azithromycin (ZITHROMAX) 250 MG tablet Take one tablet by mouth once daily on Mondays, Wednesdays and Fridays. 36 tablet 3     beclomethasone HFA (QVAR REDIHALER) 80 MCG/ACT inhaler Inhale 2 puffs into the lungs 3 times daily In the yellow and  "red zones only of asthma action plan 3 Inhaler 0     ESOMEPRAZOLE MAGNESIUM PO Take 20 mg by mouth every morning (before breakfast)       flunisolide (NASALIDE) 25 MCG/ACT (0.025%) SOLN spray Spray 2 sprays into both nostrils 2 times daily 3 Bottle 3     fluticasone-vilanterol (BREO ELLIPTA) 100-25 MCG/INH inhaler Inhale 1 puff into the lungs daily 3 Inhaler 3     lisinopril-hydrochlorothiazide (PRINZIDE/ZESTORETIC) 10-12.5 MG per tablet Take 1 tablet by mouth daily 90 tablet 3     montelukast (SINGULAIR) 10 MG tablet Take 1 tablet (10 mg) by mouth At Bedtime 90 tablet 3     albuterol (VENTOLIN HFA) 108 (90 BASE) MCG/ACT Inhaler Inhale 2 puffs into the lungs every 4 hours as needed Take for shortness of breath, cough or wheeze. Take before exercise. (Patient not taking: Reported on 10/16/2018) 1 Inhaler 11     azelastine (ASTELIN) 0.1 % nasal spray Spray 1 spray into both nostrils 2 times daily 90 mL 0     ipratropium - albuterol 0.5 mg/2.5 mg/3 mL (DUONEB) 0.5-2.5 (3) MG/3ML neb solution Take 1 vial (3 mLs) by nebulization every 6 hours as needed for shortness of breath / dyspnea or wheezing (Patient not taking: Reported on 10/16/2018) 30 vial 1     Respiratory Therapy Supplies (NEBULIZER/TUBING/MOUTHPIECE) KIT use with nebulizer (Patient not taking: Reported on 10/16/2018) 1 kit 11         Reviewed and updated as needed this visit by Provider  Tobacco  Problems  Med Hx  Surg Hx  Fam Hx  Soc Hx        Review of Systems   ROS COMP: Constitutional, HEENT, cardiovascular, pulmonary, gi and gu systems are negative, except as otherwise noted.      Objective    /80   Pulse 64   Temp 97.4  F (36.3  C) (Tympanic)   Resp 14   Ht 1.754 m (5' 9.06\")   Wt 103.1 kg (227 lb 6 oz)   BMI 33.52 kg/m    Body mass index is 33.52 kg/m .  Physical Exam   GENERAL APPEARANCE: healthy, alert, no distress and over weight  RESP: lungs clear to auscultation - no rales, rhonchi or wheezes  CV: regular rates and rhythm, normal " S1 S2, no S3 or S4 and no murmur, click or rub          Assessment & Plan     1. Hyperlipidemia LDL goal <130- well controlled.  Due for labs.  Med filled  - atorvastatin (LIPITOR) 10 MG tablet; Take 1 tablet (10 mg) by mouth daily  Dispense: 90 tablet; Refill: 3  - ALT; Future  - Lipid panel reflex to direct LDL Fasting; Future    2. Essential hypertension- well controlled.  Due for labs.  Med filled  - lisinopril-hydrochlorothiazide (PRINZIDE/ZESTORETIC) 10-12.5 MG tablet; Take 1 tablet by mouth daily  Dispense: 90 tablet; Refill: 3  - Basic metabolic panel; Future    3. Gastroesophageal reflux disease without esophagitis -occasional use of PPI    4. Encounter for hepatitis C screening test for low risk patient  - Hepatitis C antibody; Future    5. Screening for HIV (human immunodeficiency virus) -screening in 2013    6. Special screening for malignant neoplasms, colon  - Fecal colorectal cancer screen (FIT); Future    7. Moderate persistent asthma, uncomplicated  - albuterol (VENTOLIN HFA) 108 (90 Base) MCG/ACT inhaler; Inhale 2 puffs into the lungs every 4 hours as needed for shortness of breath / dyspnea Take before exercise.  Dispense: 1 Inhaler; Refill: 11         Return in about 1 year (around 10/30/2020) for Annual Wellness Check-Up, Routine Follow-Up/Med Check.    Mary Lezama, [facility]

## 2019-10-30 ENCOUNTER — OFFICE VISIT (OUTPATIENT)
Dept: FAMILY MEDICINE | Facility: CLINIC | Age: 59
End: 2019-10-30
Payer: COMMERCIAL

## 2019-10-30 VITALS
DIASTOLIC BLOOD PRESSURE: 80 MMHG | HEIGHT: 69 IN | HEART RATE: 64 BPM | RESPIRATION RATE: 14 BRPM | BODY MASS INDEX: 33.68 KG/M2 | TEMPERATURE: 97.4 F | SYSTOLIC BLOOD PRESSURE: 118 MMHG | WEIGHT: 227.38 LBS

## 2019-10-30 DIAGNOSIS — Z12.11 SPECIAL SCREENING FOR MALIGNANT NEOPLASMS, COLON: ICD-10-CM

## 2019-10-30 DIAGNOSIS — Z11.4 SCREENING FOR HIV (HUMAN IMMUNODEFICIENCY VIRUS): ICD-10-CM

## 2019-10-30 DIAGNOSIS — K21.9 GASTROESOPHAGEAL REFLUX DISEASE WITHOUT ESOPHAGITIS: ICD-10-CM

## 2019-10-30 DIAGNOSIS — J45.40 MODERATE PERSISTENT ASTHMA, UNCOMPLICATED: Chronic | ICD-10-CM

## 2019-10-30 DIAGNOSIS — E78.5 HYPERLIPIDEMIA LDL GOAL <130: Primary | ICD-10-CM

## 2019-10-30 DIAGNOSIS — I10 ESSENTIAL HYPERTENSION: ICD-10-CM

## 2019-10-30 DIAGNOSIS — Z11.59 ENCOUNTER FOR HEPATITIS C SCREENING TEST FOR LOW RISK PATIENT: ICD-10-CM

## 2019-10-30 PROCEDURE — 99214 OFFICE O/P EST MOD 30 MIN: CPT | Performed by: INTERNAL MEDICINE

## 2019-10-30 RX ORDER — ATORVASTATIN CALCIUM 10 MG/1
10 TABLET, FILM COATED ORAL DAILY
Qty: 90 TABLET | Refills: 3 | Status: SHIPPED | OUTPATIENT
Start: 2019-10-30 | End: 2020-09-30

## 2019-10-30 RX ORDER — LISINOPRIL/HYDROCHLOROTHIAZIDE 10-12.5 MG
1 TABLET ORAL DAILY
Qty: 90 TABLET | Refills: 3 | Status: SHIPPED | OUTPATIENT
Start: 2019-10-30 | End: 2020-09-30

## 2019-10-30 RX ORDER — ALBUTEROL SULFATE 90 UG/1
2 AEROSOL, METERED RESPIRATORY (INHALATION) EVERY 4 HOURS PRN
Qty: 1 INHALER | Refills: 11 | Status: SHIPPED | OUTPATIENT
Start: 2019-10-30 | End: 2020-09-18

## 2019-10-30 ASSESSMENT — PAIN SCALES - GENERAL: PAINLEVEL: NO PAIN (0)

## 2019-10-30 ASSESSMENT — MIFFLIN-ST. JEOR: SCORE: 1847.7

## 2019-10-30 NOTE — PATIENT INSTRUCTIONS
1. Labs today  2. Refills given  3. You are due for colon cancer screening - return stool test. Do this yearly      Dr. Lezama's Tips for a Healthy Diet    1. Add more fresh fruits and vegetables to your diet.  The more colorful with the fruit or vegetable (think berries, spinach, carrots, peppers) the healthier it tends to be.  Juice is not a fruit.  Prepare the vegetables in a healthy way - steam, bake.  Avoid batter/breading, butter/oil to cook.  2. Add more fiber to your diet.  Swap out white bread, white rice, white pasta for whole grain versions.  Reduce the portion size and frequency of carbohydrates/starches.  3. Choose healthier fats such as nuts, olive oil, avocado, etc. Stay away from lard, butter.  4. Decrease the frequency and portion size of 'junk food' -pizza, candy, cookies, potato chips, etc.  5. Watch liquid calories such as coffee drinks, juice, soda, teas.  There tends to be excessive sugar in these beverages.  6. Increase protein in your diet.  Eggs, cheese, yogurt, nuts, lentils, chicken, fish are good healthy choices.  Protein keeps you de jesus longer, and you are less likely to have blood sugar spikes  7. Eat healthy 80% of the time.  It is ok for a special treat (Mom's spaghetti dinner, cake dessert on your birthday) every once in a while, just not every day.  When are you going to indulge (think [employer] time), be sure you are eating extra healthy the day before and after.

## 2019-10-31 DIAGNOSIS — I10 ESSENTIAL HYPERTENSION: ICD-10-CM

## 2019-10-31 DIAGNOSIS — E78.5 HYPERLIPIDEMIA LDL GOAL <130: ICD-10-CM

## 2019-10-31 DIAGNOSIS — R73.01 ELEVATED FASTING BLOOD SUGAR: Primary | ICD-10-CM

## 2019-10-31 DIAGNOSIS — Z11.59 ENCOUNTER FOR HEPATITIS C SCREENING TEST FOR LOW RISK PATIENT: ICD-10-CM

## 2019-10-31 LAB
ALT SERPL W P-5'-P-CCNC: 36 U/L (ref 0–70)
ANION GAP SERPL CALCULATED.3IONS-SCNC: 3 MMOL/L (ref 3–14)
BUN SERPL-MCNC: 13 MG/DL (ref 7–30)
CALCIUM SERPL-MCNC: 8.5 MG/DL (ref 8.5–10.1)
CHLORIDE SERPL-SCNC: 103 MMOL/L (ref 94–109)
CHOLEST SERPL-MCNC: 192 MG/DL
CO2 SERPL-SCNC: 31 MMOL/L (ref 20–32)
CREAT SERPL-MCNC: 1.09 MG/DL (ref 0.66–1.25)
GFR SERPL CREATININE-BSD FRML MDRD: 75 ML/MIN/{1.73_M2}
GLUCOSE SERPL-MCNC: 122 MG/DL (ref 70–99)
HCV AB SERPL QL IA: NONREACTIVE
HDLC SERPL-MCNC: 42 MG/DL
LDLC SERPL CALC-MCNC: 99 MG/DL
NONHDLC SERPL-MCNC: 150 MG/DL
POTASSIUM SERPL-SCNC: 3.5 MMOL/L (ref 3.4–5.3)
SODIUM SERPL-SCNC: 137 MMOL/L (ref 133–144)
TRIGL SERPL-MCNC: 253 MG/DL

## 2019-10-31 PROCEDURE — 80048 BASIC METABOLIC PNL TOTAL CA: CPT | Performed by: INTERNAL MEDICINE

## 2019-10-31 PROCEDURE — 86803 HEPATITIS C AB TEST: CPT | Performed by: INTERNAL MEDICINE

## 2019-10-31 PROCEDURE — 84460 ALANINE AMINO (ALT) (SGPT): CPT | Performed by: INTERNAL MEDICINE

## 2019-10-31 PROCEDURE — 80061 LIPID PANEL: CPT | Performed by: INTERNAL MEDICINE

## 2019-10-31 PROCEDURE — 36415 COLL VENOUS BLD VENIPUNCTURE: CPT | Performed by: INTERNAL MEDICINE

## 2019-11-01 DIAGNOSIS — R73.01 ELEVATED FASTING BLOOD SUGAR: Primary | ICD-10-CM

## 2019-11-01 NOTE — RESULT ENCOUNTER NOTE
Hepatitis C is negative.  Cholesterol is slightly higher from last check 1 year ago.  Liver enzyme is normal.  Kidney function and electrolytes are normal.  Fasting blood sugar is elevated.  We will add on a more specific diabetes test to the labs that are drawn

## 2019-11-02 DIAGNOSIS — R73.01 ELEVATED FASTING BLOOD SUGAR: Primary | ICD-10-CM

## 2019-11-02 LAB — HBA1C MFR BLD: 6 % (ref 0–5.6)

## 2019-11-02 PROCEDURE — 36415 COLL VENOUS BLD VENIPUNCTURE: CPT | Performed by: INTERNAL MEDICINE

## 2019-11-02 PROCEDURE — 83036 HEMOGLOBIN GLYCOSYLATED A1C: CPT | Performed by: INTERNAL MEDICINE

## 2019-11-03 PROBLEM — R73.03 PRE-DIABETES: Status: ACTIVE | Noted: 2019-11-03

## 2019-11-04 NOTE — RESULT ENCOUNTER NOTE
Markell--    a1c (average of blood sugar over 3 months) is elevated in the pre-diabetes range.  Recommend clinic or telephone visit to discuss diet, exercise to prevent the progression to diabetes.  Otherwise, if you prefer, we can mail out information for the Pre-Diabetes class offered at [facility].

## 2019-11-07 ENCOUNTER — HEALTH MAINTENANCE LETTER (OUTPATIENT)
Age: 59
End: 2019-11-07

## 2020-01-22 DIAGNOSIS — J31.0 CHRONIC RHINITIS: ICD-10-CM

## 2020-01-22 NOTE — TELEPHONE ENCOUNTER
Requested Prescriptions   Pending Prescriptions Disp Refills     azelastine (ASTELIN) 0.1 % nasal spray 90 mL 0     Sig: Spray 1 spray into both nostrils 2 times daily       There is no refill protocol information for this order        Last Written Prescription Date:    Last Fill Quantity: ,  # refills:    Last office visit: 10/21/2019 with prescribing provider:  estefany   Future Office Visit:

## 2020-01-23 RX ORDER — AZELASTINE 1 MG/ML
1 SPRAY, METERED NASAL 2 TIMES DAILY
Qty: 90 ML | Refills: 2 | Status: SHIPPED | OUTPATIENT
Start: 2020-01-23 | End: 2020-09-18

## 2020-02-11 ENCOUNTER — TELEPHONE (OUTPATIENT)
Dept: INTERNAL MEDICINE | Facility: CLINIC | Age: 60
End: 2020-02-11

## 2020-02-11 DIAGNOSIS — R68.89 FLU-LIKE SYMPTOMS: Primary | ICD-10-CM

## 2020-02-11 RX ORDER — OSELTAMIVIR PHOSPHATE 75 MG/1
75 CAPSULE ORAL 2 TIMES DAILY
Qty: 10 CAPSULE | Refills: 0 | Status: SHIPPED | OUTPATIENT
Start: 2020-02-11 | End: 2020-02-16

## 2020-02-11 NOTE — TELEPHONE ENCOUNTER
Panel Management Review      Composite cancer screening  Chart review shows that this patient is due/due soon for the following Fecal Colorectal (FIT)  Summary:    Patient is due/failing the following:   FIT    Action needed:   Patient needs sent the Fit test back to us    Type of outreach:    Sent micecloud message.    Questions for provider review:    None                                                                                                                                    Lore Hale CMA (MELISSA)   (aka: Sabina Hale)       Chart routed to Care Team .

## 2020-02-11 NOTE — TELEPHONE ENCOUNTER
Coughing, chills but took tylenol.    Influenza-Like Illness (AMRIK) Protocol    Markell Ramsey      Age: 59 year old     YOB: 1960    Please select the type of triage protocol you used for this patient? A protocol book was used.      Adult Clinical Evaluation     Is this patient experiencing ANY of the following?  Severe lethargy or floppiness No   Struggling to breathe even while inactive or resting No   Unable to stay alert and awake No   Unconsciousness No   Blue or dusky lips, skin, or nail beds No   Seizures No   Completely unable to swallow No     Is this patient experiencing the following?  Patient age is less than 6 weeks No   Inconsolable crying No   Passing little or no urine for 12 hours No   New wheezing or wheezing unresponsive to usual wheezing medications No   Fever > 104 degrees or shaking chills No   Unable or refusing to move neck No   Extremely dry mouth No   Seizure which just occurred but now stopped No   Dizzy when standing or sitting No   Flu-like symptoms previously but have returned and are worse No     Is this patient experiencing the following?  A cough Yes   A sore throat Yes   Muscle/ body aches No   Headaches No   Fatigue (tiredness) No   Fever >100, feels very warm, or has shaking chills Yes     Nursing Plan    Provided home care instructions    General home care instruction:    Avoid contact with people in your household who are at increased risk for more severe complications of influenza (such as pregnant women or people who have a chronic health condition, for example diabetes, heart disease, asthma, or emphysema)    Stay home from school, childcare or other public places until your fever (37.8 degrees Celsius [100 degrees Fahrenheit]) has been gone for at least 24 hours, except to seek medical care. (Fever should be gone without the use of fever-reducing medications.) Use a surgical mask if available, or cover your mouth and nose with a tissue if possible if you need to  seek medical care. Contact your school or  as they may have longer exclusion times.    You may continue to shed virus after your fever is gone. Limit your contact with high-risk individuals for 10 days after your symptoms started and be especially careful to cover your coughs/sneezes and wash your hands.    Cover your cough and wash your hands often, and especially after coughing, sneezing, blowing your nose.    Drink plenty of fluids (such as water, broth, sports drinks, electrolyte beverages for children) to prevent dehydration.    Avoid tobacco and second hand smoke.    Get plenty of rest.    Use over-the-counter pain relievers as needed per  instructions.    Do not give aspirin (acetylsalicylic acid) or products that contain aspirin (e.g. bismuth subsalicylate - Pepto Bismol) to children or teenagers 18 years or younger.    Children younger than 4 years of age should not be given over-the-counter cold medications.    A small number of people with influenza do not have fever. If you have respiratory symptoms and are at increased risk for complications of influenza, contact your health care provider to discuss these symptoms.    For parents of infants:    If possible, only family members who are not sick should care for infants.    Wash your hands with soap and water, or an alcohol-based hand rub (if your hands are not visibly soiled) before caring for your infant.    Cover your mouth and nose with a tissue when coughing or sneezing, and clean your hands.    Contact a health care provider to discuss your illness within 1-2 days if the patient is:    A child less than 5 years    Immunocompromised    If further questions/concerns or if new symptoms develop, call your PCP or [facility] as soon as possible.    When to seek medical attention    Call 911 if the patient experiences:    Difficulty breathing or shortness of breath    Severe lethargy or floppiness    Unable to stay alert and  awake    Unconsciousness     Blue or dusky lips, skin, or nail beds    Seizures    Completely unable to swallow    Contact your health care provider right away if the patient experiences:    A painful sore throat accompanied by fever persists for more than 48 hours    Ear pain, sinus pain, persistent vomiting and/or diarrhea    Oral temperature greater than 104  Fahrenheit (40  Celsius)    Dehydration (e.g., mouth feeling dry, dizzy when sitting/standing, decreased urine output)    Severe or persistent vomiting; unable to keep fluids down    Improvement in flu-like symptoms (fever and cough or sore throat) but then return of fever and worse cough or sore throat    Not drinking enough fluid    Not waking up or interacting    Irritability in a child such that it does not want to be held    Any other concerns not stated above        Influenza-Like Illness (AMRIK) Standing Order    Has the patient been seen by a primary care provider at an [facility] or [facility] within the past two years? Yes     Do any of the following exclusions apply to the patient?  Does the patient have a history of CrCl less than or equal to 60 ml/min No   Is the patient taking Probenecid No   Was an Intranasal live attenuated influenza vaccine (LAIV) received by the patient 2 weeks before antiviral medication No   Was an LAIV received 48 hours after administration of influenza antiviral drugs, if planning on obtaining? No     Does this patient have ANY of the above exclusions answered Yes?  No.      Is this patient currently showing symptoms of Influenza like Illness (AMRIK) after close proximity to someone with a verified diagnosis of Influenza, or is this patient not sick but has had known exposure within less than or equal to 48 hours through close proximity with someone that has a verified diagnosis of Influenza?   This patient is currently sick with AMRIK type symptoms     Does this patient  have ANY of the following specialty conditions?  Chemotherapy or radiation within the last 3 months No   Organ or bone marrow transplant No   Metabolic disorder No   HIV patient with CD4 count <200 No     Does this patient have ANY of the above specialty conditions? No     Have the symptoms of AMRIK been present for less than or equal to 48 hours? Yes     Adult Evaluation for Possible Influenza Treatment due to AMRIK Symptoms      Below are conditions which place adults at increased risk for the more severe complications of influenza.    Does this patient have ANY of the following conditions?  Is 65 years of age or older No   Chronic pulmonary disease such as asthma or COPD Yes   Heart disease (CHF, CAD, anticoagulation d/t arrhytmia, congenital heart anomaly) *HTN alone is excluded No   Kidney disease (renal failure, insufficiency or dialysis) No   Hepatic or Hematologic disorder (e.g. chronic liver disease patient, sickle cell disease) No   Diabetes (Type 1 or Type 2) No   Neurologic and Neurodevelopment Conditions (including disorders of the brain, spinal cord, peripheral nerve, and muscle, such as cerebral palsy, epilepsy (seizure disorders), stroke, intellectual disability, moderate to severe developmental delay, muscular dystrophy, or spinal cord injury) No   Obese with BMI >40 No   Immunosuppression caused by medications such as those taking prednisone in excess of 20mg daily, or caused by HIV/AIDS with CD4 count more than 200 No   Is pregnant, may be pregnant, or is within two weeks after delivery No   Is a resident of a chronic care facility No   Is patient  or [place] native No   Is <19 years old and is receiving long term aspirin- or salicylate-containing medications No     Does this patient have ANY of the above conditions?  Yes       Current parent reported weight:220    Wt Readings from Last 1 Encounters:   10/30/19 103.1 kg (227 lb 6 oz)       Does the patient require a re-weigh?No, the  patient is not pediatric and doesn't require parent weight.     The patient qualifies as a patient that may benefit from an order of Tamiflu for treatment of AMRIK per the standing order.    Use SmartSet Standing Order for Influenza Symptom Treatment BID to find suggested Tamiflu order.    Note: if the patient is taking Warfarin (Coumadin), it is okay to order Tamifu if patient has a follow up with INR nurse within 3-5 days of ordering Tamiflu. Assist with scheduling to secure appointment whenever possible.    Have the patient notify their provider of:  - Worsening Symptoms  - Transient Neuropsychiatric events (self-injury and/or delirium)  - Skin Reactions      Additional educational resources include:    http://www.mdImmunexpress.com    http://www.cdc.gov/flu/  Crista Vega RN

## 2020-02-11 NOTE — TELEPHONE ENCOUNTER
Reason for Call:  Other Flu    Detailed comments: Family has flu, patient starting to get symptoms, he has asthma.    Phone Number Patient can be reached at: Home number on file [phone] (home)    Best Time: Any    Can we leave a detailed message on this number? YES    Call taken on 2/11/2020 at 9:40 AM by Libertad Funes

## 2020-02-18 NOTE — TELEPHONE ENCOUNTER
(2nd attempt) Left a voice msg for pt to call back,  Whenever he calls back remind him to sent back he Fit test.  Lore Hale CMA (MELISSA)   (aka: Sabina Hale)

## 2020-02-26 NOTE — TELEPHONE ENCOUNTER
(3rd attempt) Left a voice msg for pt to call back,  Whenever he calls back remind him to sent back he Fit test.  Lore Hale CMA (MELISSA)   (aka: Sabina Hale)

## 2020-03-02 DIAGNOSIS — J45.40 MODERATE PERSISTENT ASTHMA, UNCOMPLICATED: Chronic | ICD-10-CM

## 2020-03-02 NOTE — TELEPHONE ENCOUNTER
Inhaled Steroids Protocol Passed3/2 8:09 AM   Patient is age 12 or older    Asthma control assessment score within normal limits in last 6 months    Medication is active on med list    Recent (6 mo) or future (30 days) visit within the authorizing provider's specialty     Refilled per [facility] protocol and scope of practice.  Kristin Petty RN

## 2020-03-02 NOTE — TELEPHONE ENCOUNTER
Requested Prescriptions   Pending Prescriptions Disp Refills     fluticasone-vilanterol (BREO ELLIPTA) 100-25 MCG/INH inhaler 3 Inhaler 3     Sig: Inhale 1 puff into the lungs daily       There is no refill protocol information for this order        Last office visit: 10/21/2019 with prescribing provider:  Dr. Leiva   Future Office Visit:      Denise Behrendt  Specialty CSS

## 2020-03-10 DIAGNOSIS — J45.40 MODERATE PERSISTENT ASTHMA, UNCOMPLICATED: Chronic | ICD-10-CM

## 2020-03-10 NOTE — TELEPHONE ENCOUNTER
Inhaled Steroids Protocol [id number]/10/2020 04:16 PM   Patient is age 12 or older Protocol Details    Asthma control assessment score within normal limits in last 6 months     Medication is active on med list     Recent (6 mo) or future (30 days) visit within the authorizing provider's specialty        To be filled at: [facility] [id number] [place], [place] - [facility]      Refilled per [facility] protocol and scope of practice.  Kristin Petty RN

## 2020-03-10 NOTE — TELEPHONE ENCOUNTER
Requested Prescriptions   Pending Prescriptions Disp Refills     beclomethasone HFA (QVAR REDIHALER) 80 MCG/ACT inhaler 3 Inhaler 0     Sig: Inhale 2 puffs into the lungs 3 times daily In the yellow and red zones only of asthma action plan       There is no refill protocol information for this order        Last office visit: 10/21/2019 with prescribing provider:  Dr. Leiva   Future Office Visit:        A month ago pt had the flu - took Tamiflu. Still having sinus congestion, mild headaches, sinuses feel inflamed, pain around eye sockets. Says when he feels like it is getting better, then it will come back in a couple days - Does he need to be seen?    Please contact pt @:  [phone] (ok to leave message)

## 2020-04-07 ENCOUNTER — TELEPHONE (OUTPATIENT)
Dept: ALLERGY | Facility: CLINIC | Age: 60
End: 2020-04-07

## 2020-04-07 NOTE — TELEPHONE ENCOUNTER
Prior Authorization Retail Medication Request    Medication/Dose: azithromycin (ZITHROMAX) 250 MG tablet  ICD code (if different than what is on RX):    Previously Tried and Failed:    Rationale:      Insurance Name:  [employer]  Insurance ID:  [id number]       Pharmacy Information (if different than what is on RX)  Name:  [facility]  Phone:  [phone]

## 2020-04-07 NOTE — TELEPHONE ENCOUNTER
Central Prior Authorization Team   Phone: [phone]      Prior Authorization Approval    Authorization Effective Date: 4/7/2020  Authorization Expiration Date: 7/6/2020  Medication: azithromycin (ZITHROMAX) 250 MG tablet - APPROVED  Approved Dose/Quantity: 36 for 84  Reference #:     Insurance Company: [employer] - Phone [phone] Fax [phone]  Expected CoPay:       CoPay Card Available:      Foundation Assistance Needed:    Which Pharmacy is filling the prescription (Not needed for infusion/clinic administered): [facility] [id number] [place] [address]  Pharmacy Notified: Yes  Patient Notified: Yes (**Instructed pharmacy to notify patient when script is ready to /ship.**)

## 2020-06-08 DIAGNOSIS — J45.40 MODERATE PERSISTENT ASTHMA, UNCOMPLICATED: Chronic | ICD-10-CM

## 2020-06-08 NOTE — TELEPHONE ENCOUNTER
Routing refill request to provider for review/approval because:    LOV:  10/21/2019 with Recommended return in 1 year (10/21/2020)  ACT:  21      Inhaled Steroids Protocol [id number]/08/2020 08:29 AM   Asthma control assessment score within normal limits in last 6 months Protocol Details    Recent (6 mo) or future (30 days) visit within the authorizing provider's specialty      Libertad Pabon RN

## 2020-06-08 NOTE — TELEPHONE ENCOUNTER
Requested Prescriptions   Pending Prescriptions Disp Refills     beclomethasone HFA (QVAR REDIHALER) 80 MCG/ACT inhaler 3 Inhaler 1     Sig: Inhale 2 puffs into the lungs 3 times daily In the yellow and red zones only of asthma action plan       There is no refill protocol information for this order        Last office visit: 10/21/2019 with prescribing provider:  Dr. Leiva   Future Office Visit:      Denise Behrendt  Specialty CSS

## 2020-09-18 ENCOUNTER — OFFICE VISIT (OUTPATIENT)
Dept: ALLERGY | Facility: CLINIC | Age: 60
End: 2020-09-18
Payer: COMMERCIAL

## 2020-09-18 VITALS
HEART RATE: 80 BPM | BODY MASS INDEX: 34.22 KG/M2 | DIASTOLIC BLOOD PRESSURE: 85 MMHG | SYSTOLIC BLOOD PRESSURE: 125 MMHG | TEMPERATURE: 97.7 F | WEIGHT: 232.14 LBS | OXYGEN SATURATION: 95 %

## 2020-09-18 DIAGNOSIS — J31.0 CHRONIC RHINITIS: Primary | ICD-10-CM

## 2020-09-18 DIAGNOSIS — J32.8 OTHER CHRONIC SINUSITIS: ICD-10-CM

## 2020-09-18 DIAGNOSIS — J45.40 MODERATE PERSISTENT ASTHMA, UNCOMPLICATED: Chronic | ICD-10-CM

## 2020-09-18 DIAGNOSIS — D80.4 IGM DEFICIENCY (H): ICD-10-CM

## 2020-09-18 PROCEDURE — 99214 OFFICE O/P EST MOD 30 MIN: CPT | Performed by: ALLERGY & IMMUNOLOGY

## 2020-09-18 RX ORDER — AZELASTINE 1 MG/ML
1 SPRAY, METERED NASAL 2 TIMES DAILY
Qty: 90 ML | Refills: 2 | Status: SHIPPED | OUTPATIENT
Start: 2020-09-18 | End: 2021-02-23

## 2020-09-18 RX ORDER — ALBUTEROL SULFATE 90 UG/1
2 AEROSOL, METERED RESPIRATORY (INHALATION) EVERY 4 HOURS PRN
Qty: 1 INHALER | Refills: 11 | Status: SHIPPED | OUTPATIENT
Start: 2020-09-18 | End: 2021-10-14

## 2020-09-18 RX ORDER — FLUNISOLIDE 0.25 MG/ML
2 SOLUTION NASAL 2 TIMES DAILY
Qty: 3 BOTTLE | Refills: 3 | Status: SHIPPED | OUTPATIENT
Start: 2020-09-18 | End: 2021-08-31

## 2020-09-18 RX ORDER — BUDESONIDE 0.5 MG/2ML
INHALANT ORAL
Qty: 60 AMPULE | Refills: 2 | Status: SHIPPED | OUTPATIENT
Start: 2020-09-18 | End: 2020-10-12

## 2020-09-18 RX ORDER — AZITHROMYCIN 250 MG/1
TABLET, FILM COATED ORAL
Qty: 36 TABLET | Refills: 3 | Status: SHIPPED | OUTPATIENT
Start: 2020-09-18 | End: 2021-08-31

## 2020-09-18 ASSESSMENT — ENCOUNTER SYMPTOMS
SHORTNESS OF BREATH: 0
RHINORRHEA: 0
EYE DISCHARGE: 0
ARTHRALGIAS: 0
SINUS PRESSURE: 1
COUGH: 0
JOINT SWELLING: 0
VOMITING: 0
HEADACHES: 0
ACTIVITY CHANGE: 0
MYALGIAS: 0
DIARRHEA: 0
ADENOPATHY: 0
CHEST TIGHTNESS: 0
FACIAL SWELLING: 0
NAUSEA: 0
WHEEZING: 0
SORE THROAT: 1
FATIGUE: 0
EYE REDNESS: 0
EYE ITCHING: 0
FEVER: 0

## 2020-09-18 NOTE — LETTER
9/18/2020         RE: Markell Ramsey  [address] [facility] [id number]        Dear Colleague,    Thank you for referring your patient, Markell Ramsey, to the [facility]. Please see a copy of my visit note below.    SUBJECTIVE:                                                                   Markell Ramsey presents today to our Allergy Clinic at [facility]; he is being seen for a follow up visit.  he is a 60 year old male with moderate persistent asthma, allergic rhinitis, recurrent sinus infection/bronchitis and decreased IgM.     November 2017:Normal CH 50. Normal total IgE.  Normal total IgG.  Normal IgG subclasses.   Normal total IgA level. Decreased IgM level on multiple occasions. Normal T and B cells (flow cytometry).  20/22 protective pneumococcal antibody levels. Protective tetanus antibody level.      SPT in 2007 was positive to molds, tree pollen, grass mix, and ragweed. IDs were positive for cat, roaches, dog, mite. Aspergillus mold, and marsh elder. Started by Dr. Whittington on allergen IT in 2007,  [facility] Whittington used to practice before. He received allergen immunotherapy on and off until 2010, which he found partially helpful.   Sinus CT in 2013, with mild-moderate sinus disease, and DNS on the left. The patient confirms nasal congestion L>R.  IMPRESSION:  Mild/moderate mucosal thickening in the paranasal sinuses as described above.  Slight worsening of the right maxillary sinus and worsening of the right frontal sinus since the previous exam, but clearing of the left sphenoid sinus.    He had some issues in Winter, when he had presumptive flu, because his daughter was diagnosed with it. He added QVAR to Breo and didn't need prednisone. Otherwise, he has been  7/7 days compliant with his controller medications (Breo 100/25 mcg 1 puff once daily and montelukast 10 mg by mouth once daily). QVAR is the Yellow Zone medicine. He hardly  ever uses albuterol. Last use > 1 month ago. He is waking up less than twice per month due to chest symptoms.   No ED/PCP/urgent care/other specialist visits for asthma flare since the last visit. The patient denies current chest tightness, cough, wheeze or shortness of breath.   He takes azithromycin 250 mg by mouth once daily three times a week, and he is doing well. No bronchitis since the last visit. No acute sinus symptoms that required treatment with antibiotic.   He has been using flunisolide 2 sprays in each nostril twice daily and azelastine as needed regarding his allergic rhinitis. He started using azelastine last week. His daughter got a rabbit in June -July.   He complains of nasal congestion without rhinorrhea. He may wake up in the middle of the night and use nasal saline. He has slight headaches 1/10, and periorbital pressure. He has a slight postnasal drip.       Patient Active Problem List   Diagnosis     Gastroesophageal reflux disease without esophagitis     Non morbid obesity due to excess calories     IgM deficiency (H)     Moderate persistent asthma, uncomplicated     Essential hypertension     Hyperlipidemia LDL goal <130     DNS (deviated nasal septum)     Other chronic rhinitis     Pre-diabetes       Past Medical History:   Diagnosis Date     Acute bronchitis      Allergic rhinitis, cause unspecified 9/23/2008     Allergic rhinitis, unspecified allergic rhinitis type 9/23/2008     Problem list name updated by automated process. Provider to review     Esophageal reflux      Pneumonia 2/24/2013    MRSA, required hospitalization     Unspecified asthma(493.90)       Problem (# of Occurrences) Relation (Name,Age of Onset)    Allergies (1) Mother: asthma    Cancer (1) Father: lung    Respiratory (3) Sister: asthma, Son (Wisam): Upper Resp infections, Son (David): asthma        Past Surgical History:   Procedure Laterality Date     C APPENDECTOMY  08/2006     SURGICAL HISTORY OF -   2/23/06    L4-5  microdiscectomy     TONSILLECTOMY & ADENOIDECTOMY       Social History     Socioeconomic History     Marital status:      Spouse name: None     Number of children: None     Years of education: None     Highest education level: None   Occupational History     None   Social Needs     Financial resource strain: None     Food insecurity     Worry: None     Inability: None     Transportation needs     Medical: None     Non-medical: None   Tobacco Use     Smoking status: Never Smoker     Smokeless tobacco: Never Used   Substance and Sexual Activity     Alcohol use: Yes     Comment: rare     Drug use: No     Sexual activity: Yes     Partners: Female   Lifestyle     Physical activity     Days per week: None     Minutes per session: None     Stress: None   Relationships     Social connections     Talks on phone: None     Gets together: None     Attends [employer] service: None     Active member of club or organization: None     Attends meetings of clubs or organizations: None     Relationship status: None     Intimate partner violence     Fear of current or ex partner: None     Emotionally abused: None     Physically abused: None     Forced sexual activity: None   Other Topics Concern     Parent/sibling w/ CABG, MI or angioplasty before 65F 55M? Not Asked   Social History Narrative    September 18, 2020    ENVIRONMENTAL HISTORY: The family lives in a [facility] [place]. The home is heated with a forced air and gas furnace. They does have central air conditioning. The patient's bedroom is furnished with hard naseem in bedroom, allergen mattress cover and allergen pillowcase cover.  Has 1 rabbit  inside the house. There is no history of cockroach or mice infestation. There are no smokers in the house.  The house does not have a damp basement.            Review of Systems   Constitutional: Negative for activity change, fatigue and fever.   HENT: Positive for congestion, postnasal drip, sinus pressure and  sore throat (intermittently). Negative for dental problem, ear pain, facial swelling, nosebleeds, rhinorrhea and sneezing.    Eyes: Negative for discharge, redness and itching.   Respiratory: Negative for cough, chest tightness, shortness of breath and wheezing.    Cardiovascular: Negative for chest pain.   Gastrointestinal: Negative for diarrhea, nausea and vomiting.   Musculoskeletal: Negative for arthralgias, joint swelling and myalgias.   Skin: Negative for rash.   Neurological: Negative for headaches.   Hematological: Negative for adenopathy.   Psychiatric/Behavioral: Negative for behavioral problems and self-injury.           Current Outpatient Medications:      albuterol (VENTOLIN HFA) 108 (90 Base) MCG/ACT inhaler, Inhale 2 puffs into the lungs every 4 hours as needed for shortness of breath / dyspnea Take before exercise., Disp: 1 Inhaler, Rfl: 11     atorvastatin (LIPITOR) 10 MG tablet, Take 1 tablet (10 mg) by mouth daily, Disp: 90 tablet, Rfl: 3     azelastine (ASTELIN) 0.1 % nasal spray, Spray 1 spray into both nostrils 2 times daily, Disp: 90 mL, Rfl: 2     azithromycin (ZITHROMAX) 250 MG tablet, Take one tablet by mouth once daily on Mondays, Wednesdays and Fridays., Disp: 36 tablet, Rfl: 3     budesonide (PULMICORT) 0.5 MG/2ML neb solution, Mix respule of 0.5mg/2 mL budesonide vial in 8oz normal saline sinus rinse bottle. Irrigate each nostril with half of the bottle twice daily, Disp: 60 ampule, Rfl: 2     ESOMEPRAZOLE MAGNESIUM PO, Take 20 mg by mouth every morning (before breakfast), Disp: , Rfl:      flunisolide (NASALIDE) 25 MCG/ACT (0.025%) SOLN spray, Spray 2 sprays into both nostrils 2 times daily, Disp: 3 Bottle, Rfl: 3     fluticasone-vilanterol (BREO ELLIPTA) 100-25 MCG/INH inhaler, Inhale 1 puff into the lungs daily, Disp: 3 Inhaler, Rfl: 2     lisinopril-hydrochlorothiazide (PRINZIDE/ZESTORETIC) 10-12.5 MG tablet, Take 1 tablet by mouth daily, Disp: 90 tablet, Rfl: 3     montelukast  (SINGULAIR) 10 MG tablet, Take 1 tablet (10 mg) by mouth At Bedtime, Disp: 90 tablet, Rfl: 3     beclomethasone HFA (QVAR REDIHALER) 80 MCG/ACT inhaler, Inhale 2 puffs into the lungs 3 times daily In the yellow and red zones only of asthma action plan (Patient not taking: Reported on 9/18/2020), Disp: 3 Inhaler, Rfl: 1     ipratropium - albuterol 0.5 mg/2.5 mg/3 mL (DUONEB) 0.5-2.5 (3) MG/3ML neb solution, Take 1 vial (3 mLs) by nebulization every 6 hours as needed for shortness of breath / dyspnea or wheezing (Patient not taking: Reported on 10/16/2018), Disp: 30 vial, Rfl: 1     Respiratory Therapy Supplies (NEBULIZER/TUBING/MOUTHPIECE) KIT, use with nebulizer (Patient not taking: Reported on 10/16/2018), Disp: 1 kit, Rfl: 11  Immunization History   Administered Date(s) Administered     Influenza (H1N1) 01/11/2010     Influenza (IIV3) PF 11/02/1998, 12/19/2006, 10/29/2008, 08/26/2009, 10/05/2010, 01/04/2013     Influenza Quad, Recombinant, p-free (RIV4) 10/21/2019     Influenza Vaccine IM > 6 months Valent IIV4 10/23/2015, 09/21/2017, 09/06/2018     Influenza Vaccine IM Ages 6-35 Months 4 Valent (PF) 01/30/2014     Pneumococcal 23 valent 01/29/2016     TD (ADULT, 7+) 12/02/2004     TDAP Vaccine (Adacel) 11/18/2008     Td (Adult), Adsorbed 09/29/1997     Varicella 10/15/2002, 11/11/2002     No Known Allergies  OBJECTIVE:                                                                 /85 (BP Location: Left arm, Patient Position: Sitting, Cuff Size: Adult Large)   Pulse 80   Temp 97.7  F (36.5  C) (Tympanic)   Wt 105.3 kg (232 lb 2.3 oz)   SpO2 95%   BMI 34.22 kg/m          Physical Exam  Vitals signs and nursing note reviewed.   Constitutional:       General: He is not in acute distress.     Appearance: He is obese. He is not diaphoretic.   HENT:      Head: Normocephalic and atraumatic.      Right Ear: Tympanic membrane, ear canal and external ear normal.      Left Ear: Tympanic membrane, ear canal and  external ear normal.      Nose: Septal deviation (Septal spur on the left noted) present. No mucosal edema or rhinorrhea.      Right Turbinates: Not enlarged, swollen or pale.      Left Turbinates: Not enlarged, swollen or pale.      Mouth/Throat:      Lips: Pink.      Mouth: Mucous membranes are moist.      Pharynx: Oropharynx is clear. No pharyngeal swelling, oropharyngeal exudate or posterior oropharyngeal erythema.   Eyes:      General:         Right eye: No discharge.         Left eye: No discharge.      Conjunctiva/sclera: Conjunctivae normal.   Pulmonary:      Effort: Pulmonary effort is normal. No respiratory distress.      Breath sounds: Normal breath sounds and air entry. No stridor, decreased air movement or transmitted upper airway sounds. No decreased breath sounds, wheezing, rhonchi or rales.   Musculoskeletal: Normal range of motion.   Lymphadenopathy:      Cervical: No cervical adenopathy.   Skin:     General: Skin is warm.      Capillary Refill: Capillary refill takes less than 2 seconds.   Neurological:      Mental Status: He is alert and oriented to person, place, and time.   Psychiatric:         Mood and Affect: Mood normal.         Behavior: Behavior normal.       WORKUP:   ACT Score:  25    ASSESSMENT/PLAN:    Chronic rhinitis  Other chronic sinusitis    He prefers to be tested for rabbit later, if continues having symptoms.  - Continue azelastine 2 sprays in each nostril twice daily as needed.  - He can either increase intranasal flunisolide  to 2 sprays 3-4 times daily, or use budesonide rinses twice daily.    - flunisolide (NASALIDE) 25 MCG/ACT (0.025%) SOLN spray  Dispense: 3 Bottle; Refill: 3  - azelastine (ASTELIN) 0.1 % nasal spray  Dispense: 90 mL; Refill: 2  - budesonide (PULMICORT) 0.5 MG/2ML neb solution  Dispense: 60 ampule; Refill: 2    IgM deficiency (H)  Manifested by bronchitis and sinus infections, especially in cold seasons.  He takes it on Mondays, Wednesdays, and Saturdays  and he is well controlled on this regimen. He rarely has sinus infections and/or bronchitis.  -Continue as is.    - azithromycin (ZITHROMAX) 250 MG tablet  Dispense: 36 tablet; Refill: 3    Moderate persistent asthma, uncomplicated  Currently well controlled with Breo Ellipta 100/25 mcg 1 puff once daily, montelukast 10 mg by mouth once daily and albuterol inhaler as needed.  He uses Qvar in the Yellow Zone.  -Continue as is.    - fluticasone-vilanterol (BREO ELLIPTA) 100-25 MCG/INH inhaler  Dispense: 3 Inhaler; Refill: 2  - albuterol (VENTOLIN HFA) 108 (90 Base) MCG/ACT inhaler  Dispense: 1 Inhaler; Refill: 11       Return in about 1 year (around 9/18/2021), or if symptoms worsen or fail to improve.    Thank you for allowing us to participate in the care of this patient. Please feel free to contact us if there are any questions or concerns about the patient.    Disclaimer: This note consists of symbols derived from keyboarding, dictation and/or voice recognition software. As a result, there may be errors in the script that have gone undetected. Please consider this when interpreting information found in this chart.    Jose Angel Leiva MD, REINA, KEO  Allergy, Asthma and Immunology    [facility]      Again, thank you for allowing me to participate in the care of your patient.        Sincerely,        Jose Angel Leiva MD

## 2020-09-18 NOTE — PROGRESS NOTES
SUBJECTIVE:                                                                   Markell Ramsey presents today to our Allergy Clinic at [facility]; he is being seen for a follow up visit.  he is a 60 year old male with moderate persistent asthma, allergic rhinitis, recurrent sinus infection/bronchitis and decreased IgM.     November 2017:Normal CH 50. Normal total IgE.  Normal total IgG.  Normal IgG subclasses.   Normal total IgA level. Decreased IgM level on multiple occasions. Normal T and B cells (flow cytometry).  20/22 protective pneumococcal antibody levels. Protective tetanus antibody level.      SPT in 2007 was positive to molds, tree pollen, grass mix, and ragweed. IDs were positive for cat, roaches, dog, mite. Aspergillus mold, and marsh elder. Started by Dr. Whittington on allergen IT in 2007,  [facility] Whittington used to practice before. He received allergen immunotherapy on and off until 2010, which he found partially helpful.   Sinus CT in 2013, with mild-moderate sinus disease, and DNS on the left. The patient confirms nasal congestion L>R.  IMPRESSION:  Mild/moderate mucosal thickening in the paranasal sinuses as described above.  Slight worsening of the right maxillary sinus and worsening of the right frontal sinus since the previous exam, but clearing of the left sphenoid sinus.    He had some issues in Winter, when he had presumptive flu, because his daughter was diagnosed with it. He added QVAR to Breo and didn't need prednisone. Otherwise, he has been  7/7 days compliant with his controller medications (Breo 100/25 mcg 1 puff once daily and montelukast 10 mg by mouth once daily). QVAR is the Yellow Zone medicine. He hardly ever uses albuterol. Last use > 1 month ago. He is waking up less than twice per month due to chest symptoms.   No ED/PCP/urgent care/other specialist visits for asthma flare since the last visit. The patient denies current chest  tightness, cough, wheeze or shortness of breath.   He takes azithromycin 250 mg by mouth once daily three times a week, and he is doing well. No bronchitis since the last visit. No acute sinus symptoms that required treatment with antibiotic.   He has been using flunisolide 2 sprays in each nostril twice daily and azelastine as needed regarding his allergic rhinitis. He started using azelastine last week. His daughter got a rabbit in June -July.   He complains of nasal congestion without rhinorrhea. He may wake up in the middle of the night and use nasal saline. He has slight headaches 1/10, and periorbital pressure. He has a slight postnasal drip.       Patient Active Problem List   Diagnosis     Gastroesophageal reflux disease without esophagitis     Non morbid obesity due to excess calories     IgM deficiency (H)     Moderate persistent asthma, uncomplicated     Essential hypertension     Hyperlipidemia LDL goal <130     DNS (deviated nasal septum)     Other chronic rhinitis     Pre-diabetes       Past Medical History:   Diagnosis Date     Acute bronchitis      Allergic rhinitis, cause unspecified 9/23/2008     Allergic rhinitis, unspecified allergic rhinitis type 9/23/2008     Problem list name updated by automated process. Provider to review     Esophageal reflux      Pneumonia 2/24/2013    MRSA, required hospitalization     Unspecified asthma(493.90)       Problem (# of Occurrences) Relation (Name,Age of Onset)    Allergies (1) Mother: asthma    Cancer (1) Father: lung    Respiratory (3) Sister: asthma, Son (Wisam): Upper Resp infections, Son (David): asthma        Past Surgical History:   Procedure Laterality Date     C APPENDECTOMY  08/2006     SURGICAL HISTORY OF -   2/23/06    L4-5 microdiscectomy     TONSILLECTOMY & ADENOIDECTOMY       Social History     Socioeconomic History     Marital status:      Spouse name: None     Number of children: None     Years of education: None     Highest education  level: None   Occupational History     None   Social Needs     Financial resource strain: None     Food insecurity     Worry: None     Inability: None     Transportation needs     Medical: None     Non-medical: None   Tobacco Use     Smoking status: Never Smoker     Smokeless tobacco: Never Used   Substance and Sexual Activity     Alcohol use: Yes     Comment: rare     Drug use: No     Sexual activity: Yes     Partners: Female   Lifestyle     Physical activity     Days per week: None     Minutes per session: None     Stress: None   Relationships     Social connections     Talks on phone: None     Gets together: None     Attends Faith service: None     Active member of club or organization: None     Attends meetings of clubs or organizations: None     Relationship status: None     Intimate partner violence     Fear of current or ex partner: None     Emotionally abused: None     Physically abused: None     Forced sexual activity: None   Other Topics Concern     Parent/sibling w/ CABG, MI or angioplasty before 65F 55M? Not Asked   Social History Narrative    September 18, 2020    ENVIRONMENTAL HISTORY: [place] The home is heated with a forced air and gas furnace. They does have central air conditioning. The patient's bedroom is furnished with hard naseem in bedroom, allergen mattress cover and allergen pillowcase cover.  Has 1 rabbit  inside the house. There is no history of cockroach or mice infestation. There are no smokers in the house.  The house does not have a damp basement.            Review of Systems   Constitutional: Negative for activity change, fatigue and fever.   HENT: Positive for congestion, postnasal drip, sinus pressure and sore throat (intermittently). Negative for dental problem, ear pain, facial swelling, nosebleeds, rhinorrhea and sneezing.    Eyes: Negative for discharge, redness and itching.   Respiratory: Negative for cough, chest tightness,  shortness of breath and wheezing.    Cardiovascular: Negative for chest pain.   Gastrointestinal: Negative for diarrhea, nausea and vomiting.   Musculoskeletal: Negative for arthralgias, joint swelling and myalgias.   Skin: Negative for rash.   Neurological: Negative for headaches.   Hematological: Negative for adenopathy.   Psychiatric/Behavioral: Negative for behavioral problems and self-injury.           Current Outpatient Medications:      albuterol (VENTOLIN HFA) 108 (90 Base) MCG/ACT inhaler, Inhale 2 puffs into the lungs every 4 hours as needed for shortness of breath / dyspnea Take before exercise., Disp: 1 Inhaler, Rfl: 11     atorvastatin (LIPITOR) 10 MG tablet, Take 1 tablet (10 mg) by mouth daily, Disp: 90 tablet, Rfl: 3     azelastine (ASTELIN) 0.1 % nasal spray, Spray 1 spray into both nostrils 2 times daily, Disp: 90 mL, Rfl: 2     azithromycin (ZITHROMAX) 250 MG tablet, Take one tablet by mouth once daily on Mondays, Wednesdays and Fridays., Disp: 36 tablet, Rfl: 3     budesonide (PULMICORT) 0.5 MG/2ML neb solution, Mix respule of 0.5mg/2 mL budesonide vial in 8oz normal saline sinus rinse bottle. Irrigate each nostril with half of the bottle twice daily, Disp: 60 ampule, Rfl: 2     ESOMEPRAZOLE MAGNESIUM PO, Take 20 mg by mouth every morning (before breakfast), Disp: , Rfl:      flunisolide (NASALIDE) 25 MCG/ACT (0.025%) SOLN spray, Spray 2 sprays into both nostrils 2 times daily, Disp: 3 Bottle, Rfl: 3     fluticasone-vilanterol (BREO ELLIPTA) 100-25 MCG/INH inhaler, Inhale 1 puff into the lungs daily, Disp: 3 Inhaler, Rfl: 2     lisinopril-hydrochlorothiazide (PRINZIDE/ZESTORETIC) 10-12.5 MG tablet, Take 1 tablet by mouth daily, Disp: 90 tablet, Rfl: 3     montelukast (SINGULAIR) 10 MG tablet, Take 1 tablet (10 mg) by mouth At Bedtime, Disp: 90 tablet, Rfl: 3     beclomethasone HFA (QVAR REDIHALER) 80 MCG/ACT inhaler, Inhale 2 puffs into the lungs 3 times daily In the yellow and red zones only of  asthma action plan (Patient not taking: Reported on 9/18/2020), Disp: 3 Inhaler, Rfl: 1     ipratropium - albuterol 0.5 mg/2.5 mg/3 mL (DUONEB) 0.5-2.5 (3) MG/3ML neb solution, Take 1 vial (3 mLs) by nebulization every 6 hours as needed for shortness of breath / dyspnea or wheezing (Patient not taking: Reported on 10/16/2018), Disp: 30 vial, Rfl: 1     Respiratory Therapy Supplies (NEBULIZER/TUBING/MOUTHPIECE) KIT, use with nebulizer (Patient not taking: Reported on 10/16/2018), Disp: 1 kit, Rfl: 11  Immunization History   Administered Date(s) Administered     Influenza (H1N1) 01/11/2010     Influenza (IIV3) PF 11/02/1998, 12/19/2006, 10/29/2008, 08/26/2009, 10/05/2010, 01/04/2013     Influenza Quad, Recombinant, p-free (RIV4) 10/21/2019     Influenza Vaccine IM > 6 months Valent IIV4 10/23/2015, 09/21/2017, 09/06/2018     Influenza Vaccine IM Ages 6-35 Months 4 Valent (PF) 01/30/2014     Pneumococcal 23 valent 01/29/2016     TD (ADULT, 7+) 12/02/2004     TDAP Vaccine (Adacel) 11/18/2008     Td (Adult), Adsorbed 09/29/1997     Varicella 10/15/2002, 11/11/2002     No Known Allergies  OBJECTIVE:                                                                 /85 (BP Location: Left arm, Patient Position: Sitting, Cuff Size: Adult Large)   Pulse 80   Temp 97.7  F (36.5  C) (Tympanic)   Wt 105.3 kg (232 lb 2.3 oz)   SpO2 95%   BMI 34.22 kg/m          Physical Exam  Vitals signs and nursing note reviewed.   Constitutional:       General: He is not in acute distress.     Appearance: He is obese. He is not diaphoretic.   HENT:      Head: Normocephalic and atraumatic.      Right Ear: Tympanic membrane, ear canal and external ear normal.      Left Ear: Tympanic membrane, ear canal and external ear normal.      Nose: Septal deviation (Septal spur on the left noted) present. No mucosal edema or rhinorrhea.      Right Turbinates: Not enlarged, swollen or pale.      Left Turbinates: Not enlarged, swollen or pale.       Mouth/Throat:      Lips: Pink.      Mouth: Mucous membranes are moist.      Pharynx: Oropharynx is clear. No pharyngeal swelling, oropharyngeal exudate or posterior oropharyngeal erythema.   Eyes:      General:         Right eye: No discharge.         Left eye: No discharge.      Conjunctiva/sclera: Conjunctivae normal.   Pulmonary:      Effort: Pulmonary effort is normal. No respiratory distress.      Breath sounds: Normal breath sounds and air entry. No stridor, decreased air movement or transmitted upper airway sounds. No decreased breath sounds, wheezing, rhonchi or rales.   Musculoskeletal: Normal range of motion.   Lymphadenopathy:      Cervical: No cervical adenopathy.   Skin:     General: Skin is warm.      Capillary Refill: Capillary refill takes less than 2 seconds.   Neurological:      Mental Status: He is alert and oriented to person, place, and time.   Psychiatric:         Mood and Affect: Mood normal.         Behavior: Behavior normal.       WORKUP:   ACT Score:  25    ASSESSMENT/PLAN:    Chronic rhinitis  Other chronic sinusitis    He prefers to be tested for rabbit later, if continues having symptoms.  - Continue azelastine 2 sprays in each nostril twice daily as needed.  - He can either increase intranasal flunisolide  to 2 sprays 3-4 times daily, or use budesonide rinses twice daily.    - flunisolide (NASALIDE) 25 MCG/ACT (0.025%) SOLN spray  Dispense: 3 Bottle; Refill: 3  - azelastine (ASTELIN) 0.1 % nasal spray  Dispense: 90 mL; Refill: 2  - budesonide (PULMICORT) 0.5 MG/2ML neb solution  Dispense: 60 ampule; Refill: 2    IgM deficiency (H)  Manifested by bronchitis and sinus infections, especially in cold seasons.  He takes it on Mondays, Wednesdays, and Saturdays and he is well controlled on this regimen. He rarely has sinus infections and/or bronchitis.  -Continue as is.    - azithromycin (ZITHROMAX) 250 MG tablet  Dispense: 36 tablet; Refill: 3    Moderate persistent asthma,  uncomplicated  Currently well controlled with Breo Ellipta 100/25 mcg 1 puff once daily, montelukast 10 mg by mouth once daily and albuterol inhaler as needed.  He uses Qvar in the Yellow Zone.  -Continue as is.    - fluticasone-vilanterol (BREO ELLIPTA) 100-25 MCG/INH inhaler  Dispense: 3 Inhaler; Refill: 2  - albuterol (VENTOLIN HFA) 108 (90 Base) MCG/ACT inhaler  Dispense: 1 Inhaler; Refill: 11       Return in about 1 year (around 9/18/2021), or if symptoms worsen or fail to improve.    Thank you for allowing us to participate in the care of this patient. Please feel free to contact us if there are any questions or concerns about the patient.    Disclaimer: This note consists of symbols derived from keyboarding, dictation and/or voice recognition software. As a result, there may be errors in the script that have gone undetected. Please consider this when interpreting information found in this chart.    Jose Angel Leiva MD, FAAAAI, FACJOSE MANUELI  Allergy, Asthma and Immunology    [facility] [place]

## 2020-09-18 NOTE — PATIENT INSTRUCTIONS
Continue the same asthma management.  Continue azithromycin as is.   Continue azelastine as is.  While you have symptoms, increase flunisolide to 2 sprays in each nostril up to 3-4 times a day.     If that doesn't work, or you have side effects, stop flunisolide and use use saline rinses with budesonide.

## 2020-09-19 ASSESSMENT — ASTHMA QUESTIONNAIRES: ACT_TOTALSCORE: 25

## 2020-09-29 DIAGNOSIS — E78.5 HYPERLIPIDEMIA LDL GOAL <130: ICD-10-CM

## 2020-09-29 DIAGNOSIS — J45.40 MODERATE PERSISTENT ASTHMA, UNCOMPLICATED: Chronic | ICD-10-CM

## 2020-09-29 DIAGNOSIS — I10 ESSENTIAL HYPERTENSION: ICD-10-CM

## 2020-09-29 RX ORDER — MONTELUKAST SODIUM 10 MG/1
10 TABLET ORAL AT BEDTIME
Qty: 90 TABLET | Refills: 3 | Status: SHIPPED | OUTPATIENT
Start: 2020-09-29 | End: 2021-09-23

## 2020-09-29 NOTE — TELEPHONE ENCOUNTER
Requested Prescriptions   Pending Prescriptions Disp Refills     montelukast (SINGULAIR) 10 MG tablet 90 tablet 3     Sig: Take 1 tablet (10 mg) by mouth At Bedtime       There is no refill protocol information for this order        Last Written Prescription Date:    Last Fill Quantity: ,  # refills:    Last office visit: 9/18/2020 with prescribing provider:  Cali   Future Office Visit:

## 2020-09-29 NOTE — TELEPHONE ENCOUNTER
Prescription approved per [facility] [place].    Delphine LU RN  Specialty/Allergy Clinics

## 2020-09-29 NOTE — TELEPHONE ENCOUNTER
"Requested Prescriptions   Pending Prescriptions Disp Refills     atorvastatin (LIPITOR) 10 MG tablet [Pharmacy Med Name: ATORVASTATIN 10 MG TABLET] 90 tablet 3     Sig: TAKE 1 TABLET BY MOUTH EVERY DAY       Statins Protocol Passed - 9/29/2020 12:36 AM        Passed - LDL on file in past 12 months     Recent Labs   Lab Test 10/31/19  0626   LDL 99             Passed - No abnormal creatine kinase in past 12 months     No lab results found.             Passed - Recent (12 mo) or future (30 days) visit within the authorizing provider's specialty     Patient has had an office visit with the authorizing provider or a provider within the authorizing providers department within the previous 12 mos or has a future within next 30 days. See \"Patient Info\" tab in inbasket, or \"Choose Columns\" in Meds & Orders section of the refill encounter.              Passed - Medication is active on med list        Passed - Patient is age 18 or older           lisinopril-hydrochlorothiazide (ZESTORETIC) 10-12.5 MG tablet [Pharmacy Med Name: LISINOPRIL-HCTZ 10-12.5 MG TAB] 90 tablet 3     Sig: TAKE 1 TABLET BY MOUTH EVERY DAY       Diuretics (Including Combos) Protocol Passed - 9/29/2020 12:36 AM        Passed - Blood pressure under 140/90 in past 12 months     BP Readings from Last 3 Encounters:   09/18/20 125/85   10/30/19 118/80   10/21/19 115/74                 Passed - Recent (12 mo) or future (30 days) visit within the authorizing provider's specialty     Patient has had an office visit with the authorizing provider or a provider within the authorizing providers department within the previous 12 mos or has a future within next 30 days. See \"Patient Info\" tab in inbasket, or \"Choose Columns\" in Meds & Orders section of the refill encounter.              Passed - Medication is active on med list        Passed - Patient is age 18 or older        Passed - Normal serum creatinine on file in past 12 months     Recent Labs   Lab Test " "10/31/19  0626   CR 1.09              Passed - Normal serum potassium on file in past 12 months     Recent Labs   Lab Test 10/31/19  0626   POTASSIUM 3.5                    Passed - Normal serum sodium on file in past 12 months     Recent Labs   Lab Test 10/31/19  0626                ACE Inhibitors (Including Combos) Protocol Passed - 9/29/2020 12:36 AM        Passed - Blood pressure under 140/90 in past 12 months     BP Readings from Last 3 Encounters:   09/18/20 125/85   10/30/19 118/80   10/21/19 115/74                 Passed - Recent (12 mo) or future (30 days) visit within the authorizing provider's specialty     Patient has had an office visit with the authorizing provider or a provider within the authorizing providers department within the previous 12 mos or has a future within next 30 days. See \"Patient Info\" tab in inbasket, or \"Choose Columns\" in Meds & Orders section of the refill encounter.              Passed - Medication is active on med list        Passed - Patient is age 18 or older        Passed - Normal serum creatinine on file in past 12 months     Recent Labs   Lab Test 10/31/19  0626   CR 1.09       Ok to refill medication if creatinine is low          Passed - Normal serum potassium on file in past 12 months     Recent Labs   Lab Test 10/31/19  0626   POTASSIUM 3.5                  "

## 2020-09-30 RX ORDER — LISINOPRIL/HYDROCHLOROTHIAZIDE 10-12.5 MG
TABLET ORAL
Qty: 90 TABLET | Refills: 0 | Status: SHIPPED | OUTPATIENT
Start: 2020-09-30 | End: 2020-11-19

## 2020-09-30 RX ORDER — ATORVASTATIN CALCIUM 10 MG/1
TABLET, FILM COATED ORAL
Qty: 90 TABLET | Refills: 0 | Status: SHIPPED | OUTPATIENT
Start: 2020-09-30 | End: 2020-11-19

## 2020-10-09 ENCOUNTER — TELEPHONE (OUTPATIENT)
Dept: ALLERGY | Facility: CLINIC | Age: 60
End: 2020-10-09

## 2020-10-09 NOTE — TELEPHONE ENCOUNTER
Reason for Call:  Other prescription    Detailed comments: [facility] faxed request stating: is pt using 1 vial in 8oz of saline daily or 2 vials, he says 2, could you clarify these directions?    Phone Number Patient can be reached at: Other phone number:  [phone]*    Best Time: any     Can we leave a detailed message on this number? YES    Call taken on 10/9/2020 at 9:16 AM by Karoline Sosa

## 2020-10-09 NOTE — TELEPHONE ENCOUNTER
"Called and spoke with \"Pharmacist\", no name given. Clarified dosing below. No questions.     Delphine LU RN  Specialty/Allergy Clinics      "

## 2020-10-09 NOTE — TELEPHONE ENCOUNTER
Mix respule of 0.5mg/2 mL budesonide vial in 8oz normal saline sinus rinse bottle. Irrigate each nostril with half of the bottle twice daily.    1 vial in 8oz of saline in the morning and repeat the same in the evening.  Jose Angel Leiva MD

## 2020-10-12 DIAGNOSIS — J32.8 OTHER CHRONIC SINUSITIS: ICD-10-CM

## 2020-10-12 RX ORDER — BUDESONIDE 0.5 MG/2ML
INHALANT ORAL
Qty: 60 AMPULE | Refills: 11 | Status: SHIPPED | OUTPATIENT
Start: 2020-10-12 | End: 2021-10-08

## 2020-10-12 NOTE — TELEPHONE ENCOUNTER
Requested Prescriptions   Pending Prescriptions Disp Refills     budesonide (PULMICORT) 0.5 MG/2ML neb solution 60 ampule 2     Sig: Mix respule of 0.5mg/2 mL budesonide vial in 8oz normal saline sinus rinse bottle. Irrigate each nostril with half of the bottle twice daily       There is no refill protocol information for this order        Last office visit: 9/18/2020 with prescribing provider:  Dr. Leiva   Future Office Visit:          Denise Behrendt  Specialty CSS

## 2020-10-12 NOTE — TELEPHONE ENCOUNTER
Prescription approved per [facility] Refill Protocol.  1 year supply sent  LOV:  9/18/2020, return in 1 year (9/18/2021)    Libertad Pabon RN

## 2020-11-18 ENCOUNTER — ALLIED HEALTH/NURSE VISIT (OUTPATIENT)
Dept: FAMILY MEDICINE | Facility: CLINIC | Age: 60
End: 2020-11-18
Payer: COMMERCIAL

## 2020-11-18 VITALS — DIASTOLIC BLOOD PRESSURE: 82 MMHG | SYSTOLIC BLOOD PRESSURE: 122 MMHG

## 2020-11-18 DIAGNOSIS — Z23 NEED FOR PROPHYLACTIC VACCINATION AND INOCULATION AGAINST INFLUENZA: Primary | ICD-10-CM

## 2020-11-18 PROCEDURE — 99207 PR NO CHARGE NURSE ONLY: CPT

## 2020-11-18 PROCEDURE — 90471 IMMUNIZATION ADMIN: CPT

## 2020-11-18 PROCEDURE — 90682 RIV4 VACC RECOMBINANT DNA IM: CPT

## 2020-11-19 ENCOUNTER — TELEPHONE (OUTPATIENT)
Dept: FAMILY MEDICINE | Facility: CLINIC | Age: 60
End: 2020-11-19

## 2020-11-19 ENCOUNTER — VIRTUAL VISIT (OUTPATIENT)
Dept: FAMILY MEDICINE | Facility: CLINIC | Age: 60
End: 2020-11-19
Payer: COMMERCIAL

## 2020-11-19 DIAGNOSIS — I10 ESSENTIAL HYPERTENSION: ICD-10-CM

## 2020-11-19 DIAGNOSIS — Z12.11 SCREEN FOR COLON CANCER: Primary | ICD-10-CM

## 2020-11-19 DIAGNOSIS — E78.5 HYPERLIPIDEMIA LDL GOAL <130: ICD-10-CM

## 2020-11-19 PROCEDURE — 99214 OFFICE O/P EST MOD 30 MIN: CPT | Mod: TEL | Performed by: NURSE PRACTITIONER

## 2020-11-19 RX ORDER — ATORVASTATIN CALCIUM 10 MG/1
10 TABLET, FILM COATED ORAL DAILY
Qty: 90 TABLET | Refills: 3 | Status: SHIPPED | OUTPATIENT
Start: 2020-11-19 | End: 2021-11-19

## 2020-11-19 RX ORDER — LISINOPRIL/HYDROCHLOROTHIAZIDE 10-12.5 MG
1 TABLET ORAL DAILY
Qty: 90 TABLET | Refills: 3 | Status: SHIPPED | OUTPATIENT
Start: 2020-11-19 | End: 2021-11-19

## 2020-11-19 NOTE — PROGRESS NOTES
"Markell Ramsey is a 60 year old male who is being evaluated via a billable telephone visit.      The patient has been notified of following:     \"This telephone visit will be conducted via a call between you and your physician/provider. We have found that certain health care needs can be provided without the need for a physical exam.  This service lets us provide the care you need with a short phone conversation.  If a prescription is necessary we can send it directly to your pharmacy.  If lab work is needed we can place an order for that and you can then stop by our lab to have the test done at a later time.    Telephone visits are billed at different rates depending on your insurance coverage. During this emergency period, for some insurers they may be billed the same as an in-person visit.  Please reach out to your insurance provider with any questions.    If during the course of the call the physician/provider feels a telephone visit is not appropriate, you will not be charged for this service.\"    Patient has given verbal consent for Telephone visit?  Yes    What phone number would you like to be contacted at? [phone]    How would you like to obtain your AVS? Sylvester Hernandez     Markell Ramsey is a 60 year old male who presents via phone visit today for the following health issues:    HPI  Chief Complaint   Patient presents with     Hypertension     Refill Request     Lipids     Blood Draw     Patient will set lab up and come in fasting      Health Maintenance     Patient is due for fit test will mail out      Hyperlipidemia Follow-Up      Are you regularly taking any medication or supplement to lower your cholesterol?   Yes- lipitor     Are you having muscle aches or other side effects that you think could be caused by your cholesterol lowering medication?  No    Hypertension Follow-up      Do you check your blood pressure regularly outside of the clinic? Yes     Are you following a low salt diet? No    Are " your blood pressures ever more than 140 on the top number (systolic) OR more   than 90 on the bottom number (diastolic), for example 140/90? No 122/80 yesterday 127/at the allergies       How many servings of fruits and vegetables do you eat daily?  2-3    On average, how many sweetened beverages do you drink each day (Examples: soda, juice, sweet tea, etc.  Do NOT count diet or artificially sweetened beverages)?   0    How many days per week do you exercise enough to make your heart beat faster? 3 or less    How many minutes a day do you exercise enough to make your heart beat faster? 9 or less    How many days per week do you miss taking your medication? 0    Medication Followup of all medications listed in      Taking Medication as prescribed: yes    Side Effects:  None    Medication Helping Symptoms:  yes           Review of Systems   Constitutional, HEENT, cardiovascular, pulmonary, GI, , musculoskeletal, neuro, skin, endocrine and psych systems are negative, except as otherwise noted.       Objective          Vitals:  No vitals were obtained today due to virtual visit.    healthy, alert and no distress  PSYCH: Alert and oriented times 3; coherent speech, normal   rate and volume, able to articulate logical thoughts, able   to abstract reason, no tangential thoughts, no hallucinations   or delusions  His affect is normal  RESP: No cough, no audible wheezing, able to talk in full sentences  Remainder of exam unable to be completed due to telephone visits              Assessment/Plan:    Assessment & Plan     Essential hypertension  controlled  - lisinopril-hydrochlorothiazide (ZESTORETIC) 10-12.5 MG tablet; Take 1 tablet by mouth daily  - Basic metabolic panel; Future    Hyperlipidemia LDL goal <130  Tolerating statine  - atorvastatin (LIPITOR) 10 MG tablet; Take 1 tablet (10 mg) by mouth daily  - ALT; Future  - Lipid panel reflex to direct LDL Fasting; Future    Screen for colon cancer  - Fecal  colorectal cancer screen (FIT); Future              No follow-ups on file.    ESTHER Hill CNP  [facility]    Phone call duration:  6 minutes

## 2020-11-19 NOTE — TELEPHONE ENCOUNTER
Panel Management Review      Patient has the following on his problem list:       Composite cancer screening  Chart review shows that this patient is due/due soon for the following   Summary:    Patient is due/failing the following:   FIT    Action needed:   Patient was mailed fir test on 11-19-20     Type of outreach:    Will call in 1 mo to see if it is completed     Questions for provider review:    None                                                                                                                                    Zeina Paz CMA     Chart routed to Care Team .

## 2020-11-25 DIAGNOSIS — Z12.11 SCREEN FOR COLON CANCER: ICD-10-CM

## 2020-11-25 LAB — HEMOCCULT STL QL IA: NEGATIVE

## 2020-11-25 PROCEDURE — 82274 ASSAY TEST FOR BLOOD FECAL: CPT | Performed by: NURSE PRACTITIONER

## 2020-11-29 ENCOUNTER — HEALTH MAINTENANCE LETTER (OUTPATIENT)
Age: 60
End: 2020-11-29

## 2020-12-09 NOTE — NURSING NOTE
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 C/o sinus sx       Initial /85 (BP Location: Left arm, Patient Position: Chair, Cuff Size: Adult Large)  Pulse 101  Temp 98.6  F (37  C) (Tympanic)  Ht 1.753 m (5' 9.02\")  Wt 102.3 kg (225 lb 8.5 oz)  SpO2 97%  BMI 33.29 kg/m2 Estimated body mass index is 33.29 kg/(m^2) as calculated from the following:    Height as of this encounter: 1.753 m (5' 9.02\").    Weight as of this encounter: 102.3 kg (225 lb 8.5 oz).  Medication Reconciliation: complete    " [General Appearance - Alert] : alert [General Appearance - In No Acute Distress] : in no acute distress [Oriented To Time, Place, And Person] : oriented to person, place, and time [Affect] : the affect was normal [Memory Recent] : recent memory was not impaired [Memory Remote] : remote memory was not impaired [Current Events] : adequate knowledge of current events [Vocabulary] : adequate range of vocabulary [Cranial Nerves Optic (II)] : visual acuity intact bilaterally,  visual fields full to confrontation, pupils equal round and reactive to light [Cranial Nerves Oculomotor (III)] : extraocular motion intact [Cranial Nerves Trigeminal (V)] : facial sensation intact symmetrically [Cranial Nerves Facial (VII)] : face symmetrical [Cranial Nerves Vestibulocochlear (VIII)] : hearing was intact bilaterally [Cranial Nerves Glossopharyngeal (IX)] : tongue and palate midline [Cranial Nerves Accessory (XI - Cranial And Spinal)] : head turning and shoulder shrug symmetric [Cranial Nerves Hypoglossal (XII)] : there was no tongue deviation with protrusion [Motor Tone] : muscle tone was normal in all four extremities [Motor Strength] : muscle strength was normal in all four extremities [Sensation Tactile Decrease] : light touch was intact [Sensation Pain / Temperature Decrease] : pain and temperature was intact [Sensation Vibration Decrease] : vibration was intact [Abnormal Walk] : normal gait [2+] : Ankle jerk left 2+ [Optic Disc Abnormality] : the optic disc were normal in size and color [Edema] : there was no peripheral edema [Involuntary Movements] : no involuntary movements were seen [Dysarthria] : no dysarthria [Aphasia] : no dysphasia/aphasia [Romberg's Sign] : Romberg's sign was negtive [Coordination - Dysmetria Impaired Finger-to-Nose Bilateral] : not present [Plantar Reflex Right Only] : normal on the right [Plantar Reflex Left Only] : normal on the left

## 2020-12-21 DIAGNOSIS — J45.40 MODERATE PERSISTENT ASTHMA, UNCOMPLICATED: Chronic | ICD-10-CM

## 2020-12-21 NOTE — TELEPHONE ENCOUNTER
That does is for Yellow and Red zone.  I would leave it the way it is.    Jose Angel Leiva MD

## 2020-12-21 NOTE — TELEPHONE ENCOUNTER
Routing refill request to provider for review/approval because:  Frequency exceeds recommended daily dose  LOV:  9/18/20, recommended 1 year follow-up    Libertad Pabon RN

## 2020-12-21 NOTE — TELEPHONE ENCOUNTER
Requested Prescriptions   Pending Prescriptions Disp Refills     beclomethasone HFA (QVAR REDIHALER) 80 MCG/ACT inhaler 3 Inhaler 1     Sig: Inhale 2 puffs into the lungs 3 times daily In the yellow and red zones only of asthma action plan       There is no refill protocol information for this order        Last office visit: 9/18/2020 with prescribing provider:  Dr. Leiva   Future Office Visit:          Denise Behrendt  Specialty CSS

## 2021-02-17 ENCOUNTER — MYC MEDICAL ADVICE (OUTPATIENT)
Dept: ALLERGY | Facility: CLINIC | Age: 61
End: 2021-02-17

## 2021-02-17 DIAGNOSIS — J45.40 MODERATE PERSISTENT ASTHMA, UNCOMPLICATED: Primary | ICD-10-CM

## 2021-02-17 NOTE — TELEPHONE ENCOUNTER
We can definitely switch to Advair 250/50 micrograms 2 puffs twice daily.  I will prescribe 1 month with 3 refills.  If the patient is doing well, we can transition to 90-day supply.    Jose Angel Leiva MD

## 2021-02-22 DIAGNOSIS — J31.0 CHRONIC RHINITIS: ICD-10-CM

## 2021-02-22 NOTE — TELEPHONE ENCOUNTER
Requested Prescriptions   Pending Prescriptions Disp Refills     azelastine (ASTELIN) 0.1 % nasal spray 90 mL 2     Sig: Spray 1 spray into both nostrils 2 times daily       There is no refill protocol information for this order        Last office visit: 9/18/2020 with prescribing provider:  Dr. Leiva   Future Office Visit:          Denise Behrendt  Specialty CSS

## 2021-02-23 RX ORDER — AZELASTINE 1 MG/ML
1 SPRAY, METERED NASAL 2 TIMES DAILY
Qty: 90 ML | Refills: 2 | Status: SHIPPED | OUTPATIENT
Start: 2021-02-23 | End: 2021-10-14

## 2021-02-23 NOTE — TELEPHONE ENCOUNTER
Prescription approved per [facility] Refill Protocol.    Delphine LU RN  Specialty/Allergy Clinics

## 2021-04-19 DIAGNOSIS — J45.40 MODERATE PERSISTENT ASTHMA, UNCOMPLICATED: ICD-10-CM

## 2021-04-19 NOTE — TELEPHONE ENCOUNTER
Requested Prescriptions   Pending Prescriptions Disp Refills     fluticasone-salmeterol (ADVAIR) 250-50 MCG/DOSE inhaler 1 each 3     Sig: Inhale 1 puff into the lungs every 12 hours       There is no refill protocol information for this order      Last Written Prescription Date:    Last Fill Quantity: ,  # refills:    Last office visit: 9/18/2020 with prescribing provider:     Future Office Visit:

## 2021-04-20 NOTE — TELEPHONE ENCOUNTER
Routing refill request to provider for review/approval because:  NO ACT on file in the last 6 months. LOV 9/18/2020 Return in about 1 year (around 9/18/2021)    Pt also requesting 90 day supply. Cue up to 90 day supply with 3 refills.    Delphine LU RN  Specialty/Allergy Clinics

## 2021-05-29 ENCOUNTER — RECORDS - HEALTHEAST (OUTPATIENT)
Dept: ADMINISTRATIVE | Facility: CLINIC | Age: 61
End: 2021-05-29

## 2021-08-31 DIAGNOSIS — J31.0 CHRONIC RHINITIS: ICD-10-CM

## 2021-08-31 DIAGNOSIS — D80.4 IGM DEFICIENCY (H): ICD-10-CM

## 2021-08-31 RX ORDER — AZITHROMYCIN 250 MG/1
TABLET, FILM COATED ORAL
Qty: 36 TABLET | Refills: 3 | Status: SHIPPED | OUTPATIENT
Start: 2021-08-31 | End: 2022-08-01

## 2021-08-31 RX ORDER — FLUNISOLIDE 0.25 MG/ML
2 SOLUTION NASAL 2 TIMES DAILY
Qty: 75 ML | Refills: 3 | Status: SHIPPED | OUTPATIENT
Start: 2021-08-31 | End: 2021-10-14

## 2021-08-31 NOTE — TELEPHONE ENCOUNTER
Routing refill request to provider for review/approval because:  Drug not on the [facility] refill protocol-zithromax    LOV: 9/18/2020, 1 year follow-up    Libertad Pabon RN

## 2021-08-31 NOTE — TELEPHONE ENCOUNTER
Requested Prescriptions   Pending Prescriptions Disp Refills     flunisolide (NASALIDE) 25 MCG/ACT (0.025%) SOLN spray       Sig: Spray 2 sprays into both nostrils 2 times daily       There is no refill protocol information for this order        azithromycin (ZITHROMAX) 250 MG tablet 36 tablet 3     Sig: Take one tablet by mouth once daily on Mondays, Wednesdays and Fridays.       There is no refill protocol information for this order        Last office visit: 9/18/2020 with prescribing provider:  Dr. Leiva   Future Office Visit:          Denise Behrendt  Specialty CSS

## 2021-09-01 NOTE — TELEPHONE ENCOUNTER
I refilled the meds.  However, the patient needs a follow-up appointment.  Please remind him.  Jose Angel Leiva MD

## 2021-09-01 NOTE — TELEPHONE ENCOUNTER
Pt has viewed communication.     Delphine LU RN  Specialty/Allergy Clinics    Last read by Markell Ramsey at 7:58 AM on 9/1/2021.

## 2021-09-23 DIAGNOSIS — J45.40 MODERATE PERSISTENT ASTHMA, UNCOMPLICATED: Chronic | ICD-10-CM

## 2021-09-23 RX ORDER — MONTELUKAST SODIUM 10 MG/1
10 TABLET ORAL AT BEDTIME
Qty: 90 TABLET | Refills: 3 | Status: SHIPPED | OUTPATIENT
Start: 2021-09-23 | End: 2022-09-19

## 2021-09-23 NOTE — TELEPHONE ENCOUNTER
Called and spoke with pt. Appointment scheduled.     Delphine LU RN  Specialty/Allergy Clinics

## 2021-09-23 NOTE — TELEPHONE ENCOUNTER
Requested Prescriptions   Pending Prescriptions Disp Refills     montelukast (SINGULAIR) 10 MG tablet 90 tablet 3     Sig: Take 1 tablet (10 mg) by mouth At Bedtime       There is no refill protocol information for this order        Last office visit: 9/18/2020 with prescribing provider:  Dr. Leiva    Future Office Visit:          [facility]ty [facility] CSS

## 2021-09-23 NOTE — TELEPHONE ENCOUNTER
Routing refill request to provider for review/approval because:  LOV 9/18/2020      Delphine LU RN  Specialty/Allergy Clinics

## 2021-09-23 NOTE — TELEPHONE ENCOUNTER
The refill provided.  Please tell the patient that he is due for a follow-up appointment.    Jose Angel Leiva MD

## 2021-09-25 ENCOUNTER — HEALTH MAINTENANCE LETTER (OUTPATIENT)
Age: 61
End: 2021-09-25

## 2021-10-08 DIAGNOSIS — J32.8 OTHER CHRONIC SINUSITIS: ICD-10-CM

## 2021-10-08 RX ORDER — BUDESONIDE 0.5 MG/2ML
INHALANT ORAL
Qty: 120 ML | Refills: 3 | Status: SHIPPED | OUTPATIENT
Start: 2021-10-08 | End: 2021-10-14

## 2021-10-08 NOTE — TELEPHONE ENCOUNTER
Routing refill request to provider for review/approval because:  No current ACT on file. Pt has appointment scheduled for follow up on 10/14.      Delphine LU RN  Specialty/Allergy Clinics

## 2021-10-14 ENCOUNTER — OFFICE VISIT (OUTPATIENT)
Dept: ALLERGY | Facility: CLINIC | Age: 61
End: 2021-10-14
Payer: COMMERCIAL

## 2021-10-14 VITALS
SYSTOLIC BLOOD PRESSURE: 131 MMHG | BODY MASS INDEX: 33.57 KG/M2 | OXYGEN SATURATION: 95 % | WEIGHT: 227.74 LBS | TEMPERATURE: 98.1 F | HEART RATE: 84 BPM | DIASTOLIC BLOOD PRESSURE: 86 MMHG

## 2021-10-14 DIAGNOSIS — J45.40 MODERATE PERSISTENT ASTHMA, UNCOMPLICATED: Primary | Chronic | ICD-10-CM

## 2021-10-14 DIAGNOSIS — J32.8 OTHER CHRONIC SINUSITIS: ICD-10-CM

## 2021-10-14 DIAGNOSIS — D80.4 IGM DEFICIENCY (H): ICD-10-CM

## 2021-10-14 DIAGNOSIS — L30.9 DERMATITIS: ICD-10-CM

## 2021-10-14 DIAGNOSIS — Z23 NEED FOR PROPHYLACTIC VACCINATION AND INOCULATION AGAINST INFLUENZA: ICD-10-CM

## 2021-10-14 DIAGNOSIS — J31.0 CHRONIC RHINITIS: ICD-10-CM

## 2021-10-14 PROCEDURE — 90471 IMMUNIZATION ADMIN: CPT | Performed by: ALLERGY & IMMUNOLOGY

## 2021-10-14 PROCEDURE — 90682 RIV4 VACC RECOMBINANT DNA IM: CPT | Performed by: ALLERGY & IMMUNOLOGY

## 2021-10-14 PROCEDURE — 99214 OFFICE O/P EST MOD 30 MIN: CPT | Mod: 25 | Performed by: ALLERGY & IMMUNOLOGY

## 2021-10-14 RX ORDER — ALBUTEROL SULFATE 90 UG/1
2-4 AEROSOL, METERED RESPIRATORY (INHALATION) EVERY 4 HOURS PRN
Qty: 18 G | Refills: 3 | Status: SHIPPED | OUTPATIENT
Start: 2021-10-14 | End: 2022-10-27

## 2021-10-14 RX ORDER — AZELASTINE 1 MG/ML
1 SPRAY, METERED NASAL 2 TIMES DAILY
Qty: 90 ML | Refills: 2 | Status: SHIPPED | OUTPATIENT
Start: 2021-10-14 | End: 2022-10-27

## 2021-10-14 RX ORDER — BUDESONIDE 0.25 MG/2ML
INHALANT ORAL
Qty: 120 ML | Refills: 3 | Status: SHIPPED | OUTPATIENT
Start: 2021-10-14 | End: 2022-06-27

## 2021-10-14 RX ORDER — TRIAMCINOLONE ACETONIDE 1 MG/G
OINTMENT TOPICAL
Qty: 80 G | Refills: 1 | Status: SHIPPED | OUTPATIENT
Start: 2021-10-14 | End: 2021-12-24

## 2021-10-14 ASSESSMENT — ENCOUNTER SYMPTOMS
RHINORRHEA: 0
DIARRHEA: 0
FACIAL SWELLING: 0
HEADACHES: 0
SINUS PRESSURE: 0
EYE REDNESS: 0
WHEEZING: 0
EYE ITCHING: 0
SHORTNESS OF BREATH: 0
VOMITING: 0
CHEST TIGHTNESS: 0
COUGH: 0
ADENOPATHY: 0
EYE DISCHARGE: 0
NAUSEA: 0

## 2021-10-14 NOTE — LETTER
My Asthma Action Plan  Name: Markell Ramsey   YOB: 1960  Date: 10/14/2021    My doctor: Jose Angel Leiva MD   My clinic: [facility]        My Control Medicine: Fluticasone propionate + salmeterol (Advair Diskus or Wixela Inhub) -  250/50 mcg 1 puff twice daily AND  Montelukast (Singulair) -  10 mg by mouth once daily  My Rescue Medicine: Albuterol nebulizer solution every 4 hrs as needed  Albuterol (Proair/Ventolin/Proventil) inhaler 2-4 puffs every 4 hrs as needed   My Asthma Severity: moderate persistent  Avoid your asthma triggers: upper respiratory infections               GREEN ZONE   Good Control    I feel good    No cough or wheeze    Can work, sleep and play without asthma symptoms       Take your asthma control medicine every day.     1. If exercise triggers your asthma, take your rescue medication    15 minutes before exercise or sports, and    During exercise if you have asthma symptoms  2. Spacer to use with inhaler: If you have a spacer, make sure to use it with your inhaler             YELLOW ZONE Getting Worse  I have ANY of these:    I do not feel good    Cough or wheeze    Chest feels tight    Wake up at night   1. Keep taking your Green Zone medications, and Start QVAR 80 mcg 2 puffs three times daily.   2. Start taking your rescue medicine:    every 20 minutes for up to 1 hour. Then every 4 hours for 24-48 hours.  3. If you stay in the Yellow Zone for more than 48  hours, contact your doctor.             RED ZONE Medical Alert - Get Help  I have ANY of these:    I feel awful    Medicine is not helping    Breathing getting harder    Trouble walking or talking    Nose opens wide to breathe       1. Take your rescue medicine NOW  2. If your provider has prescribed an oral steroid medicine, start taking it NOW  3. Call your doctor NOW  4. If you are still in the Red Zone after 20 minutes and you have not reached your doctor:    Take your rescue medicine again and    Call 911 or  go to the emergency room right away    See your regular doctor within 2 weeks of an Emergency Room or Urgent Care visit for follow-up treatment.        Electronically signed by: Jose Angel Leiva, 10/14/2021                Annual Reminders:  Meet with Asthma Educator,  Flu Shot in the Fall, consider Pneumonia Vaccination for patients with asthma (aged 19 and older).    Pharmacy:    [id number] - [facility] [address] AT [facility]  [facility] - [place] [address] AT PORTAL TO [address] [facility] [address]  [id number] - [place], [place] - [address]                    Asthma Triggers  How To Control Things That Make Your Asthma Worse    Triggers are things that make your asthma worse.  Look at the list below to help you find your triggers and what you can do about them.  You can help prevent asthma flare-ups by staying away from your triggers.      Trigger                                                          What you can do   Cigarette Smoke  Tobacco smoke can make asthma worse. Do not allow smoking in your home, car or around you.  Be sure no one smokes at a child s day care or school.  If you smoke, ask your health care provider for ways to help you quit.  Ask family members to quit too.  Ask your health care provider for a referral to Quit Plan to help you quit smoking, or call 9-454-850-PLAN.     Colds, Flu, Bronchitis  These are common triggers of asthma. Wash your hands often.  Don t touch your eyes, nose or mouth.  Get a flu shot every year.     Dust Mites  These are tiny bugs that live in cloth or carpet. They are too small to see. Wash sheets and blankets in hot water every week.   Encase pillows and mattress in dust mite proof covers.  Avoid having carpet if you can. If you have carpet, vacuum weekly.   Use a dust mask and HEPA vacuum.   Pollen and Outdoor Mold  Some people are allergic to trees, grass, or weed  pollen, or molds. Try to keep your windows closed.  Limit time out doors when pollen count is high.   Ask you health care provider about taking medicine during allergy season.     Animal Dander  Some people are allergic to skin flakes, urine or saliva from pets with fur or feathers. Keep pets with fur or feathers out of your home.    If you can t keep the pet outdoors, then keep the pet out of your bedroom.  Keep the bedroom door closed.  Keep pets off cloth furniture and away from stuffed toys.     Mice, Rats, and Cockroaches  Some people are allergic to the waste from these pests.   Cover food and garbage.  Clean up spills and food crumbs.  Store grease in the refrigerator.   Keep food out of the bedroom.   Indoor Mold  This can be a trigger if your home has high moisture. Fix leaking faucets, pipes, or other sources of water.   Clean moldy surfaces.  Dehumidify basement if it is damp and smelly.   Smoke, Strong Odors, and Sprays  These can reduce air quality. Stay away from strong odors and sprays, such as perfume, powder, hair spray, paints, smoke incense, paint, cleaning products, candles and new carpet.   Exercise or Sports  Some people with asthma have this trigger. Be active!  Ask your doctor about taking medicine before sports or exercise to prevent symptoms.    Warm up for 5-10 minutes before and after sports or exercise.     Other Triggers of Asthma  Cold air:  Cover your nose and mouth with a scarf.  Sometimes laughing or crying can be a trigger.  Some medicines and food can trigger asthma.

## 2021-10-14 NOTE — PROGRESS NOTES
SUBJECTIVE:                                                                   Markell Ramsey presents today to our Allergy Clinic at [facility]; He is being seen for a follow up visit. He is a 61 year old male with moderate persistent asthma, allergic rhinitis, recurrent sinus infection/bronchitis and decreased IgM.     November 2017:Normal CH 50. Normal total IgE.  Normal total IgG.  Normal IgG subclasses.   Normal total IgA level. Decreased IgM level on multiple occasions. Normal T and B cells (flow cytometry).  20/22 protective pneumococcal antibody levels. Protective tetanus antibody level.      SPT in 2007 was positive to molds, tree pollen, grass mix, and ragweed. IDs were positive for cat, roaches, dog, mite. Aspergillus mold, and marsh elder. Started by Dr. Whittington on allergen IT in 2007,  [facility] Whittington used to practice before. He received allergen immunotherapy on and off until 2010, which he found partially helpful.   Sinus CT in 2013, with mild-moderate sinus disease, and DNS on the left. The patient confirms nasal congestion L>R.  IMPRESSION:  Mild/moderate mucosal thickening in the paranasal sinuses as described above.  Slight worsening of the right maxillary sinus and worsening of the right frontal sinus since the previous exam, but clearing of the left sphenoid sinus.     Breo was too expensive, so we switched to Advair. He has been  7/7 days compliant with his controller medications (Advair 250/50 mcg 1 puff twice daily and montelukast 10 mg by mouth once daily). QVAR is the Yellow Zone medicine. He hardly ever uses it. He hardly ever needs to use albuterol. Used it 4 times for the past 6 months.   He is waking up less than twice per month due to chest symptoms.  No ED/PCP/urgent care/other specialist visits for asthma flare since the last visit. The patient denies current chest tightness, cough, wheeze, or shortness of breath.   He takes  azithromycin 250 mg by mouth once daily three times a week, and he is doing well. No bronchitis since the last visit. No acute sinus symptoms that required treatment with an antibiotic. He uses budesonide rinses twice daily. He rarely uses azelastine.   Developed a rash on his chest about 1 month ago No new meds. His wife developed some rash as well.       Patient Active Problem List   Diagnosis     Gastroesophageal reflux disease without esophagitis     Non morbid obesity due to excess calories     IgM deficiency (H)     Moderate persistent asthma, uncomplicated     Essential hypertension     Hyperlipidemia LDL goal <130     DNS (deviated nasal septum)     Other chronic rhinitis     Pre-diabetes       Past Medical History:   Diagnosis Date     Acute bronchitis      Allergic rhinitis, cause unspecified 9/23/2008     Allergic rhinitis, unspecified allergic rhinitis type 9/23/2008     Problem list name updated by automated process. Provider to review     Esophageal reflux      Pneumonia 2/24/2013    MRSA, required hospitalization     Unspecified asthma(493.90)       Problem (# of Occurrences) Relation (Name,Age of Onset)    Allergies (1) Mother: asthma    Cancer (1) Father: lung    Respiratory (3) Sister: asthma, Son (Wisam): Upper Resp infections, Son (David): asthma        Past Surgical History:   Procedure Laterality Date     C APPENDECTOMY  08/2006     SURGICAL HISTORY OF -   2/23/06    L4-5 microdiscectomy     TONSILLECTOMY & ADENOIDECTOMY       Social History     Socioeconomic History     Marital status:      Spouse name: None     Number of children: None     Years of education: None     Highest education level: None   Occupational History     Employer: [employer]   Tobacco Use     Smoking status: Never Smoker     Smokeless tobacco: Never Used   Substance and Sexual Activity     Alcohol use: Yes     Comment: rare     Drug use: No     Sexual activity: Yes     Partners: Female   Other Topics Concern      Parent/sibling w/ CABG, MI or angioplasty before 65F 55M? Not Asked   Social History Narrative    October 14, 2021    ENVIRONMENTAL HISTORY: [place] The home is heated with a forced air and gas furnace. They does have central air conditioning. The patient's bedroom is furnished with hard naseem in bedroom, allergen mattress cover and allergen pillowcase cover.  Has 1 rabbit  inside the house since Summer 2020. There is no history of cockroach or mice infestation. There are no smokers in the house.  The house does not have a damp basement.      Social Determinants of Health     Financial Resource Strain:      Difficulty of Paying Living Expenses:    Food Insecurity:      Worried About Running Out of Food in the Last Year:      Ran Out of Food in the Last Year:    Transportation Needs:      Lack of Transportation (Medical):      Lack of Transportation (Non-Medical):    Physical Activity:      Days of Exercise per Week:      Minutes of Exercise per Session:    Stress:      Feeling of Stress :    Social Connections:      Frequency of Communication with Friends and Family:      Frequency of Social Gatherings with Friends and Family:      Attends Spiritism Services:      Active Member of Clubs or Organizations:      Attends Club or Organization Meetings:      Marital Status:    Intimate Partner Violence:      Fear of Current or Ex-Partner:      Emotionally Abused:      Physically Abused:      Sexually Abused:            Review of Systems   HENT: Negative for congestion, ear pain, facial swelling, nosebleeds, postnasal drip, rhinorrhea, sinus pressure and sneezing.    Eyes: Negative for discharge, redness and itching.   Respiratory: Negative for cough, chest tightness, shortness of breath and wheezing.    Cardiovascular: Negative for chest pain.   Gastrointestinal: Negative for diarrhea, nausea and vomiting.   Skin: Negative for rash.   Neurological: Negative for headaches.    Hematological: Negative for adenopathy.           Current Outpatient Medications:      albuterol (VENTOLIN HFA) 108 (90 Base) MCG/ACT inhaler, Inhale 2-4 puffs into the lungs every 4 hours as needed for shortness of breath / dyspnea Take before exercise., Disp: 18 g, Rfl: 3     atorvastatin (LIPITOR) 10 MG tablet, Take 1 tablet (10 mg) by mouth daily, Disp: 90 tablet, Rfl: 3     azelastine (ASTELIN) 0.1 % nasal spray, Spray 1 spray into both nostrils 2 times daily, Disp: 90 mL, Rfl: 2     azithromycin (ZITHROMAX) 250 MG tablet, Take one tablet by mouth once daily on Mondays, Wednesdays and Fridays., Disp: 36 tablet, Rfl: 3     beclomethasone HFA (QVAR REDIHALER) 80 MCG/ACT inhaler, 2 puffs 3 times a day, in the yellow and red zones only of asthma action plan, Disp: 10.6 g, Rfl: 3     budesonide (PULMICORT) 0.25 MG/2ML neb solution, Mix respule of 0.25mg/2 mL budesonide vial in 8oz normal saline sinus rinse bottle. Irrigate each nostril with half of the bottle twice daily for 2 months, and then once daily., Disp: 120 mL, Rfl: 3     ESOMEPRAZOLE MAGNESIUM PO, Take 20 mg by mouth every morning (before breakfast), Disp: , Rfl:      fluticasone-salmeterol (ADVAIR) 250-50 MCG/DOSE inhaler, Inhale 1 puff into the lungs every 12 hours, Disp: 3 each, Rfl: 3     lisinopril-hydrochlorothiazide (ZESTORETIC) 10-12.5 MG tablet, Take 1 tablet by mouth daily, Disp: 90 tablet, Rfl: 3     montelukast (SINGULAIR) 10 MG tablet, Take 1 tablet (10 mg) by mouth At Bedtime, Disp: 90 tablet, Rfl: 3     triamcinolone (KENALOG) 0.1 % external ointment, Apply sparingly to affected area twice daily as needed not longer than 14 days in a row. Do not apply on face, neck, armpits and groin area., Disp: 80 g, Rfl: 1     ipratropium - albuterol 0.5 mg/2.5 mg/3 mL (DUONEB) 0.5-2.5 (3) MG/3ML neb solution, Take 1 vial (3 mLs) by nebulization every 6 hours as needed for shortness of breath / dyspnea or wheezing (Patient not taking: Reported on  10/16/2018), Disp: 30 vial, Rfl: 1     Respiratory Therapy Supplies (NEBULIZER/TUBING/MOUTHPIECE) KIT, use with nebulizer (Patient not taking: Reported on 10/16/2018), Disp: 1 kit, Rfl: 11  Immunization History   Administered Date(s) Administered     COVID-19,PF,Little 03/05/2021     Influenza (H1N1) 01/11/2010     Influenza (IIV3) PF 11/02/1998, 12/19/2006, 10/29/2008, 08/26/2009, 10/05/2010, 01/04/2013     Influenza Quad, Recombinant, pf(RIV4) (Flublok) 10/21/2019, 11/18/2020, 10/14/2021     Influenza Vaccine IM > 6 months Valent IIV4 (Alfuria,Fluzone) 10/23/2015, 09/21/2017, 09/06/2018     Influenza Vaccine IM Ages 6-35 Months 4 Valent (PF) 01/30/2014     Pneumococcal 23 valent 01/29/2016     TD (ADULT, 7+) 12/02/2004     TDAP Vaccine (Adacel) 11/18/2008     Td (Adult), Adsorbed 09/29/1997     Varicella 10/15/2002, 11/11/2002     No Known Allergies  OBJECTIVE:                                                                 /86 (BP Location: Left arm, Patient Position: Sitting, Cuff Size: Adult Regular)   Pulse 84   Temp 98.1  F (36.7  C) (Tympanic)   Wt 103.3 kg (227 lb 11.8 oz)   SpO2 95%   BMI 33.57 kg/m        Physical Exam  Vitals and nursing note reviewed.   Constitutional:       General: He is not in acute distress.     Appearance: He is obese. He is not diaphoretic.   HENT:      Head: Normocephalic and atraumatic.      Right Ear: Tympanic membrane, ear canal and external ear normal.      Left Ear: Tympanic membrane, ear canal and external ear normal.      Nose: Septal deviation (leftward with septal spur) present. No mucosal edema or rhinorrhea.      Right Turbinates: Not enlarged, swollen or pale.      Left Turbinates: Not enlarged, swollen or pale.      Mouth/Throat:      Lips: Pink.      Mouth: Mucous membranes are moist.      Pharynx: Oropharynx is clear. No pharyngeal swelling, oropharyngeal exudate or posterior oropharyngeal erythema.   Eyes:      General:         Right eye: No discharge.          Left eye: No discharge.      Conjunctiva/sclera: Conjunctivae normal.   Pulmonary:      Effort: Pulmonary effort is normal. No respiratory distress.      Breath sounds: Normal breath sounds and air entry. No stridor, decreased air movement or transmitted upper airway sounds. No decreased breath sounds, wheezing, rhonchi or rales.   Musculoskeletal:         General: Normal range of motion.   Lymphadenopathy:      Cervical: No cervical adenopathy.   Skin:     General: Skin is warm.      Capillary Refill: Capillary refill takes less than 2 seconds.      Comments: Macular papular rash in mid and lower chest anteriorly   Neurological:      Mental Status: He is alert and oriented to person, place, and time.   Psychiatric:         Mood and Affect: Mood normal.         Behavior: Behavior normal.           WORKUP:   ACT Score:  23    ASSESSMENT/PLAN:    Moderate persistent asthma, uncomplicated  Symptoms are currently well controlled with Advair 250/50 mcg 1 puff twice daily, montelukast 10 mg by mouth once daily, Qvar in the yellow zone, and albuterol inhaler as needed basis.  -Continue as is.    - albuterol (VENTOLIN HFA) 108 (90 Base) MCG/ACT inhaler  Dispense: 18 g; Refill: 3  - beclomethasone HFA (QVAR REDIHALER) 80 MCG/ACT inhaler  Dispense: 10.6 g; Refill: 3    Chronic rhinitis  IgM deficiency (H)  Other chronic sinusitis/recurrent bronchitis  IgM deficiency causing recurrent sinusitis and recurrent bronchitis well-controlled with azithromycin on Mondays, Wednesdays, and Fridays.  Additional nasal symptom control with a combination of Pulmicort 0.5 mg / 2 mL rinses twice daily.  He rarely needs to use azelastine.  -He will continue taking azithromycin 3 times a week all year-round.  Once things with Covid get better, we may transition azithromycin to cold seasons only.  -Continue azelastine on as-needed basis.  -Switch budesonide rinses to 0.25 mg / 2 mL twice daily.  If he is doing well for 2 months, decrease  the frequency of rinses to once daily.    - budesonide (PULMICORT) 0.25 MG/2ML neb solution  Dispense: 120 mL; Refill: 3  - azelastine (ASTELIN) 0.1 % nasal spray  Dispense: 90 mL; Refill: 2         Dermatitis  Use triamcinolone ointment: Apply sparingly to affected area two times daily as needed but not more than 14 days in a row. Spare face, armpits, neck, and groin.  Recommend to see dermatology if it does not get better.      - triamcinolone (KENALOG) 0.1 % external ointment  Dispense: 80 g; Refill: 1    Need for prophylactic vaccination and inoculation against influenza    - INFLUENZA QUAD, RECOMBINANT, P-FREE (RIV4) (FLUBLOK)       Return in about 1 year (around 10/14/2022), or if symptoms worsen or fail to improve.    Thank you for allowing us to participate in the care of this patient. Please feel free to contact us if there are any questions or concerns about the patient.    Disclaimer: This note consists of symbols derived from keyboarding, dictation and/or voice recognition software. As a result, there may be errors in the script that have gone undetected. Please consider this when interpreting information found in this chart.    Jose Angel Leiva MD, FAAAAI, FACAAI  Allergy, Asthma and Immunology     [facility]

## 2021-10-14 NOTE — PATIENT INSTRUCTIONS
Continue asthma management as is : ADVAIR 250/50 1 PUFF TWICE DAILY, MONTELUKAST 10 MG BY MOUTH every day, ALBUTEROL AS NEEDED , AND QVAR IN THE YELLOW ZONE.     Continue azithromycin as is. Once covid situation is better, may stop it in warm seasons.     Switch budesonide rinses to a lower concentration. Use twice daily. If no issues for 2 months, transition to once daily.     Use tetracycline ointment: Apply sparingly to affected area two times daily as needed but not more than 14 days in a row. Spare face, armpits, neck, and groin.    Allergy Staff Appt Hours Shot Hours Locations    Physician     Jose Angel Leiva MD       Support Staff     Delphine Talbot LPN     Karthik CMA    Tuesday:   [place] 7-5 Wednesday:  [place]: 7-5 Thursday:         [facility] Friday:  [place] 7-3  [place]        Thursday: 7-5:20        Friday: 7-12:20     [place]        Tuesday: 7- 4:20 Wednesday: 7-4:20 [place] Monday: 7-2:20 Tuesday: 9-5:20 Thursday: 7-2:20    [place]       Tues & Wed: 7-5:20 Mon & Thurs: 7-4:20       Fri: 7-2:20           [facility]  [address] [place] [address]  Appt Line: [phone]      [facility]  [address] [place] [address]  Appt Line: [phone]       Important Scheduling Information (if recommended by provider):  Aspirin Desensitization: Appt will last 2 clinic days. Please call the Allergy RN line for your clinic to schedule. Discontinue antihistamines 7 days prior to the appointment.     Food Challenges: Appt will last 3-4 hours. Please call the Allergy RN line for your clinic to schedule. Discontinue antihistamines 7 days prior to the appointment.     Penicillin Testing: Appt will last 2-3 hours. Please call the Allergy RN line for your clinic to schedule. Discontinue antihistamines 7 days prior to the appointment.     Skin Testing: Appt will about 40 minutes. Call the appointment line for your clinic to  schedule. Discontinue antihistamines 7 days prior to the appointment.     Thank you for trusting us with your Allergy, Asthma, and Immunology care. Please feel free to contact us with any questions or concerns you may have.

## 2021-10-14 NOTE — LETTER
10/14/2021         RE: Markell Ramsey  [address] [address]        Dear Colleague,    Thank you for referring your patient, Mrakell Ramsey, to the [facility]. Please see a copy of my visit note below.    SUBJECTIVE:                                                                   Markell Ramsey presents today to our Allergy Clinic at [facility]; He is being seen for a follow up visit. He is a 61 year old male with moderate persistent asthma, allergic rhinitis, recurrent sinus infection/bronchitis and decreased IgM.     November 2017:Normal CH 50. Normal total IgE.  Normal total IgG.  Normal IgG subclasses.   Normal total IgA level. Decreased IgM level on multiple occasions. Normal T and B cells (flow cytometry).  20/22 protective pneumococcal antibody levels. Protective tetanus antibody level.      SPT in 2007 was positive to molds, tree pollen, grass mix, and ragweed. IDs were positive for cat, roaches, dog, mite. Aspergillus mold, and marsh elder. Started by Dr. Whittington on allergen IT in 2007,  [facility] Whittington used to practice before. He received allergen immunotherapy on and off until 2010, which he found partially helpful.   Sinus CT in 2013, with mild-moderate sinus disease, and DNS on the left. The patient confirms nasal congestion L>R.  IMPRESSION:  Mild/moderate mucosal thickening in the paranasal sinuses as described above.  Slight worsening of the right maxillary sinus and worsening of the right frontal sinus since the previous exam, but clearing of the left sphenoid sinus.     Breo was too expensive, so we switched to Advair. He has been  7/7 days compliant with his controller medications (Advair 250/50 mcg 1 puff twice daily and montelukast 10 mg by mouth once daily). QVAR is the Yellow Zone medicine. He hardly ever uses it. He hardly ever needs to use albuterol. Used it 4 times for the past 6 months.   He  is waking up less than twice per month due to chest symptoms.  No ED/PCP/urgent care/other specialist visits for asthma flare since the last visit. The patient denies current chest tightness, cough, wheeze, or shortness of breath.   He takes azithromycin 250 mg by mouth once daily three times a week, and he is doing well. No bronchitis since the last visit. No acute sinus symptoms that required treatment with an antibiotic. He uses budesonide rinses twice daily. He rarely uses azelastine.   Developed a rash on his chest about 1 month ago No new meds. His wife developed some rash as well.       Patient Active Problem List   Diagnosis     Gastroesophageal reflux disease without esophagitis     Non morbid obesity due to excess calories     IgM deficiency (H)     Moderate persistent asthma, uncomplicated     Essential hypertension     Hyperlipidemia LDL goal <130     DNS (deviated nasal septum)     Other chronic rhinitis     Pre-diabetes       Past Medical History:   Diagnosis Date     Acute bronchitis      Allergic rhinitis, cause unspecified 9/23/2008     Allergic rhinitis, unspecified allergic rhinitis type 9/23/2008     Problem list name updated by automated process. Provider to review     Esophageal reflux      Pneumonia 2/24/2013    MRSA, required hospitalization     Unspecified asthma(493.90)       Problem (# of Occurrences) Relation (Name,Age of Onset)    Allergies (1) Mother: asthma    Cancer (1) Father: lung    Respiratory (3) Sister: asthma, Son (Ludante): Upper Resp infections, Son (David): asthma        Past Surgical History:   Procedure Laterality Date     C APPENDECTOMY  08/2006     SURGICAL HISTORY OF -   2/23/06    L4-5 microdiscectomy     TONSILLECTOMY & ADENOIDECTOMY       Social History     Socioeconomic History     Marital status:      Spouse name: None     Number of children: None     Years of education: None     Highest education level: None   Occupational History     Employer: [employer]    Tobacco Use     Smoking status: Never Smoker     Smokeless tobacco: Never Used   Substance and Sexual Activity     Alcohol use: Yes     Comment: rare     Drug use: No     Sexual activity: Yes     Partners: Female   Other Topics Concern     Parent/sibling w/ CABG, MI or angioplasty before 65F 55M? Not Asked   Social History Narrative    October 14, 2021    ENVIRONMENTAL HISTORY: [place] The home is heated with a forced air and gas furnace. They does have central air conditioning. The patient's bedroom is furnished with hard naseem in bedroom, allergen mattress cover and allergen pillowcase cover.  Has 1 rabbit  inside the house since Summer 2020. There is no history of cockroach or mice infestation. There are no smokers in the house.  The house does not have a damp basement.      Social Determinants of Health     Financial Resource Strain:      Difficulty of Paying Living Expenses:    Food Insecurity:      Worried About Running Out of Food in the Last Year:      Ran Out of Food in the Last Year:    Transportation Needs:      Lack of Transportation (Medical):      Lack of Transportation (Non-Medical):    Physical Activity:      Days of Exercise per Week:      Minutes of Exercise per Session:    Stress:      Feeling of Stress :    Social Connections:      Frequency of Communication with Friends and Family:      Frequency of Social Gatherings with Friends and Family:      Attends Gnosticism Services:      Active Member of Clubs or Organizations:      Attends Club or Organization Meetings:      Marital Status:    Intimate Partner Violence:      Fear of Current or Ex-Partner:      Emotionally Abused:      Physically Abused:      Sexually Abused:            Review of Systems   HENT: Negative for congestion, ear pain, facial swelling, nosebleeds, postnasal drip, rhinorrhea, sinus pressure and sneezing.    Eyes: Negative for discharge, redness and itching.   Respiratory:  Negative for cough, chest tightness, shortness of breath and wheezing.    Cardiovascular: Negative for chest pain.   Gastrointestinal: Negative for diarrhea, nausea and vomiting.   Skin: Negative for rash.   Neurological: Negative for headaches.   Hematological: Negative for adenopathy.           Current Outpatient Medications:      albuterol (VENTOLIN HFA) 108 (90 Base) MCG/ACT inhaler, Inhale 2-4 puffs into the lungs every 4 hours as needed for shortness of breath / dyspnea Take before exercise., Disp: 18 g, Rfl: 3     atorvastatin (LIPITOR) 10 MG tablet, Take 1 tablet (10 mg) by mouth daily, Disp: 90 tablet, Rfl: 3     azelastine (ASTELIN) 0.1 % nasal spray, Spray 1 spray into both nostrils 2 times daily, Disp: 90 mL, Rfl: 2     azithromycin (ZITHROMAX) 250 MG tablet, Take one tablet by mouth once daily on Mondays, Wednesdays and Fridays., Disp: 36 tablet, Rfl: 3     beclomethasone HFA (QVAR REDIHALER) 80 MCG/ACT inhaler, 2 puffs 3 times a day, in the yellow and red zones only of asthma action plan, Disp: 10.6 g, Rfl: 3     budesonide (PULMICORT) 0.25 MG/2ML neb solution, Mix respule of 0.25mg/2 mL budesonide vial in 8oz normal saline sinus rinse bottle. Irrigate each nostril with half of the bottle twice daily for 2 months, and then once daily., Disp: 120 mL, Rfl: 3     ESOMEPRAZOLE MAGNESIUM PO, Take 20 mg by mouth every morning (before breakfast), Disp: , Rfl:      fluticasone-salmeterol (ADVAIR) 250-50 MCG/DOSE inhaler, Inhale 1 puff into the lungs every 12 hours, Disp: 3 each, Rfl: 3     lisinopril-hydrochlorothiazide (ZESTORETIC) 10-12.5 MG tablet, Take 1 tablet by mouth daily, Disp: 90 tablet, Rfl: 3     montelukast (SINGULAIR) 10 MG tablet, Take 1 tablet (10 mg) by mouth At Bedtime, Disp: 90 tablet, Rfl: 3     triamcinolone (KENALOG) 0.1 % external ointment, Apply sparingly to affected area twice daily as needed not longer than 14 days in a row. Do not apply on face, neck, armpits and groin area., Disp:  80 g, Rfl: 1     ipratropium - albuterol 0.5 mg/2.5 mg/3 mL (DUONEB) 0.5-2.5 (3) MG/3ML neb solution, Take 1 vial (3 mLs) by nebulization every 6 hours as needed for shortness of breath / dyspnea or wheezing (Patient not taking: Reported on 10/16/2018), Disp: 30 vial, Rfl: 1     Respiratory Therapy Supplies (NEBULIZER/TUBING/MOUTHPIECE) KIT, use with nebulizer (Patient not taking: Reported on 10/16/2018), Disp: 1 kit, Rfl: 11  Immunization History   Administered Date(s) Administered     COVID-19,PF,Little 03/05/2021     Influenza (H1N1) 01/11/2010     Influenza (IIV3) PF 11/02/1998, 12/19/2006, 10/29/2008, 08/26/2009, 10/05/2010, 01/04/2013     Influenza Quad, Recombinant, pf(RIV4) (Flublok) 10/21/2019, 11/18/2020, 10/14/2021     Influenza Vaccine IM > 6 months Valent IIV4 (Alfuria,Fluzone) 10/23/2015, 09/21/2017, 09/06/2018     Influenza Vaccine IM Ages 6-35 Months 4 Valent (PF) 01/30/2014     Pneumococcal 23 valent 01/29/2016     TD (ADULT, 7+) 12/02/2004     TDAP Vaccine (Adacel) 11/18/2008     Td (Adult), Adsorbed 09/29/1997     Varicella 10/15/2002, 11/11/2002     No Known Allergies  OBJECTIVE:                                                                 /86 (BP Location: Left arm, Patient Position: Sitting, Cuff Size: Adult Regular)   Pulse 84   Temp 98.1  F (36.7  C) (Tympanic)   Wt 103.3 kg (227 lb 11.8 oz)   SpO2 95%   BMI 33.57 kg/m        Physical Exam  Vitals and nursing note reviewed.   Constitutional:       General: He is not in acute distress.     Appearance: He is obese. He is not diaphoretic.   HENT:      Head: Normocephalic and atraumatic.      Right Ear: Tympanic membrane, ear canal and external ear normal.      Left Ear: Tympanic membrane, ear canal and external ear normal.      Nose: Septal deviation (leftward with septal spur) present. No mucosal edema or rhinorrhea.      Right Turbinates: Not enlarged, swollen or pale.      Left Turbinates: Not enlarged, swollen or pale.       Mouth/Throat:      Lips: Pink.      Mouth: Mucous membranes are moist.      Pharynx: Oropharynx is clear. No pharyngeal swelling, oropharyngeal exudate or posterior oropharyngeal erythema.   Eyes:      General:         Right eye: No discharge.         Left eye: No discharge.      Conjunctiva/sclera: Conjunctivae normal.   Pulmonary:      Effort: Pulmonary effort is normal. No respiratory distress.      Breath sounds: Normal breath sounds and air entry. No stridor, decreased air movement or transmitted upper airway sounds. No decreased breath sounds, wheezing, rhonchi or rales.   Musculoskeletal:         General: Normal range of motion.   Lymphadenopathy:      Cervical: No cervical adenopathy.   Skin:     General: Skin is warm.      Capillary Refill: Capillary refill takes less than 2 seconds.      Comments: Macular papular rash in mid and lower chest anteriorly   Neurological:      Mental Status: He is alert and oriented to person, place, and time.   Psychiatric:         Mood and Affect: Mood normal.         Behavior: Behavior normal.           WORKUP:   ACT Score:  23    ASSESSMENT/PLAN:    Moderate persistent asthma, uncomplicated  Symptoms are currently well controlled with Advair 250/50 mcg 1 puff twice daily, montelukast 10 mg by mouth once daily, Qvar in the yellow zone, and albuterol inhaler as needed basis.  -Continue as is.    - albuterol (VENTOLIN HFA) 108 (90 Base) MCG/ACT inhaler  Dispense: 18 g; Refill: 3  - beclomethasone HFA (QVAR REDIHALER) 80 MCG/ACT inhaler  Dispense: 10.6 g; Refill: 3    Chronic rhinitis  IgM deficiency (H)  Other chronic sinusitis/recurrent bronchitis  IgM deficiency causing recurrent sinusitis and recurrent bronchitis well-controlled with azithromycin on Mondays, Wednesdays, and Fridays.  Additional nasal symptom control with a combination of Pulmicort 0.5 mg / 2 mL rinses twice daily.  He rarely needs to use azelastine.  -He will continue taking azithromycin 3 times a week all  year-round.  Once things with Covid get better, we may transition azithromycin to cold seasons only.  -Continue azelastine on as-needed basis.  -Switch budesonide rinses to 0.25 mg / 2 mL twice daily.  If he is doing well for 2 months, decrease the frequency of rinses to once daily.    - budesonide (PULMICORT) 0.25 MG/2ML neb solution  Dispense: 120 mL; Refill: 3  - azelastine (ASTELIN) 0.1 % nasal spray  Dispense: 90 mL; Refill: 2         Dermatitis  Use triamcinolone ointment: Apply sparingly to affected area two times daily as needed but not more than 14 days in a row. Spare face, armpits, neck, and groin.  Recommend to see dermatology if it does not get better.      - triamcinolone (KENALOG) 0.1 % external ointment  Dispense: 80 g; Refill: 1    Need for prophylactic vaccination and inoculation against influenza    - INFLUENZA QUAD, RECOMBINANT, P-FREE (RIV4) (FLUBLOK)       Return in about 1 year (around 10/14/2022), or if symptoms worsen or fail to improve.    Thank you for allowing us to participate in the care of this patient. Please feel free to contact us if there are any questions or concerns about the patient.    Disclaimer: This note consists of symbols derived from keyboarding, dictation and/or voice recognition software. As a result, there may be errors in the script that have gone undetected. Please consider this when interpreting information found in this chart.    Jose Angel Leiva MD, FAAAAI, FACAAI  Allergy, Asthma and Immunology     [facility]         Again, thank you for allowing me to participate in the care of your patient.        Sincerely,        Jose Angel Leiva MD

## 2021-10-15 ASSESSMENT — ASTHMA QUESTIONNAIRES: ACT_TOTALSCORE: 23

## 2021-11-05 DIAGNOSIS — J31.0 CHRONIC RHINITIS: ICD-10-CM

## 2021-11-05 DIAGNOSIS — J32.8 OTHER CHRONIC SINUSITIS: ICD-10-CM

## 2021-11-05 RX ORDER — BUDESONIDE 0.25 MG/2ML
INHALANT ORAL
Qty: 120 ML | Refills: 3 | OUTPATIENT
Start: 2021-11-05

## 2021-11-05 NOTE — TELEPHONE ENCOUNTER
Too soon for refill. Last seen with new Rx sent to pharmacy on 10/14/21 with 3 additional refills.     Delphine LU RN  Specialty/Allergy Clinics

## 2021-11-05 NOTE — TELEPHONE ENCOUNTER
Requested Prescriptions   Pending Prescriptions Disp Refills     budesonide (PULMICORT) 0.25 MG/2ML neb solution 120 mL 3     Sig: Mix respule of 0.25mg/2 mL budesonide vial in 8oz normal saline sinus rinse bottle. Irrigate each nostril with half of the bottle twice daily for 2 months, and then once daily.       There is no refill protocol information for this order        Last office visit: 10/14/2021 with prescribing provider:  Dr. Leiva    Future Office Visit:          Mikki Cary  Specialty Clinic CSS

## 2021-11-08 ENCOUNTER — MYC MEDICAL ADVICE (OUTPATIENT)
Dept: FAMILY MEDICINE | Facility: CLINIC | Age: 61
End: 2021-11-08
Payer: COMMERCIAL

## 2021-11-16 DIAGNOSIS — E78.5 HYPERLIPIDEMIA LDL GOAL <130: ICD-10-CM

## 2021-11-16 DIAGNOSIS — I10 ESSENTIAL HYPERTENSION: ICD-10-CM

## 2021-11-16 NOTE — LETTER
[facility]  [address] [address]  Phone: [phone]       November 19, 2021         Markell Ramsey  [address] [address]            Dear Markell:    We are concerned about your health care.  We recently provided you with medication refills.  Many medications require routine and labs follow-up with your doctor.    Your prescription(s) have been refilled for 90 days so you may have time for the above noted follow-up. Please call to schedule soon so we can assure you have an appointment before your next refills are needed.    Thank you,      Mary Lezama DO / Libia BAEZA RN

## 2021-11-18 NOTE — TELEPHONE ENCOUNTER
Pending Prescriptions:                       Disp   Refills    lisinopril-hydrochlorothiazide (ZESTORETI*90 tab*3            Sig: TAKE 1 TABLET BY MOUTH EVERY DAY    atorvastatin (LIPITOR) 10 MG tablet [Phar*90 tab*3            Sig: TAKE 1 TABLET BY MOUTH EVERY DAY    Routing refill request to provider for review/approval because:  Labs not current:    Creatinine   Date Value Ref Range Status   10/31/2019 1.09 0.66 - 1.25 mg/dL Final     Potassium   Date Value Ref Range Status   10/31/2019 3.5 3.4 - 5.3 mmol/L Final     LDL Cholesterol Calculated   Date Value Ref Range Status   10/31/2019 99 <100 mg/dL Final     Comment:     Desirable:       <100 mg/dl     Duane Jones RN

## 2021-11-19 RX ORDER — LISINOPRIL/HYDROCHLOROTHIAZIDE 10-12.5 MG
TABLET ORAL
Qty: 90 TABLET | Refills: 0 | Status: SHIPPED | OUTPATIENT
Start: 2021-11-19 | End: 2022-04-06

## 2021-11-19 RX ORDER — ATORVASTATIN CALCIUM 10 MG/1
TABLET, FILM COATED ORAL
Qty: 90 TABLET | Refills: 0 | Status: SHIPPED | OUTPATIENT
Start: 2021-11-19 | End: 2022-04-18

## 2021-11-19 NOTE — TELEPHONE ENCOUNTER
Refilled for 90 days, patient is due for office visit and labs    Chelle Gruber, ESTHER CNP, covering for PCP

## 2021-12-24 DIAGNOSIS — L30.9 DERMATITIS: ICD-10-CM

## 2021-12-24 RX ORDER — TRIAMCINOLONE ACETONIDE 1 MG/G
OINTMENT TOPICAL
Qty: 80 G | Refills: 1 | Status: SHIPPED | OUTPATIENT
Start: 2021-12-24

## 2021-12-24 NOTE — TELEPHONE ENCOUNTER
Requested Prescriptions   Pending Prescriptions Disp Refills     triamcinolone (KENALOG) 0.1 % external ointment 80 g 1     Sig: Apply sparingly to affected area twice daily as needed not longer than 14 days in a row. Do not apply on face, neck, armpits and groin area.       There is no refill protocol information for this order        Last office visit: 10/14/2021 with prescribing provider:  Dr. Leiva    [facility]  Special[facility] Clinic [facility]

## 2021-12-24 NOTE — TELEPHONE ENCOUNTER
Prescription approved per [facility] Refill Protocol.  Libertad GAYLE RN  Specialty/Allergy Clinic

## 2022-01-15 ENCOUNTER — HEALTH MAINTENANCE LETTER (OUTPATIENT)
Age: 62
End: 2022-01-15

## 2022-02-07 NOTE — PATIENT INSTRUCTIONS
Start QNASL 80 mcg 1-2 puffs once a day, in each nostril. If QNASL isn't covered, would prescribe you flunisolide.   -Use azelastine 2 sprays in each nostril twice a day when necessary.      Asthma management per asthma action plan.    Get the bloodwork done.  Get chest x-ray done.    Augmentin for 21 days.     No Repair - Repaired With Adjacent Surgical Defect Text (Leave Blank If You Do Not Want): After obtaining clear surgical margins the defect was repaired concurrently with another surgical defect which was in close approximation.

## 2022-02-09 DIAGNOSIS — E78.5 HYPERLIPIDEMIA LDL GOAL <130: ICD-10-CM

## 2022-02-09 DIAGNOSIS — I10 ESSENTIAL HYPERTENSION: ICD-10-CM

## 2022-02-10 NOTE — TELEPHONE ENCOUNTER
"Requested Prescriptions   Pending Prescriptions Disp Refills    lisinopril-hydrochlorothiazide (ZESTORETIC) 10-12.5 MG tablet [Pharmacy Med Name: LISINOPRIL-HCTZ 10-12.5 MG TAB] 90 tablet 0     Sig: TAKE 1 TABLET BY MOUTH EVERY DAY        Diuretics (Including Combos) Protocol Failed - 2/9/2022 11:47 PM        Failed - Recent (12 mo) or future (30 days) visit within the authorizing provider's specialty     Patient has had an office visit with the authorizing provider or a provider within the authorizing providers department within the previous 12 mos or has a future within next 30 days. See \"Patient Info\" tab in inbasket, or \"Choose Columns\" in Meds & Orders section of the refill encounter.              Failed - Normal serum creatinine on file in past 12 months       Recent Labs   Lab Test 10/31/19  0626   CR 1.09              Failed - Normal serum potassium on file in past 12 months       Recent Labs   Lab Test 10/31/19  0626   POTASSIUM 3.5                    Failed - Normal serum sodium on file in past 12 months       Recent Labs   Lab Test 10/31/19  0626                 Passed - Blood pressure under 140/90 in past 12 months       BP Readings from Last 3 Encounters:   10/14/21 131/86   11/18/20 122/82   09/18/20 125/85                 Passed - Medication is active on med list        Passed - Patient is age 18 or older       ACE Inhibitors (Including Combos) Protocol Failed - 2/9/2022 11:47 PM        Failed - Recent (12 mo) or future (30 days) visit within the authorizing provider's specialty     Patient has had an office visit with the authorizing provider or a provider within the authorizing providers department within the previous 12 mos or has a future within next 30 days. See \"Patient Info\" tab in inbasket, or \"Choose Columns\" in Meds & Orders section of the refill encounter.              Failed - Normal serum creatinine on file in past 12 months     Recent Labs   Lab Test 10/31/19  0626   CR 1.09 " "      Ok to refill medication if creatinine is low          Failed - Normal serum potassium on file in past 12 months     Recent Labs   Lab Test 10/31/19  0626   POTASSIUM 3.5               Passed - Blood pressure under 140/90 in past 12 months       BP Readings from Last 3 Encounters:   10/14/21 131/86   11/18/20 122/82   09/18/20 125/85                 Passed - Medication is active on med list        Passed - Patient is age 18 or older           atorvastatin (LIPITOR) 10 MG tablet [Pharmacy Med Name: ATORVASTATIN 10 MG TABLET] 90 tablet 0     Sig: TAKE 1 TABLET BY MOUTH EVERY DAY        Statins Protocol Failed - 2/9/2022 11:47 PM        Failed - LDL on file in past 12 months     Recent Labs   Lab Test 10/31/19  0626   LDL 99               Failed - Recent (12 mo) or future (30 days) visit within the authorizing provider's specialty     Patient has had an office visit with the authorizing provider or a provider within the authorizing providers department within the previous 12 mos or has a future within next 30 days. See \"Patient Info\" tab in inbasket, or \"Choose Columns\" in Meds & Orders section of the refill encounter.              Passed - No abnormal creatine kinase in past 12 months     No lab results found.             Passed - Medication is active on med list        Passed - Patient is age 18 or older              "

## 2022-04-06 ENCOUNTER — MYC REFILL (OUTPATIENT)
Dept: FAMILY MEDICINE | Facility: CLINIC | Age: 62
End: 2022-04-06
Payer: COMMERCIAL

## 2022-04-06 DIAGNOSIS — I10 ESSENTIAL HYPERTENSION: ICD-10-CM

## 2022-04-08 ENCOUNTER — TELEPHONE (OUTPATIENT)
Dept: FAMILY MEDICINE | Facility: CLINIC | Age: 62
End: 2022-04-08
Payer: COMMERCIAL

## 2022-04-08 RX ORDER — LISINOPRIL/HYDROCHLOROTHIAZIDE 10-12.5 MG
1 TABLET ORAL DAILY
Qty: 30 TABLET | Refills: 0 | Status: SHIPPED | OUTPATIENT
Start: 2022-04-08 | End: 2022-04-18

## 2022-04-08 NOTE — TELEPHONE ENCOUNTER
Patient calling the clinic today as is out of medication before Monday, needs today as he is going of town today. This refill was located and sent for 30 tablets of the Zestoretic till appointment on 4-18-22.    PAULETTE Nguyen

## 2022-04-08 NOTE — TELEPHONE ENCOUNTER
Pending Prescriptions:                       Disp   Refills    lisinopril-hydrochlorothiazide (ZESTORETIC*90 tab*0        Sig: Take 1 tablet by mouth daily    Routing refill request to provider for review/approval because:  Labs not current.     See patient comment in refill request; has an appointment scheduled with Kristin Zavala CNP on 4/18/22, is requesting enough medication to get through to appointment.    Britney Arndt RN  [facility]

## 2022-04-18 ENCOUNTER — OFFICE VISIT (OUTPATIENT)
Dept: FAMILY MEDICINE | Facility: CLINIC | Age: 62
End: 2022-04-18
Payer: COMMERCIAL

## 2022-04-18 VITALS
SYSTOLIC BLOOD PRESSURE: 136 MMHG | WEIGHT: 233.1 LBS | OXYGEN SATURATION: 98 % | HEIGHT: 69 IN | RESPIRATION RATE: 12 BRPM | DIASTOLIC BLOOD PRESSURE: 84 MMHG | HEART RATE: 77 BPM | TEMPERATURE: 97.8 F | BODY MASS INDEX: 34.52 KG/M2

## 2022-04-18 DIAGNOSIS — E78.5 HYPERLIPIDEMIA LDL GOAL <130: ICD-10-CM

## 2022-04-18 DIAGNOSIS — Z12.11 SCREEN FOR COLON CANCER: ICD-10-CM

## 2022-04-18 DIAGNOSIS — I10 ESSENTIAL HYPERTENSION: Primary | ICD-10-CM

## 2022-04-18 DIAGNOSIS — R73.09 ELEVATED GLUCOSE: Primary | ICD-10-CM

## 2022-04-18 LAB
ANION GAP SERPL CALCULATED.3IONS-SCNC: 3 MMOL/L (ref 3–14)
BUN SERPL-MCNC: 13 MG/DL (ref 7–30)
CALCIUM SERPL-MCNC: 8.6 MG/DL (ref 8.5–10.1)
CHLORIDE BLD-SCNC: 109 MMOL/L (ref 94–109)
CHOLEST SERPL-MCNC: 175 MG/DL
CO2 SERPL-SCNC: 28 MMOL/L (ref 20–32)
CREAT SERPL-MCNC: 0.83 MG/DL (ref 0.66–1.25)
FASTING STATUS PATIENT QL REPORTED: ABNORMAL
GFR SERPL CREATININE-BSD FRML MDRD: >90 ML/MIN/1.73M2
GLUCOSE BLD-MCNC: 174 MG/DL (ref 70–99)
HDLC SERPL-MCNC: 45 MG/DL
LDLC SERPL CALC-MCNC: 94 MG/DL
NONHDLC SERPL-MCNC: 130 MG/DL
POTASSIUM BLD-SCNC: 3.8 MMOL/L (ref 3.4–5.3)
SODIUM SERPL-SCNC: 140 MMOL/L (ref 133–144)
TRIGL SERPL-MCNC: 182 MG/DL

## 2022-04-18 PROCEDURE — 80048 BASIC METABOLIC PNL TOTAL CA: CPT | Performed by: NURSE PRACTITIONER

## 2022-04-18 PROCEDURE — 90471 IMMUNIZATION ADMIN: CPT | Performed by: NURSE PRACTITIONER

## 2022-04-18 PROCEDURE — 90714 TD VACC NO PRESV 7 YRS+ IM: CPT | Performed by: NURSE PRACTITIONER

## 2022-04-18 PROCEDURE — 99214 OFFICE O/P EST MOD 30 MIN: CPT | Mod: 25 | Performed by: NURSE PRACTITIONER

## 2022-04-18 PROCEDURE — 36415 COLL VENOUS BLD VENIPUNCTURE: CPT | Performed by: NURSE PRACTITIONER

## 2022-04-18 PROCEDURE — 0064A COVID-19,PF,MODERNA (18+ YRS BOOSTER .25ML): CPT | Performed by: NURSE PRACTITIONER

## 2022-04-18 PROCEDURE — 80061 LIPID PANEL: CPT | Performed by: NURSE PRACTITIONER

## 2022-04-18 PROCEDURE — 91306 COVID-19,PF,MODERNA (18+ YRS BOOSTER .25ML): CPT | Performed by: NURSE PRACTITIONER

## 2022-04-18 RX ORDER — ATORVASTATIN CALCIUM 10 MG/1
10 TABLET, FILM COATED ORAL DAILY
Qty: 90 TABLET | Refills: 3 | Status: SHIPPED | OUTPATIENT
Start: 2022-04-18 | End: 2023-04-06

## 2022-04-18 RX ORDER — LISINOPRIL/HYDROCHLOROTHIAZIDE 10-12.5 MG
1 TABLET ORAL DAILY
Qty: 90 TABLET | Refills: 3 | Status: SHIPPED | OUTPATIENT
Start: 2022-04-18 | End: 2023-04-06

## 2022-04-18 ASSESSMENT — ASTHMA QUESTIONNAIRES
ACT_TOTALSCORE: 24
QUESTION_3 LAST FOUR WEEKS HOW OFTEN DID YOUR ASTHMA SYMPTOMS (WHEEZING, COUGHING, SHORTNESS OF BREATH, CHEST TIGHTNESS OR PAIN) WAKE YOU UP AT NIGHT OR EARLIER THAN USUAL IN THE MORNING: NOT AT ALL
QUESTION_4 LAST FOUR WEEKS HOW OFTEN HAVE YOU USED YOUR RESCUE INHALER OR NEBULIZER MEDICATION (SUCH AS ALBUTEROL): NOT AT ALL
QUESTION_1 LAST FOUR WEEKS HOW MUCH OF THE TIME DID YOUR ASTHMA KEEP YOU FROM GETTING AS MUCH DONE AT WORK, SCHOOL OR AT HOME: NONE OF THE TIME
ACT_TOTALSCORE: 24
QUESTION_2 LAST FOUR WEEKS HOW OFTEN HAVE YOU HAD SHORTNESS OF BREATH: ONCE OR TWICE A WEEK
QUESTION_5 LAST FOUR WEEKS HOW WOULD YOU RATE YOUR ASTHMA CONTROL: COMPLETELY CONTROLLED

## 2022-04-18 ASSESSMENT — PAIN SCALES - GENERAL: PAINLEVEL: NO PAIN (0)

## 2022-04-18 NOTE — PROGRESS NOTES
Assessment & Plan     Essential hypertension  Well controlled.  - BASIC METABOLIC PANEL; Future  - lisinopril-hydrochlorothiazide (ZESTORETIC) 10-12.5 MG tablet; Take 1 tablet by mouth daily  - BASIC METABOLIC PANEL    Hyperlipidemia LDL goal <130  Recheck lipids today.  - atorvastatin (LIPITOR) 10 MG tablet; Take 1 tablet (10 mg) by mouth daily    Screen for colon cancer  - Fecal colorectal cancer screen FIT - Future (S+30); Future      The risks, benefits and treatment options of prescribed medications or other treatments have been discussed with the patient. The patient verbalized their understanding and should call or follow up if no improvement or if they develop further problems.    ESTHER [facility]            David Guillaume is a 61 year old who presents for the following health issues     History of Present Illness       Hypertension: He presents for follow up of hypertension.  He does not check blood pressure  regularly outside of the clinic. Outside blood pressures have been over 140/90. He does not follow a low salt diet.     He eats 0-1 servings of fruits and vegetables daily.He consumes 1 sweetened beverage(s) daily.He exercises with enough effort to increase his heart rate 10 to 19 minutes per day.  He exercises with enough effort to increase his heart rate 5 days per week.   He is taking medications regularly.       Hyperlipidemia Follow-Up      Are you regularly taking any medication or supplement to lower your cholesterol?   Yes- Atorvastatin     Are you having muscle aches or other side effects that you think could be caused by your cholesterol lowering medication?  No    Hypertension Follow-up      Do you check your blood pressure regularly outside of the clinic? No     Are you following a low salt diet? No    Are your blood pressures ever more than 140 on the top number (systolic) OR more   than 90 on the bottom number (diastolic), for example  "140/90? Yes      BP Readings from Last 6 Encounters:   04/18/22 136/84   10/14/21 131/86   11/18/20 122/82   09/18/20 125/85   10/30/19 118/80   10/21/19 115/74           Review of Systems   Constitutional, HEENT, cardiovascular, pulmonary, gi and gu systems are negative, except as otherwise noted.      Objective    /84   Pulse 77   Temp 97.8  F (36.6  C) (Tympanic)   Resp 12   Ht 1.753 m (5' 9\")   Wt 105.7 kg (233 lb 1.6 oz)   SpO2 98%   BMI 34.42 kg/m    Body mass index is 34.42 kg/m .  Physical Exam   GENERAL: healthy, alert and no distress  HENT: ear canals and TM's normal, nose and mouth without ulcers or lesions  NECK: no adenopathy, no asymmetry, masses, or scars and thyroid normal to palpation  RESP: lungs clear to auscultation - no rales, rhonchi or wheezes  CV: regular rate and rhythm, normal S1 S2, no S3 or S4, no murmur, click or rub, no peripheral edema and peripheral pulses strong                "

## 2022-04-22 ENCOUNTER — TELEPHONE (OUTPATIENT)
Dept: ALLERGY | Facility: CLINIC | Age: 62
End: 2022-04-22
Payer: COMMERCIAL

## 2022-04-22 DIAGNOSIS — J45.40 MODERATE PERSISTENT ASTHMA, UNCOMPLICATED: Primary | ICD-10-CM

## 2022-04-22 RX ORDER — FLUTICASONE PROPIONATE AND SALMETEROL 250; 50 UG/1; UG/1
1 POWDER RESPIRATORY (INHALATION) EVERY 12 HOURS
Qty: 3 EACH | Refills: 1 | Status: SHIPPED | OUTPATIENT
Start: 2022-04-22 | End: 2022-10-24

## 2022-04-22 NOTE — TELEPHONE ENCOUNTER
Unable to cue up Advair.    Last office visit 10/2021 and informed to follow up in 1 year.     Will forward to provider to refill.     Delphine LU RN  Specialty/Allergy Clinics

## 2022-04-22 NOTE — TELEPHONE ENCOUNTER
fluticasone-salmeterol (ADVAIR) 250-50 MCG/DOSE inhaler    Unable to queue medication.    Last office visit: 10/14/2021 with prescribing provider:  Dr. Leiva    Future Office Visit:      [facility]  [facility]

## 2022-04-29 ENCOUNTER — LAB (OUTPATIENT)
Dept: LAB | Facility: CLINIC | Age: 62
End: 2022-04-29
Payer: COMMERCIAL

## 2022-04-29 DIAGNOSIS — R73.09 ELEVATED GLUCOSE: ICD-10-CM

## 2022-04-29 DIAGNOSIS — R73.03 PREDIABETES: Primary | ICD-10-CM

## 2022-04-29 LAB
HBA1C MFR BLD: 5.9 % (ref 0–5.6)
HOLD SPECIMEN: NORMAL

## 2022-04-29 PROCEDURE — 83036 HEMOGLOBIN GLYCOSYLATED A1C: CPT

## 2022-04-29 PROCEDURE — 36415 COLL VENOUS BLD VENIPUNCTURE: CPT

## 2022-05-02 ENCOUNTER — TELEPHONE (OUTPATIENT)
Dept: FAMILY MEDICINE | Facility: CLINIC | Age: 62
End: 2022-05-02
Payer: COMMERCIAL

## 2022-05-02 NOTE — TELEPHONE ENCOUNTER
Diabetes Education Scheduling Outreach #1:    Call to patient to schedule. Left message with phone number to call to schedule.    Also sent [employer] message for second attempt. Requested patient to call to schedule.    Jaqueline Tinoco OnCall  Diabetes and Nutrition Scheduling

## 2022-05-12 RX ORDER — ATORVASTATIN CALCIUM 10 MG/1
TABLET, FILM COATED ORAL
Qty: 90 TABLET | Refills: 0 | OUTPATIENT
Start: 2022-05-12

## 2022-05-12 RX ORDER — LISINOPRIL/HYDROCHLOROTHIAZIDE 10-12.5 MG
TABLET ORAL
Qty: 90 TABLET | Refills: 0 | OUTPATIENT
Start: 2022-05-12

## 2022-06-27 DIAGNOSIS — J31.0 CHRONIC RHINITIS: ICD-10-CM

## 2022-06-27 DIAGNOSIS — J32.8 OTHER CHRONIC SINUSITIS: ICD-10-CM

## 2022-06-27 RX ORDER — BUDESONIDE 0.25 MG/2ML
INHALANT ORAL
Qty: 120 ML | Refills: 3 | Status: SHIPPED | OUTPATIENT
Start: 2022-06-27 | End: 2022-10-27

## 2022-08-01 DIAGNOSIS — D80.4 IGM DEFICIENCY (H): ICD-10-CM

## 2022-08-01 RX ORDER — AZITHROMYCIN 250 MG/1
TABLET, FILM COATED ORAL
Qty: 36 TABLET | Refills: 3 | Status: SHIPPED | OUTPATIENT
Start: 2022-08-01 | End: 2022-10-27

## 2022-08-01 NOTE — TELEPHONE ENCOUNTER
Requested Prescriptions   Pending Prescriptions Disp Refills     azithromycin (ZITHROMAX) 250 MG tablet 36 tablet 3     Sig: Take one tablet by mouth once daily on Mondays, Wednesdays and Fridays.       There is no refill protocol information for this order        Last office visit: 10/14/2021 with prescribing provider:  Dr. Leiva    Future Office Visit:          [facility]  [facility]

## 2022-08-01 NOTE — TELEPHONE ENCOUNTER
Routing refill request to provider for review/approval because:  Drug not on the [facility] refill protocol       Delphine LU RN  Specialty/Allergy Clinics

## 2022-09-19 DIAGNOSIS — J45.40 MODERATE PERSISTENT ASTHMA, UNCOMPLICATED: Chronic | ICD-10-CM

## 2022-09-19 RX ORDER — MONTELUKAST SODIUM 10 MG/1
10 TABLET ORAL AT BEDTIME
Qty: 90 TABLET | Refills: 3 | Status: SHIPPED | OUTPATIENT
Start: 2022-09-19 | End: 2023-09-11

## 2022-09-19 NOTE — TELEPHONE ENCOUNTER
Routing refill request to provider for review/approval because:     Recent (6 mo) or future (30 days) visit within the authorizing provider's specialty    My chart message sent to remind him to schedule a follow up. Last office visit 10/14/21    Delphine LU RN  Specialty/Allergy Clinics

## 2022-09-19 NOTE — TELEPHONE ENCOUNTER
Requested Prescriptions   Pending Prescriptions Disp Refills     montelukast (SINGULAIR) 10 MG tablet 90 tablet 3     Sig: Take 1 tablet (10 mg) by mouth At Bedtime       There is no refill protocol information for this order        Last office visit: 10/14/2021 with prescribing provider:  Dr. Leiva    Future Office Visit:          [facility]  [facility]

## 2022-10-17 DIAGNOSIS — J32.8 OTHER CHRONIC SINUSITIS: ICD-10-CM

## 2022-10-17 DIAGNOSIS — J31.0 CHRONIC RHINITIS: ICD-10-CM

## 2022-10-17 RX ORDER — BUDESONIDE 0.25 MG/2ML
INHALANT ORAL
Qty: 120 ML | Refills: 3 | OUTPATIENT
Start: 2022-10-17

## 2022-10-17 NOTE — TELEPHONE ENCOUNTER
[facility] [place], [place]  Ph:  [phone]  Fax:  [phone]    budesonide (PULMICORT) 0.25 MG/2ML neb solution 90 day supply    Last office visit: 10/14/2021 with prescribing provider:  Dr. Leiva    Future Office Visit:          [facility]  [facility]

## 2022-10-17 NOTE — TELEPHONE ENCOUNTER
Routing refill request to provider for review/approval because:  Patient needs to be seen because it has been more than 1 year since last office visit.    Pt was last seen on 10/14/21. No appointment scheduled at this time. Pt was reminded on 9/19/22 to schedule a follow up.       Delphine LU RN  Specialty/Allergy Clinics

## 2022-10-17 NOTE — TELEPHONE ENCOUNTER
Declined. I have not seen the patient in more than 1 year. He either needs a follow-up appointment or can request further refills from PCP.    Jose Angel Leiva MD

## 2022-10-24 DIAGNOSIS — J45.40 MODERATE PERSISTENT ASTHMA, UNCOMPLICATED: ICD-10-CM

## 2022-10-24 RX ORDER — FLUTICASONE PROPIONATE AND SALMETEROL 250; 50 UG/1; UG/1
1 POWDER RESPIRATORY (INHALATION) EVERY 12 HOURS
Qty: 3 EACH | Refills: 1 | Status: SHIPPED | OUTPATIENT
Start: 2022-10-24 | End: 2022-10-27

## 2022-10-24 NOTE — TELEPHONE ENCOUNTER
Called and spoke with pt. Appointment scheduled for follow up.     Prescription approved per [facility] Refill Protocol.    Delphine LU RN  Specialty/Allergy Clinics

## 2022-10-24 NOTE — TELEPHONE ENCOUNTER
Requested Prescriptions   Pending Prescriptions Disp Refills     fluticasone-salmeterol (ADVAIR) 250-50 MCG/ACT inhaler 3 each 1     Sig: Inhale 1 puff into the lungs every 12 hours       There is no refill protocol information for this order        Last office visit: 10/14/2021 with prescribing provider:  Dr. Leiva    Future Office Visit:        [facility]

## 2022-10-27 ENCOUNTER — OFFICE VISIT (OUTPATIENT)
Dept: ALLERGY | Facility: CLINIC | Age: 62
End: 2022-10-27
Payer: COMMERCIAL

## 2022-10-27 VITALS
HEIGHT: 69 IN | OXYGEN SATURATION: 97 % | SYSTOLIC BLOOD PRESSURE: 120 MMHG | BODY MASS INDEX: 33.33 KG/M2 | DIASTOLIC BLOOD PRESSURE: 72 MMHG | HEART RATE: 104 BPM | WEIGHT: 225 LBS

## 2022-10-27 DIAGNOSIS — J45.40 MODERATE PERSISTENT ASTHMA, UNCOMPLICATED: Primary | ICD-10-CM

## 2022-10-27 DIAGNOSIS — Z23 NEED FOR PROPHYLACTIC VACCINATION AND INOCULATION AGAINST INFLUENZA: ICD-10-CM

## 2022-10-27 DIAGNOSIS — D80.4 IGM DEFICIENCY (H): ICD-10-CM

## 2022-10-27 DIAGNOSIS — J32.8 OTHER CHRONIC SINUSITIS: ICD-10-CM

## 2022-10-27 DIAGNOSIS — J31.0 CHRONIC RHINITIS: ICD-10-CM

## 2022-10-27 PROCEDURE — 90682 RIV4 VACC RECOMBINANT DNA IM: CPT | Performed by: ALLERGY & IMMUNOLOGY

## 2022-10-27 PROCEDURE — 90471 IMMUNIZATION ADMIN: CPT | Performed by: ALLERGY & IMMUNOLOGY

## 2022-10-27 PROCEDURE — 99214 OFFICE O/P EST MOD 30 MIN: CPT | Mod: 25 | Performed by: ALLERGY & IMMUNOLOGY

## 2022-10-27 RX ORDER — BUDESONIDE 0.25 MG/2ML
INHALANT ORAL
Qty: 120 ML | Refills: 3 | Status: SHIPPED | OUTPATIENT
Start: 2022-10-27 | End: 2023-07-27

## 2022-10-27 RX ORDER — AZELASTINE 1 MG/ML
1 SPRAY, METERED NASAL 2 TIMES DAILY PRN
Qty: 90 ML | Refills: 2 | Status: SHIPPED | OUTPATIENT
Start: 2022-10-27 | End: 2023-12-11

## 2022-10-27 RX ORDER — AZITHROMYCIN 250 MG/1
TABLET, FILM COATED ORAL
Qty: 36 TABLET | Refills: 3 | Status: SHIPPED | OUTPATIENT
Start: 2022-10-27 | End: 2023-10-27

## 2022-10-27 RX ORDER — ALBUTEROL SULFATE 90 UG/1
2-4 AEROSOL, METERED RESPIRATORY (INHALATION) EVERY 4 HOURS PRN
Qty: 18 G | Refills: 3 | Status: SHIPPED | OUTPATIENT
Start: 2022-10-27 | End: 2024-04-15

## 2022-10-27 RX ORDER — FLUTICASONE PROPIONATE AND SALMETEROL 250; 50 UG/1; UG/1
1 POWDER RESPIRATORY (INHALATION) EVERY 12 HOURS
Qty: 3 EACH | Refills: 3 | Status: SHIPPED | OUTPATIENT
Start: 2022-10-27 | End: 2023-11-14

## 2022-10-27 ASSESSMENT — ENCOUNTER SYMPTOMS
SHORTNESS OF BREATH: 0
WHEEZING: 0
EYE ITCHING: 0
EYE PAIN: 0
SINUS PRESSURE: 1
CHEST TIGHTNESS: 0
SINUS PAIN: 1
SORE THROAT: 0
EYE DISCHARGE: 0
RHINORRHEA: 1

## 2022-10-27 ASSESSMENT — ASTHMA QUESTIONNAIRES
QUESTION_3 LAST FOUR WEEKS HOW OFTEN DID YOUR ASTHMA SYMPTOMS (WHEEZING, COUGHING, SHORTNESS OF BREATH, CHEST TIGHTNESS OR PAIN) WAKE YOU UP AT NIGHT OR EARLIER THAN USUAL IN THE MORNING: NOT AT ALL
QUESTION_2 LAST FOUR WEEKS HOW OFTEN HAVE YOU HAD SHORTNESS OF BREATH: ONCE OR TWICE A WEEK
QUESTION_4 LAST FOUR WEEKS HOW OFTEN HAVE YOU USED YOUR RESCUE INHALER OR NEBULIZER MEDICATION (SUCH AS ALBUTEROL): ONCE A WEEK OR LESS
ACT_TOTALSCORE: 21
ACT_TOTALSCORE: 21
QUESTION_1 LAST FOUR WEEKS HOW MUCH OF THE TIME DID YOUR ASTHMA KEEP YOU FROM GETTING AS MUCH DONE AT WORK, SCHOOL OR AT HOME: A LITTLE OF THE TIME
QUESTION_5 LAST FOUR WEEKS HOW WOULD YOU RATE YOUR ASTHMA CONTROL: WELL CONTROLLED

## 2022-10-27 NOTE — LETTER
My Asthma Action Plan  Name: Markell Ramsey   YOB: 1960  Date:10/27/2022     My doctor: Jose Angel Leiva MD   My clinic: [facility]        My Control Medicine: Fluticasone propionate + salmeterol (Advair Diskus or Wixela Inhub) -  250/50 mcg 1 puff twice daily  Montelukast (Singulair) -  10 mg every evening  My Rescue Medicine: Albuterol nebulizer solution every 4 hrs as needed  Albuterol (Proair/Ventolin/Proventil) inhaler 2-4 puffs every 4 hrs as needed   My Asthma Severity: moderate persistent  Avoid your asthma triggers: upper respiratory infections               GREEN ZONE   Good Control    I feel good    No cough or wheeze    Can work, sleep and play without asthma symptoms       Take your asthma control medicine every day.     1. If exercise triggers your asthma, take your rescue medication    15 minutes before exercise or sports, and    During exercise if you have asthma symptoms  2. Spacer to use with inhaler: If you have a spacer, make sure to use it with your inhaler             YELLOW ZONE Getting Worse  I have ANY of these:    I do not feel good    Cough or wheeze    Chest feels tight    Wake up at night   1. Keep taking your Green Zone medications, and Start QVAR 80 mcg 2 puffs twice daily.   2.    3. Start taking your rescue medicine:    every 20 minutes for up to 1 hour. Then every 4 hours for 24-48 hours.  4. If you stay in the Yellow Zone for more than 48  hours, contact your doctor.             RED ZONE Medical Alert - Get Help  I have ANY of these:    I feel awful    Medicine is not helping    Breathing getting harder    Trouble walking or talking    Nose opens wide to breathe       1. Take your rescue medicine NOW  2. If your provider has prescribed an oral steroid medicine, start taking it NOW  3. Call your doctor NOW  4. If you are still in the Red Zone after 20 minutes and you have not reached your doctor:    Take your rescue medicine again and    Call 911 or go to the  emergency room right away    See your regular doctor within 2 weeks of an Emergency Room or Urgent Care visit for follow-up treatment.        Electronically signed by: Jose Angel Leiva, 10/27/2022               Annual Reminders:  Meet with Asthma Educator,  Flu Shot in the Fall, consider Pneumonia Vaccination for patients with asthma (aged 19 and older).    Pharmacy[facility]    [id number] - [facility] [address] AT [address] OF [facility]  [facility] - [facility] [address] AT PORTAL TO REGISTERED [facility] [address]  [id number] - [place], [place] - [address]                    Asthma Triggers  How To Control Things That Make Your Asthma Worse    Triggers are things that make your asthma worse.  Look at the list below to help you find your triggers and what you can do about them.  You can help prevent asthma flare-ups by staying away from your triggers.      Trigger                                                          What you can do   Cigarette Smoke  Tobacco smoke can make asthma worse. Do not allow smoking in your home, car or around you.  Be sure no one smokes at a child s day care or school.  If you smoke, ask your health care provider for ways to help you quit.  Ask family members to quit too.  Ask your health care provider for a referral to Quit Plan to help you quit smoking, or call 6-331-591-PLAN.     Colds, Flu, Bronchitis  These are common triggers of asthma. Wash your hands often.  Don t touch your eyes, nose or mouth.  Get a flu shot every year.     Dust Mites  These are tiny bugs that live in cloth or carpet. They are too small to see. Wash sheets and blankets in hot water every week.   Encase pillows and mattress in dust mite proof covers.  Avoid having carpet if you can. If you have carpet, vacuum weekly.   Use a dust mask and HEPA vacuum.   Pollen and Outdoor Mold  Some people are allergic to trees, grass, or weed pollen, or  molds. Try to keep your windows closed.  Limit time out doors when pollen count is high.   Ask you health care provider about taking medicine during allergy season.     Animal Dander  Some people are allergic to skin flakes, urine or saliva from pets with fur or feathers. Keep pets with fur or feathers out of your home.    If you can t keep the pet outdoors, then keep the pet out of your bedroom.  Keep the bedroom door closed.  Keep pets off cloth furniture and away from stuffed toys.     Mice, Rats, and Cockroaches  Some people are allergic to the waste from these pests.   Cover food and garbage.  Clean up spills and food crumbs.  Store grease in the refrigerator.   Keep food out of the bedroom.   Indoor Mold  This can be a trigger if your home has high moisture. Fix leaking faucets, pipes, or other sources of water.   Clean moldy surfaces.  Dehumidify basement if it is damp and smelly.   Smoke, Strong Odors, and Sprays  These can reduce air quality. Stay away from strong odors and sprays, such as perfume, powder, hair spray, paints, smoke incense, paint, cleaning products, candles and new carpet.   Exercise or Sports  Some people with asthma have this trigger. Be active!  Ask your doctor about taking medicine before sports or exercise to prevent symptoms.    Warm up for 5-10 minutes before and after sports or exercise.     Other Triggers of Asthma  Cold air:  Cover your nose and mouth with a scarf.  Sometimes laughing or crying can be a trigger.  Some medicines and food can trigger asthma.

## 2022-10-27 NOTE — LETTER
10/27/2022         RE: Markell Ramsey  [address] [id number]        Dear Colleague,    Thank you for referring your patient, Markell Ramsey, to the [facility]. Please see a copy of my visit note below.    SUBJECTIVE:                                                                   Markell Ramsey presents today to our Allergy Clinic at [facility] for a follow up visit. He is a 62 year old male with moderate persistent asthma, allergic rhinitis, recurrent sinus infection/bronchitis and decreased IgM.     November 2017:Normal CH 50. Normal total IgE.  Normal total IgG.  Normal IgG subclasses.   Normal total IgA level. Decreased IgM level on multiple occasions. Normal T and B cells (flow cytometry).  20/22 protective pneumococcal antibody levels. Protective tetanus antibody level.      SPT in 2007 was positive to molds, tree pollen, grass mix, and ragweed. IDs were positive for cat, roaches, dog, mite. Aspergillus mold, and marsh elder. Started by Dr. Whittington on allergen IT in 2007,  [facility] Whittington used to practice before. He received allergen immunotherapy on and off until 2010, which he found partially helpful.   Sinus CT in 2013, with mild-moderate sinus disease, and DNS on the left. The patient confirms nasal congestion L>R.  IMPRESSION:  Mild/moderate mucosal thickening in the paranasal sinuses as described above.  Slight worsening of the right maxillary sinus and worsening of the right frontal sinus since the previous exam, but clearing of the left sphenoid sinus.      He has been  7/7 days compliant with his controller medications (Advair 250/50 mcg 1 puff twice daily, montelukast 10 mg by mouth once daily, and albuterol as needed. QVAR is the Yellow Zone medicine. He hardly ever uses it. He hardly ever needs to use albuterol. He wakes up less than twice per month due to chest symptoms. Since the last visit, no  ED/PCP/urgent care/other specialist visits for asthma flare. The patient denies current chest tightness, cough, wheezing, or shortness of breath.   He takes azithromycin 250 mg by mouth once daily, three times a week, and he is doing well. He had bronchitis within the past month, but it was short lasting. He already recovers. He has mild nasal congestion, postnasal drainage, rhinorrhea, sinus pressure, and sneezing from that episode. He uses budesonide rinses twice daily. He uses azelastine as needed, which is rare. He takes cetirizine as needed.   He is not interested in taking azithromycin in colder seasons only. He prefers to take it all year round.      Patient Active Problem List   Diagnosis     Gastroesophageal reflux disease without esophagitis     Non morbid obesity due to excess calories     IgM deficiency (H)     Moderate persistent asthma, uncomplicated     Essential hypertension     Hyperlipidemia LDL goal <130     DNS (deviated nasal septum)     Other chronic rhinitis     Pre-diabetes       Past Medical History:   Diagnosis Date     Acute bronchitis      Allergic rhinitis, cause unspecified 9/23/2008     Allergic rhinitis, unspecified allergic rhinitis type 9/23/2008     Problem list name updated by automated process. Provider to review     Esophageal reflux      Pneumonia 2/24/2013    MRSA, required hospitalization     Unspecified asthma(493.90)       Problem (# of Occurrences) Relation (Name,Age of Onset)    Allergies (1) Mother: asthma    Asthma (1) Daughter    Cancer (1) Father: lung    Respiratory (3) Sister: asthma, Son (Ludante): Upper Resp infections, Son (David): asthma        Past Surgical History:   Procedure Laterality Date     SURGICAL HISTORY OF -   2/23/06    L4-5 microdiscectomy     TONSILLECTOMY & ADENOIDECTOMY       ZZC APPENDECTOMY  08/2006     Social History     Socioeconomic History     Marital status:      Spouse name: None     Number of children: None     Years of education:  None     Highest education level: None   Occupational History     Employer: [employer]   Tobacco Use     Smoking status: Never     Smokeless tobacco: Never   Vaping Use     Vaping Use: Never used   Substance and Sexual Activity     Alcohol use: Yes     Comment: rare     Drug use: No     Sexual activity: Yes     Partners: Female   Social History Narrative    October 27, 2022        ENVIRONMENTAL HISTORY: [place] The home is heated with a forced air and gas furnace. They does have central air conditioning. The patient's bedroom is furnished with hard naseem in bedroom, allergen mattress cover and allergen pillowcase cover.  Has 2 rabbit  inside the house since Summer 2020. There is no history of cockroach or mice infestation. There are no smokers in the house.  The house does not have a damp basement.         Ibis Quintero MA           Review of Systems   HENT: Positive for congestion, postnasal drip, rhinorrhea, sinus pressure, sinus pain and sneezing. Negative for ear pain and sore throat.    Eyes: Negative for pain, discharge, itching and visual disturbance.   Respiratory: Negative for chest tightness, shortness of breath and wheezing.    All other systems reviewed and are negative.          Current Outpatient Medications:      albuterol (VENTOLIN HFA) 108 (90 Base) MCG/ACT inhaler, Inhale 2-4 puffs into the lungs every 4 hours as needed for shortness of breath / dyspnea Take before exercise., Disp: 18 g, Rfl: 3     atorvastatin (LIPITOR) 10 MG tablet, Take 1 tablet (10 mg) by mouth daily, Disp: 90 tablet, Rfl: 3     azelastine (ASTELIN) 0.1 % nasal spray, Spray 1 spray into both nostrils 2 times daily as needed for rhinitis or allergies, Disp: 90 mL, Rfl: 2     azithromycin (ZITHROMAX) 250 MG tablet, Take one tablet by mouth once daily on Mondays, Wednesdays and Fridays., Disp: 36 tablet, Rfl: 3     beclomethasone HFA (QVAR REDIHALER) 80 MCG/ACT inhaler, Inhale  2 puffs into the lungs 2 times daily With asthma flares only, Disp: 10.6 g, Rfl: 3     budesonide (PULMICORT) 0.25 MG/2ML neb solution, Mix respule of 0.25mg/2 mL budesonide vial in 8oz normal saline sinus rinse bottle. Irrigate each nostril with half of the bottle once daily., Disp: 120 mL, Rfl: 3     Cetirizine HCl (ZYRTEC PO), Take by mouth daily, Disp: , Rfl:      ESOMEPRAZOLE MAGNESIUM PO, Take 20 mg by mouth every morning (before breakfast), Disp: , Rfl:      fluticasone-salmeterol (ADVAIR) 250-50 MCG/ACT inhaler, Inhale 1 puff into the lungs every 12 hours, Disp: 3 each, Rfl: 3     lisinopril-hydrochlorothiazide (ZESTORETIC) 10-12.5 MG tablet, Take 1 tablet by mouth daily, Disp: 90 tablet, Rfl: 3     montelukast (SINGULAIR) 10 MG tablet, Take 1 tablet (10 mg) by mouth At Bedtime, Disp: 90 tablet, Rfl: 3     Respiratory Therapy Supplies (NEBULIZER/TUBING/MOUTHPIECE) KIT, use with nebulizer, Disp: 1 kit, Rfl: 11     triamcinolone (KENALOG) 0.1 % external ointment, Apply sparingly to affected area twice daily as needed not longer than 14 days in a row. Do not apply on face, neck, armpits and groin area., Disp: 80 g, Rfl: 1     ipratropium - albuterol 0.5 mg/2.5 mg/3 mL (DUONEB) 0.5-2.5 (3) MG/3ML neb solution, Take 1 vial (3 mLs) by nebulization every 6 hours as needed for shortness of breath / dyspnea or wheezing (Patient not taking: Reported on 10/16/2018), Disp: 30 vial, Rfl: 1  Immunization History   Administered Date(s) Administered     COVID-19INOCENCIA,Little 03/05/2021     COVID-19,PF,Moderna 11/19/2021     COVID-19,PF,Moderna Booster 04/18/2022     Influenza (H1N1) 01/11/2010     Influenza (IIV3) PF 11/02/1998, 12/19/2006, 10/29/2008, 08/26/2009, 10/05/2010, 01/04/2013     Influenza Quad, Recombinant, pf(RIV4) (Flublok) 10/21/2019, 11/18/2020, 10/14/2021, 10/27/2022     Influenza Vaccine IM > 6 months Valent IIV4 (Alfuria,Fluzone) 10/23/2015, 09/21/2017, 09/06/2018     Influenza Vaccine IM Ages 6-35 Months 4  "Valent (PF) 01/30/2014     Pneumococcal 23 valent 01/29/2016     TD (ADULT, 7+) 12/02/2004, 04/18/2022     TDAP Vaccine (Adacel) 11/18/2008     Td (Adult), Adsorbed 09/29/1997     Varicella 10/15/2002, 11/11/2002     No Known Allergies  OBJECTIVE:                                                                 /72 (BP Location: Right arm, Patient Position: Sitting, Cuff Size: Adult Large)   Pulse 104   Ht 1.753 m (5' 9\")   Wt 102.1 kg (225 lb)   SpO2 97%   BMI 33.23 kg/m          Physical Exam  Vitals and nursing note reviewed.   Constitutional:       General: He is not in acute distress.     Appearance: He is obese. He is not diaphoretic.   HENT:      Head: Normocephalic and atraumatic.      Right Ear: Tympanic membrane, ear canal and external ear normal.      Left Ear: Tympanic membrane, ear canal and external ear normal.      Nose: Septal deviation (leftward with septal spur) present. No mucosal edema or rhinorrhea.      Right Turbinates: Not enlarged, swollen or pale.      Left Turbinates: Not enlarged, swollen or pale.      Mouth/Throat:      Lips: Pink.      Mouth: Mucous membranes are moist.      Pharynx: Oropharynx is clear. No pharyngeal swelling, oropharyngeal exudate or posterior oropharyngeal erythema.   Eyes:      General:         Right eye: No discharge.         Left eye: No discharge.      Conjunctiva/sclera: Conjunctivae normal.   Pulmonary:      Effort: Pulmonary effort is normal. No respiratory distress.      Breath sounds: Normal breath sounds and air entry. No stridor, decreased air movement or transmitted upper airway sounds. No decreased breath sounds, wheezing, rhonchi or rales.   Musculoskeletal:         General: Normal range of motion.   Lymphadenopathy:      Cervical: No cervical adenopathy.   Neurological:      Mental Status: He is alert and oriented to person, place, and time.   Psychiatric:         Mood and Affect: Mood normal.         Behavior: Behavior normal. "         ASSESSMENT/PLAN:    Moderate persistent asthma, uncomplicated  Overall, symptoms are well controlled with Advair 250/50 micrograms 1 puff twice daily, montelukast 10 mg by mouth once daily, Qvar and Yellow Zone, and albuterol as needed.  - Continue as is.  - Ordered interval pre and postbronchodilator spirometry.    - General PFT Lab (Please always keep checked)  - Pulmonary Function Test  - fluticasone-salmeterol (ADVAIR) 250-50 MCG/ACT inhaler  Dispense: 3 each; Refill: 3  - albuterol (VENTOLIN HFA) 108 (90 Base) MCG/ACT inhaler  Dispense: 18 g; Refill: 3  - beclomethasone HFA (QVAR REDIHALER) 80 MCG/ACT inhaler  Dispense: 10.6 g; Refill: 3    Other chronic sinusitis  Chronic rhinitis  IgM deficiency (H)    IgM deficiency causing recurrent sinusitis and recurrent bronchitis well-controlled with azithromycin on Mondays, Wednesdays, and Fridays.  In addition, he uses budesonide 0.25 mg / 2 mL twice daily, cetirizine as needed, and azelastine as needed.  - Continue with azithromycin as is.  - Try decreasing budesonide irrigations to once daily.  - Continue with cetirizine and azelastine as needed.      - budesonide (PULMICORT) 0.25 MG/2ML neb solution  Dispense: 120 mL; Refill: 3  - azelastine (ASTELIN) 0.1 % nasal spray  Dispense: 90 mL; Refill: 2  - azithromycin (ZITHROMAX) 250 MG tablet  Dispense: 36 tablet; Refill: 3    Need for prophylactic vaccination and inoculation against influenza    - INFLUENZA QUAD, RECOMBINANT, P-FREE (RIV4) (FLUBLOK)       Return in about 1 year (around 10/27/2023), or if symptoms worsen or fail to improve.    Thank you for allowing us to participate in the care of this patient. Please feel free to contact us if there are any questions or concerns about the patient.    Disclaimer: This note consists of symbols derived from keyboarding, dictation and/or voice recognition software. As a result, there may be errors in the script that have gone undetected. Please consider this when  interpreting information found in this chart.    Jose Angel Leiva MD, FAAAAI, FACAAI  Allergy, Asthma and Immunology     [facility]       Again, thank you for allowing me to participate in the care of your patient.        Sincerely,        Jose Angel Leiva MD

## 2022-10-27 NOTE — NURSING NOTE
Chief Complaint   Patient presents with     Asthma     Asthma follow up       Vitals:    10/27/22 0908   Pulse: 104   SpO2: 97%   Weight: 102.1 kg (225 lb)     Wt Readings from Last 1 Encounters:   10/27/22 102.1 kg (225 lb)       Ibis Quintero MA

## 2022-10-27 NOTE — PROGRESS NOTES
SUBJECTIVE:                                                                   Markell Ramsey presents today to our Allergy Clinic at [facility] for a follow up visit. He is a 62 year old male with moderate persistent asthma, allergic rhinitis, recurrent sinus infection/bronchitis and decreased IgM.     November 2017:Normal CH 50. Normal total IgE.  Normal total IgG.  Normal IgG subclasses.   Normal total IgA level. Decreased IgM level on multiple occasions. Normal T and B cells (flow cytometry).  20/22 protective pneumococcal antibody levels. Protective tetanus antibody level.      SPT in 2007 was positive to molds, tree pollen, grass mix, and ragweed. IDs were positive for cat, roaches, dog, mite. Aspergillus mold, and marsh elder. Started by Dr. Whittington on allergen IT in 2007,  [facility] Whittington used to practice before. He received allergen immunotherapy on and off until 2010, which he found partially helpful.   Sinus CT in 2013, with mild-moderate sinus disease, and DNS on the left. The patient confirms nasal congestion L>R.  IMPRESSION:  Mild/moderate mucosal thickening in the paranasal sinuses as described above.  Slight worsening of the right maxillary sinus and worsening of the right frontal sinus since the previous exam, but clearing of the left sphenoid sinus.      He has been  7/7 days compliant with his controller medications (Advair 250/50 mcg 1 puff twice daily, montelukast 10 mg by mouth once daily, and albuterol as needed. QVAR is the Yellow Zone medicine. He hardly ever uses it. He hardly ever needs to use albuterol. He wakes up less than twice per month due to chest symptoms. Since the last visit, no ED/PCP/urgent care/other specialist visits for asthma flare. The patient denies current chest tightness, cough, wheezing, or shortness of breath.   He takes azithromycin 250 mg by mouth once daily, three times a week, and he is doing well. He had  bronchitis within the past month, but it was short lasting. He already recovers. He has mild nasal congestion, postnasal drainage, rhinorrhea, sinus pressure, and sneezing from that episode. He uses budesonide rinses twice daily. He uses azelastine as needed, which is rare. He takes cetirizine as needed.   He is not interested in taking azithromycin in colder seasons only. He prefers to take it all year round.      Patient Active Problem List   Diagnosis     Gastroesophageal reflux disease without esophagitis     Non morbid obesity due to excess calories     IgM deficiency (H)     Moderate persistent asthma, uncomplicated     Essential hypertension     Hyperlipidemia LDL goal <130     DNS (deviated nasal septum)     Other chronic rhinitis     Pre-diabetes       Past Medical History:   Diagnosis Date     Acute bronchitis      Allergic rhinitis, cause unspecified 9/23/2008     Allergic rhinitis, unspecified allergic rhinitis type 9/23/2008     Problem list name updated by automated process. Provider to review     Esophageal reflux      Pneumonia 2/24/2013    MRSA, required hospitalization     Unspecified asthma(493.90)       Problem (# of Occurrences) Relation (Name,Age of Onset)    Allergies (1) Mother: asthma    Asthma (1) Daughter    Cancer (1) Father: lung    Respiratory (3) Sister: asthma, Son (Luke): Upper Resp infections, Son (David): asthma        Past Surgical History:   Procedure Laterality Date     SURGICAL HISTORY OF -   2/23/06    L4-5 microdiscectomy     TONSILLECTOMY & ADENOIDECTOMY       ZZC APPENDECTOMY  08/2006     Social History     Socioeconomic History     Marital status:      Spouse name: None     Number of children: None     Years of education: None     Highest education level: None   Occupational History     Employer: [employer]   Tobacco Use     Smoking status: Never     Smokeless tobacco: Never   Vaping Use     Vaping Use: Never used   Substance and Sexual Activity     Alcohol use: Yes      Comment: rare     Drug use: No     Sexual activity: Yes     Partners: Female   Social History Narrative    October 27, 2022        ENVIRONMENTAL HISTORY: [place]. The home is heated with a forced air and gas furnace. They does have central air conditioning. The patient's bedroom is furnished with hard naseem in bedroom, allergen mattress cover and allergen pillowcase cover.  Has 2 rabbit  inside the house since Summer 2020. There is no history of cockroach or mice infestation. There are no smokers in the house.  The house does not have a damp basement.         Ibis Quintero MA           Review of Systems   HENT: Positive for congestion, postnasal drip, rhinorrhea, sinus pressure, sinus pain and sneezing. Negative for ear pain and sore throat.    Eyes: Negative for pain, discharge, itching and visual disturbance.   Respiratory: Negative for chest tightness, shortness of breath and wheezing.    All other systems reviewed and are negative.          Current Outpatient Medications:      albuterol (VENTOLIN HFA) 108 (90 Base) MCG/ACT inhaler, Inhale 2-4 puffs into the lungs every 4 hours as needed for shortness of breath / dyspnea Take before exercise., Disp: 18 g, Rfl: 3     atorvastatin (LIPITOR) 10 MG tablet, Take 1 tablet (10 mg) by mouth daily, Disp: 90 tablet, Rfl: 3     azelastine (ASTELIN) 0.1 % nasal spray, Spray 1 spray into both nostrils 2 times daily as needed for rhinitis or allergies, Disp: 90 mL, Rfl: 2     azithromycin (ZITHROMAX) 250 MG tablet, Take one tablet by mouth once daily on Mondays, Wednesdays and Fridays., Disp: 36 tablet, Rfl: 3     beclomethasone HFA (QVAR REDIHALER) 80 MCG/ACT inhaler, Inhale 2 puffs into the lungs 2 times daily With asthma flares only, Disp: 10.6 g, Rfl: 3     budesonide (PULMICORT) 0.25 MG/2ML neb solution, Mix respule of 0.25mg/2 mL budesonide vial in 8oz normal saline sinus rinse bottle. Irrigate each nostril with  half of the bottle once daily., Disp: 120 mL, Rfl: 3     Cetirizine HCl (ZYRTEC PO), Take by mouth daily, Disp: , Rfl:      ESOMEPRAZOLE MAGNESIUM PO, Take 20 mg by mouth every morning (before breakfast), Disp: , Rfl:      fluticasone-salmeterol (ADVAIR) 250-50 MCG/ACT inhaler, Inhale 1 puff into the lungs every 12 hours, Disp: 3 each, Rfl: 3     lisinopril-hydrochlorothiazide (ZESTORETIC) 10-12.5 MG tablet, Take 1 tablet by mouth daily, Disp: 90 tablet, Rfl: 3     montelukast (SINGULAIR) 10 MG tablet, Take 1 tablet (10 mg) by mouth At Bedtime, Disp: 90 tablet, Rfl: 3     Respiratory Therapy Supplies (NEBULIZER/TUBING/MOUTHPIECE) KIT, use with nebulizer, Disp: 1 kit, Rfl: 11     triamcinolone (KENALOG) 0.1 % external ointment, Apply sparingly to affected area twice daily as needed not longer than 14 days in a row. Do not apply on face, neck, armpits and groin area., Disp: 80 g, Rfl: 1     ipratropium - albuterol 0.5 mg/2.5 mg/3 mL (DUONEB) 0.5-2.5 (3) MG/3ML neb solution, Take 1 vial (3 mLs) by nebulization every 6 hours as needed for shortness of breath / dyspnea or wheezing (Patient not taking: Reported on 10/16/2018), Disp: 30 vial, Rfl: 1  Immunization History   Administered Date(s) Administered     COVID-19,PF,Little 03/05/2021     COVID-19,PF,Moderna 11/19/2021     COVID-19,PF,Moderna Booster 04/18/2022     Influenza (H1N1) 01/11/2010     Influenza (IIV3) PF 11/02/1998, 12/19/2006, 10/29/2008, 08/26/2009, 10/05/2010, 01/04/2013     Influenza Quad, Recombinant, pf(RIV4) (Flublok) 10/21/2019, 11/18/2020, 10/14/2021, 10/27/2022     Influenza Vaccine IM > 6 months Valent IIV4 (Alfuria,Fluzone) 10/23/2015, 09/21/2017, 09/06/2018     Influenza Vaccine IM Ages 6-35 Months 4 Valent (PF) 01/30/2014     Pneumococcal 23 valent 01/29/2016     TD (ADULT, 7+) 12/02/2004, 04/18/2022     TDAP Vaccine (Adacel) 11/18/2008     Td (Adult), Adsorbed 09/29/1997     Varicella 10/15/2002, 11/11/2002     No Known  "Allergies  OBJECTIVE:                                                                 /72 (BP Location: Right arm, Patient Position: Sitting, Cuff Size: Adult Large)   Pulse 104   Ht 1.753 m (5' 9\")   Wt 102.1 kg (225 lb)   SpO2 97%   BMI 33.23 kg/m          Physical Exam  Vitals and nursing note reviewed.   Constitutional:       General: He is not in acute distress.     Appearance: He is obese. He is not diaphoretic.   HENT:      Head: Normocephalic and atraumatic.      Right Ear: Tympanic membrane, ear canal and external ear normal.      Left Ear: Tympanic membrane, ear canal and external ear normal.      Nose: Septal deviation (leftward with septal spur) present. No mucosal edema or rhinorrhea.      Right Turbinates: Not enlarged, swollen or pale.      Left Turbinates: Not enlarged, swollen or pale.      Mouth/Throat:      Lips: Pink.      Mouth: Mucous membranes are moist.      Pharynx: Oropharynx is clear. No pharyngeal swelling, oropharyngeal exudate or posterior oropharyngeal erythema.   Eyes:      General:         Right eye: No discharge.         Left eye: No discharge.      Conjunctiva/sclera: Conjunctivae normal.   Pulmonary:      Effort: Pulmonary effort is normal. No respiratory distress.      Breath sounds: Normal breath sounds and air entry. No stridor, decreased air movement or transmitted upper airway sounds. No decreased breath sounds, wheezing, rhonchi or rales.   Musculoskeletal:         General: Normal range of motion.   Lymphadenopathy:      Cervical: No cervical adenopathy.   Neurological:      Mental Status: He is alert and oriented to person, place, and time.   Psychiatric:         Mood and Affect: Mood normal.         Behavior: Behavior normal.         ASSESSMENT/PLAN:    Moderate persistent asthma, uncomplicated  Overall, symptoms are well controlled with Advair 250/50 micrograms 1 puff twice daily, montelukast 10 mg by mouth once daily, Qvar and Yellow Zone, and albuterol as " needed.  - Continue as is.  - Ordered interval pre and postbronchodilator spirometry.    - General PFT Lab (Please always keep checked)  - Pulmonary Function Test  - fluticasone-salmeterol (ADVAIR) 250-50 MCG/ACT inhaler  Dispense: 3 each; Refill: 3  - albuterol (VENTOLIN HFA) 108 (90 Base) MCG/ACT inhaler  Dispense: 18 g; Refill: 3  - beclomethasone HFA (QVAR REDIHALER) 80 MCG/ACT inhaler  Dispense: 10.6 g; Refill: 3    Other chronic sinusitis  Chronic rhinitis  IgM deficiency (H)    IgM deficiency causing recurrent sinusitis and recurrent bronchitis well-controlled with azithromycin on Mondays, Wednesdays, and Fridays.  In addition, he uses budesonide 0.25 mg / 2 mL twice daily, cetirizine as needed, and azelastine as needed.  - Continue with azithromycin as is.  - Try decreasing budesonide irrigations to once daily.  - Continue with cetirizine and azelastine as needed.      - budesonide (PULMICORT) 0.25 MG/2ML neb solution  Dispense: 120 mL; Refill: 3  - azelastine (ASTELIN) 0.1 % nasal spray  Dispense: 90 mL; Refill: 2  - azithromycin (ZITHROMAX) 250 MG tablet  Dispense: 36 tablet; Refill: 3    Need for prophylactic vaccination and inoculation against influenza    - INFLUENZA QUAD, RECOMBINANT, P-FREE (RIV4) (FLUBLOK)       Return in about 1 year (around 10/27/2023), or if symptoms worsen or fail to improve.    Thank you for allowing us to participate in the care of this patient. Please feel free to contact us if there are any questions or concerns about the patient.    Disclaimer: This note consists of symbols derived from keyboarding, dictation and/or voice recognition software. As a result, there may be errors in the script that have gone undetected. Please consider this when interpreting information found in this chart.    Jose Angel Leiva MD, FAAAAI, FACAAI  Allergy, Asthma and Immunology     [facility]

## 2022-10-27 NOTE — PATIENT INSTRUCTIONS
Try decreasing Pulmicort irrigations to once daily.  Continue cetirizine and azelastine as needed.     Continue azithromycin as is.     Continue with Advair, montelukast, and albuterol as needed.   QVAR with exacerbations.    Call to schedule breathing test      [phone]    Do not use albuterol on the day of the breathing test if possible.

## 2022-11-30 ENCOUNTER — HOSPITAL ENCOUNTER (OUTPATIENT)
Dept: RESPIRATORY THERAPY | Facility: CLINIC | Age: 62
Discharge: HOME | End: 2022-11-30
Attending: INTERNAL MEDICINE | Admitting: INTERNAL MEDICINE
Payer: COMMERCIAL

## 2022-11-30 DIAGNOSIS — J45.40 MODERATE PERSISTENT ASTHMA, UNCOMPLICATED: ICD-10-CM

## 2022-11-30 LAB
EXPTIME-PRE: 7.2 SEC
FEF2575-%PRED-POST: 127 %
FEF2575-%PRED-PRE: 116 %
FEF2575-POST: 3.7 L/SEC
FEF2575-PRE: 3.38 L/SEC
FEF2575-PRED: 2.91 L/SEC
FEFMAX-%PRED-PRE: 118 %
FEFMAX-PRE: 10.63 L/SEC
FEFMAX-PRED: 8.98 L/SEC
FEV1-%PRED-PRE: 97 %
FEV1-PRE: 3.38 L
FEV1FEV6-PRE: 81 %
FEV1FEV6-PRED: 79 %
FEV1FVC-PRE: 81 %
FEV1FVC-PRED: 78 %
FIFMAX-PRE: 3.31 L/SEC
FVC-%PRED-PRE: 93 %
FVC-PRE: 4.18 L
FVC-PRED: 4.47 L

## 2022-11-30 PROCEDURE — 94060 EVALUATION OF WHEEZING: CPT

## 2022-11-30 PROCEDURE — 250N000009 HC RX 250: Performed by: ALLERGY & IMMUNOLOGY

## 2022-11-30 RX ORDER — ALBUTEROL SULFATE 0.83 MG/ML
2.5 SOLUTION RESPIRATORY (INHALATION) ONCE
Status: COMPLETED | OUTPATIENT
Start: 2022-11-30 | End: 2022-11-30

## 2022-11-30 RX ADMIN — ALBUTEROL SULFATE 2.5 MG: 2.5 SOLUTION RESPIRATORY (INHALATION) at 08:20

## 2022-11-30 NOTE — PROGRESS NOTES
Pre and post spirometry for Hx of asthma completed. Patient left the PFT Lab in no distress and without complaint

## 2022-12-01 NOTE — RESULT ENCOUNTER NOTE
[employer] message sent:   Spirometry does not suggest an obstruction.  No postbronchodilator reversibility noted.  - Continue the same management with Advair 250/50 micrograms 1 puff twice daily, montelukast 10 mg by mouth once daily, albuterol as needed, and adding Qvar in the Yellow Zone.

## 2022-12-19 ENCOUNTER — IMMUNIZATION (OUTPATIENT)
Dept: FAMILY MEDICINE | Facility: CLINIC | Age: 62
End: 2022-12-19
Payer: COMMERCIAL

## 2022-12-19 DIAGNOSIS — Z23 HIGH PRIORITY FOR 2019-NCOV VACCINE: Primary | ICD-10-CM

## 2022-12-19 PROCEDURE — 91313 COVID-19 VACCINE BIVALENT BOOSTER 18+ (MODERNA): CPT

## 2022-12-19 PROCEDURE — 0134A COVID-19 VACCINE BIVALENT BOOSTER 18+ (MODERNA): CPT

## 2022-12-19 PROCEDURE — 99207 PR NO CHARGE LOS: CPT

## 2022-12-28 NOTE — TELEPHONE ENCOUNTER
Lisinopril/HCTZ      Last Written Prescription Date: 02/14/17  Last Fill Quantity: 30, # refills: 0  Last Office Visit with G, P or [facility] prescribing provider: 02/15/17       Potassium   Date Value Ref Range Status   02/18/2016 3.9 3.4 - 5.3 mmol/L Final     Creatinine   Date Value Ref Range Status   02/18/2016 1.21 0.66 - 1.25 mg/dL Final     BP Readings from Last 3 Encounters:   02/15/17 133/71   07/15/16 132/79   07/07/16 104/74       
Medication is being filled for 1 time refill only due to:  Patient needs labs bmp, ordered.     Letter mailed to patient.    Shannon Thomas RN      
[Consultation] : a consultation visit

## 2023-03-06 ENCOUNTER — TELEPHONE (OUTPATIENT)
Dept: ALLERGY | Facility: CLINIC | Age: 63
End: 2023-03-06
Payer: COMMERCIAL

## 2023-03-06 DIAGNOSIS — J45.40 MODERATE PERSISTENT ASTHMA, UNCOMPLICATED: Primary | ICD-10-CM

## 2023-03-06 RX ORDER — FLUTICASONE PROPIONATE 110 UG/1
2 AEROSOL, METERED RESPIRATORY (INHALATION) 2 TIMES DAILY
Qty: 12 G | Refills: 3 | Status: SHIPPED | OUTPATIENT
Start: 2023-03-06 | End: 2023-07-06

## 2023-03-06 NOTE — TELEPHONE ENCOUNTER
[facility] [place], [place]  Ph:  [phone]  Fax:  [phone]    beclomethasone HFA (QVAR REDIHALER) 80 MCG/ACT inhaler    Pharmacy Comments:  ALTERNATIVE REQUESTED:  THIS NOT COVERED THEY PREFER FLOVENT HFA, OR PULMICORT [facility] [phone], THANK YOU, LET US KNOW.    Mikki Cary  [facility]

## 2023-03-07 NOTE — TELEPHONE ENCOUNTER
Please let the patient Qvar is not covered.  Instead of Qvar in Yellow Zone, I will send a prescription for Flovent 110 mcg 2 puffs twice daily to be used in Yellow Zone.      Jose Angel Leiva MD

## 2023-03-07 NOTE — TELEPHONE ENCOUNTER
Called and spoke with pt. Informed of change. Agreeable. No questions.     Delphine LU RN  Specialty/Allergy Clinics

## 2023-04-06 DIAGNOSIS — E78.5 HYPERLIPIDEMIA LDL GOAL <130: ICD-10-CM

## 2023-04-06 DIAGNOSIS — I10 ESSENTIAL HYPERTENSION: ICD-10-CM

## 2023-04-06 RX ORDER — LISINOPRIL/HYDROCHLOROTHIAZIDE 10-12.5 MG
TABLET ORAL
Qty: 90 TABLET | Refills: 1 | Status: SHIPPED | OUTPATIENT
Start: 2023-04-06 | End: 2023-09-25

## 2023-04-06 RX ORDER — ATORVASTATIN CALCIUM 10 MG/1
10 TABLET, FILM COATED ORAL DAILY
Qty: 90 TABLET | Refills: 0 | Status: SHIPPED | OUTPATIENT
Start: 2023-04-06 | End: 2023-10-17

## 2023-04-16 ENCOUNTER — HEALTH MAINTENANCE LETTER (OUTPATIENT)
Age: 63
End: 2023-04-16

## 2023-07-03 DIAGNOSIS — E78.5 HYPERLIPIDEMIA LDL GOAL <130: ICD-10-CM

## 2023-07-03 NOTE — TELEPHONE ENCOUNTER
"Routing refill request to provider for review/approval because:  Patient needs to be seen because it has been more than 1 year since last office visit.        Requested Prescriptions   Pending Prescriptions Disp Refills     atorvastatin (LIPITOR) 10 MG tablet [Pharmacy Med Name: ATORVASTATIN 10 MG TABLET] 90 tablet 0     Sig: TAKE 1 TABLET (10 MG) BY MOUTH DAILY.       Statins Protocol Failed - 7/3/2023 12:47 AM        Failed - LDL on file in past 12 months     Recent Labs   Lab Test 04/18/22  1016   LDL 94             Failed - Recent (12 mo) or future (30 days) visit within the authorizing provider's specialty     Patient has had an office visit with the authorizing provider or a provider within the authorizing providers department within the previous 12 mos or has a future within next 30 days. See \"Patient Info\" tab in inbasket, or \"Choose Columns\" in Meds & Orders section of the refill encounter.              Passed - No abnormal creatine kinase in past 12 months     No lab results found.             Passed - Medication is active on med list        Passed - Patient is age 18 or older                 Francesco Estevez RN 07/03/23 1:52 PM  "

## 2023-07-04 RX ORDER — ATORVASTATIN CALCIUM 10 MG/1
10 TABLET, FILM COATED ORAL DAILY
Qty: 90 TABLET | Refills: 0 | OUTPATIENT
Start: 2023-07-04

## 2023-07-04 NOTE — TELEPHONE ENCOUNTER
Denied med.  Patient needs to be seen.  Has not seen Dr. Lezama in 4 years.  Removed her from PCP field.  Sincerely,  Yaya Thomas MD

## 2023-07-06 DIAGNOSIS — J45.40 MODERATE PERSISTENT ASTHMA, UNCOMPLICATED: ICD-10-CM

## 2023-07-06 RX ORDER — FLUTICASONE PROPIONATE 110 UG/1
2 AEROSOL, METERED RESPIRATORY (INHALATION) 2 TIMES DAILY
Qty: 12 G | Refills: 3 | Status: SHIPPED | OUTPATIENT
Start: 2023-07-06 | End: 2024-01-23

## 2023-07-06 NOTE — TELEPHONE ENCOUNTER
Requested Prescriptions   Pending Prescriptions Disp Refills     fluticasone (FLOVENT HFA) 110 MCG/ACT inhaler 12 g 3     Sig: Inhale 2 puffs into the lungs 2 times daily In the Yellow Zone.       There is no refill protocol information for this order          Last office visit: 10/27/2022 ; last virtual visit: Visit date not found with prescribing provider:  Jose Angel Leiva  Future Office Visit:      Thank you,  Taryn Marshall  Specialty Clinic -   [facility]

## 2023-07-06 NOTE — TELEPHONE ENCOUNTER
Routing refill request to provider for review/approval because:    Inhaled Steroids Protocol Failed 07/06/2023 11:42 AM   Protocol Details  Asthma control assessment score within normal limits in last 6 months    Recent (6 mo) or future (30 days) visit within the authorizing provider's specialty     LOV:  10/27/2022, recommended 1 year follow-up    Libertad GAYLE RN  Specialty/Allergy Clinic

## 2023-07-06 NOTE — TELEPHONE ENCOUNTER
It is yellow zone medication.  He may not necessarily need it all the time.  He does not need 90-day supply.    Jose Angel Leiva MD

## 2023-07-06 NOTE — TELEPHONE ENCOUNTER
Clarification received from pharmacy-    Comment:  Insurance Requires 90 day RX    Thank you,    Taryn Marshall  Specialty Clinic -   [facility]

## 2023-07-06 NOTE — TELEPHONE ENCOUNTER
Forwarding to provider to verify if this appropriate as this an as needed medication.    Delphine LU RN  Specialty/Allergy Clinics

## 2023-07-27 DIAGNOSIS — J31.0 CHRONIC RHINITIS: ICD-10-CM

## 2023-07-27 DIAGNOSIS — J32.8 OTHER CHRONIC SINUSITIS: ICD-10-CM

## 2023-07-27 RX ORDER — BUDESONIDE 0.25 MG/2ML
INHALANT ORAL
Qty: 120 ML | Refills: 3 | Status: SHIPPED | OUTPATIENT
Start: 2023-07-27 | End: 2023-07-31

## 2023-07-27 NOTE — TELEPHONE ENCOUNTER
Routing refill request to provider for review/approval because:    Inhaled Steroids Protocol [id number]/27/2023 08:22 AM   Protocol Details Asthma control assessment score within normal limits in last 6 months    Recent (6 mo) or future (30 days) visit within the authorizing provider's specialty       Pt is not due for follow up until 10/2023.    Delphine LU RN  Specialty/Allergy Clinics

## 2023-07-27 NOTE — TELEPHONE ENCOUNTER
Requested Prescriptions   Pending Prescriptions Disp Refills    budesonide (PULMICORT) 0.25 MG/2ML neb solution 120 mL 3     Sig: Mix respule of 0.25mg/2 mL budesonide vial in 8oz normal saline sinus rinse bottle. Irrigate each nostril with half of the bottle once daily.       There is no refill protocol information for this order        Last office visit: 10/27/2022 ; last virtual visit: Visit date not found with prescribing provider:  Dr. Leiva    Future Office Visit:          Mikki Cary  [facility]

## 2023-07-28 ENCOUNTER — TELEPHONE (OUTPATIENT)
Dept: ALLERGY | Facility: CLINIC | Age: 63
End: 2023-07-28
Payer: COMMERCIAL

## 2023-07-28 DIAGNOSIS — J32.8 OTHER CHRONIC SINUSITIS: ICD-10-CM

## 2023-07-28 DIAGNOSIS — J31.0 CHRONIC RHINITIS: ICD-10-CM

## 2023-07-28 NOTE — TELEPHONE ENCOUNTER
Received fax from:  [facility] [place], [place]   Ph:  [phone]  Fax:  [phone]    budesonide (PULMICORT) 0.25 MG/2ML neb solution  90 day supply request.    Pharmacy comments:  ALTERNATIVE REQUESTED:  CAN WE GET A NEW ORDER FOR 90 DAYS WHICH IS 3 BOXES OR 180MLS. THANK YOU     Mikki Cary  [facility]

## 2023-07-28 NOTE — TELEPHONE ENCOUNTER
Forwarding to prescribing provider if appropriate.     Delphine LU RN  Specialty/Allergy Clinics

## 2023-07-31 RX ORDER — BUDESONIDE 0.25 MG/2ML
INHALANT ORAL
Qty: 360 ML | Refills: 1 | Status: SHIPPED | OUTPATIENT
Start: 2023-07-31 | End: 2023-10-27

## 2023-09-11 DIAGNOSIS — J45.40 MODERATE PERSISTENT ASTHMA, UNCOMPLICATED: Chronic | ICD-10-CM

## 2023-09-11 NOTE — TELEPHONE ENCOUNTER
Requested Prescriptions   Pending Prescriptions Disp Refills    montelukast (SINGULAIR) 10 MG tablet 90 tablet 3     Sig: Take 1 tablet (10 mg) by mouth At Bedtime       There is no refill protocol information for this order          Last office visit: 10/27/2022 ; last virtual visit: Visit date not found with prescribing provider:  Jose Angel Leiva   Future Office Visit:      Thank you,  Taryn Marshall

## 2023-09-12 RX ORDER — MONTELUKAST SODIUM 10 MG/1
10 TABLET ORAL AT BEDTIME
Qty: 90 TABLET | Refills: 3 | Status: SHIPPED | OUTPATIENT
Start: 2023-09-12 | End: 2023-10-27

## 2023-09-12 NOTE — TELEPHONE ENCOUNTER
Routing refill request to provider for review/approval because:  Leukotriene Inhibitors Protocol [id number]/11/2023 10:55 AM   Protocol Details Asthma control assessment score within normal limits in last 6 months    Recent (6 mo) or future (30 days) visit within the authorizing provider's specialty     Pt is not due for follow up until 10/2023.       Delphine LU RN  Specialty/Allergy Clinics

## 2023-09-23 ENCOUNTER — TELEPHONE (OUTPATIENT)
Dept: FAMILY MEDICINE | Facility: CLINIC | Age: 63
End: 2023-09-23
Payer: COMMERCIAL

## 2023-09-23 DIAGNOSIS — I10 ESSENTIAL HYPERTENSION: ICD-10-CM

## 2023-09-25 RX ORDER — LISINOPRIL/HYDROCHLOROTHIAZIDE 10-12.5 MG
TABLET ORAL
Qty: 30 TABLET | Refills: 0 | Status: SHIPPED | OUTPATIENT
Start: 2023-09-25 | End: 2023-10-17

## 2023-09-25 NOTE — TELEPHONE ENCOUNTER
"Markell is due for a Yearly Preventative visit. Thank you!    Saadia Sorto RN      Requested Prescriptions   Pending Prescriptions Disp Refills    lisinopril-hydrochlorothiazide (ZESTORETIC) 10-12.5 MG tablet [Pharmacy Med Name: LISINOPRIL-HCTZ 10-12.5 MG TAB] 90 tablet 1     Sig: TAKE 1 TABLET BY MOUTH EVERY DAY       Diuretics (Including Combos) Protocol Failed - 9/23/2023 12:27 PM        Failed - Recent (12 mo) or future (30 days) visit within the authorizing provider's specialty     Patient has had an office visit with the authorizing provider or a provider within the authorizing providers department within the previous 12 mos or has a future within next 30 days. See \"Patient Info\" tab in inbasket, or \"Choose Columns\" in Meds & Orders section of the refill encounter.              Failed - Normal serum creatinine on file in past 12 months     Recent Labs   Lab Test 04/18/22  1016   CR 0.83              Failed - Normal serum potassium on file in past 12 months     Recent Labs   Lab Test 04/18/22  1016   POTASSIUM 3.8                    Failed - Normal serum sodium on file in past 12 months     Recent Labs   Lab Test 04/18/22  1016                 Passed - Blood pressure under 140/90 in past 12 months     BP Readings from Last 3 Encounters:   10/27/22 120/72   04/18/22 136/84   10/14/21 131/86                 Passed - Medication is active on med list        Passed - Patient is age 18 or older       ACE Inhibitors (Including Combos) Protocol Failed - 9/23/2023 12:27 PM        Failed - Recent (12 mo) or future (30 days) visit within the authorizing provider's specialty     Patient has had an office visit with the authorizing provider or a provider within the authorizing providers department within the previous 12 mos or has a future within next 30 days. See \"Patient Info\" tab in inbasket, or \"Choose Columns\" in Meds & Orders section of the refill encounter.              Failed - Normal serum creatinine on " file in past 12 months     Recent Labs   Lab Test 04/18/22  1016   CR 0.83       Ok to refill medication if creatinine is low          Failed - Normal serum potassium on file in past 12 months     Recent Labs   Lab Test 04/18/22  1016   POTASSIUM 3.8             Passed - Blood pressure under 140/90 in past 12 months     BP Readings from Last 3 Encounters:   10/27/22 120/72   04/18/22 136/84   10/14/21 131/86                 Passed - Medication is active on med list        Passed - Patient is age 18 or older

## 2023-09-26 DIAGNOSIS — I10 ESSENTIAL HYPERTENSION: ICD-10-CM

## 2023-09-26 RX ORDER — LISINOPRIL/HYDROCHLOROTHIAZIDE 10-12.5 MG
1 TABLET ORAL DAILY
Qty: 30 TABLET | Refills: 0 | Status: CANCELLED | OUTPATIENT
Start: 2023-09-26

## 2023-09-26 NOTE — TELEPHONE ENCOUNTER
Pending Prescriptions:                       Disp   Refills    lisinopril-hydrochlorothiazide (ZESTORETI*30 tab*0            Sig: Take 1 tablet by mouth daily

## 2023-09-27 DIAGNOSIS — I10 ESSENTIAL HYPERTENSION: ICD-10-CM

## 2023-09-27 RX ORDER — LISINOPRIL/HYDROCHLOROTHIAZIDE 10-12.5 MG
1 TABLET ORAL DAILY
Qty: 30 TABLET | Refills: 0 | Status: CANCELLED | OUTPATIENT
Start: 2023-09-27

## 2023-10-16 ASSESSMENT — ENCOUNTER SYMPTOMS
FREQUENCY: 0
NAUSEA: 0
ARTHRALGIAS: 1
NERVOUS/ANXIOUS: 1
MYALGIAS: 0
CHILLS: 0
SHORTNESS OF BREATH: 0
PALPITATIONS: 0
HEARTBURN: 1
EYE PAIN: 0
DIZZINESS: 0
WEAKNESS: 0
DIARRHEA: 0
HEMATOCHEZIA: 0
CONSTIPATION: 0
FEVER: 0
DYSURIA: 0
JOINT SWELLING: 0
COUGH: 0
SORE THROAT: 0
PARESTHESIAS: 0
ABDOMINAL PAIN: 0
HEMATURIA: 0
HEADACHES: 0

## 2023-10-16 ASSESSMENT — ASTHMA QUESTIONNAIRES: ACT_TOTALSCORE: 25

## 2023-10-17 ENCOUNTER — ANCILLARY PROCEDURE (OUTPATIENT)
Dept: GENERAL RADIOLOGY | Facility: CLINIC | Age: 63
End: 2023-10-17
Attending: STUDENT IN AN ORGANIZED HEALTH CARE EDUCATION/TRAINING PROGRAM
Payer: COMMERCIAL

## 2023-10-17 ENCOUNTER — OFFICE VISIT (OUTPATIENT)
Dept: FAMILY MEDICINE | Facility: CLINIC | Age: 63
End: 2023-10-17
Payer: COMMERCIAL

## 2023-10-17 VITALS
WEIGHT: 225 LBS | BODY MASS INDEX: 33.33 KG/M2 | TEMPERATURE: 97.9 F | HEART RATE: 88 BPM | OXYGEN SATURATION: 97 % | RESPIRATION RATE: 18 BRPM | HEIGHT: 69 IN | DIASTOLIC BLOOD PRESSURE: 80 MMHG | SYSTOLIC BLOOD PRESSURE: 128 MMHG

## 2023-10-17 DIAGNOSIS — I10 ESSENTIAL HYPERTENSION: ICD-10-CM

## 2023-10-17 DIAGNOSIS — E66.811 CLASS 1 OBESITY DUE TO EXCESS CALORIES WITH SERIOUS COMORBIDITY AND BODY MASS INDEX (BMI) OF 33.0 TO 33.9 IN ADULT: ICD-10-CM

## 2023-10-17 DIAGNOSIS — Z12.11 SCREEN FOR COLON CANCER: ICD-10-CM

## 2023-10-17 DIAGNOSIS — E78.5 HYPERLIPIDEMIA LDL GOAL <130: ICD-10-CM

## 2023-10-17 DIAGNOSIS — M25.561 RIGHT KNEE PAIN, UNSPECIFIED CHRONICITY: ICD-10-CM

## 2023-10-17 DIAGNOSIS — Z23 IMMUNIZATION DUE: ICD-10-CM

## 2023-10-17 DIAGNOSIS — M17.11 ARTHRITIS OF RIGHT KNEE: Primary | ICD-10-CM

## 2023-10-17 DIAGNOSIS — E66.09 CLASS 1 OBESITY DUE TO EXCESS CALORIES WITH SERIOUS COMORBIDITY AND BODY MASS INDEX (BMI) OF 33.0 TO 33.9 IN ADULT: ICD-10-CM

## 2023-10-17 DIAGNOSIS — Z00.00 ROUTINE GENERAL MEDICAL EXAMINATION AT A HEALTH CARE FACILITY: Primary | ICD-10-CM

## 2023-10-17 LAB
ANION GAP SERPL CALCULATED.3IONS-SCNC: 8 MMOL/L (ref 7–15)
BASO+EOS+MONOS # BLD AUTO: NORMAL 10*3/UL
BASO+EOS+MONOS NFR BLD AUTO: NORMAL %
BASOPHILS # BLD AUTO: 0.1 10E3/UL (ref 0–0.2)
BASOPHILS NFR BLD AUTO: 1 %
BUN SERPL-MCNC: 14.2 MG/DL (ref 8–23)
CALCIUM SERPL-MCNC: 9.8 MG/DL (ref 8.8–10.2)
CHLORIDE SERPL-SCNC: 99 MMOL/L (ref 98–107)
CHOLEST SERPL-MCNC: 186 MG/DL
CREAT SERPL-MCNC: 1.15 MG/DL (ref 0.67–1.17)
DEPRECATED HCO3 PLAS-SCNC: 30 MMOL/L (ref 22–29)
EGFRCR SERPLBLD CKD-EPI 2021: 72 ML/MIN/1.73M2
EOSINOPHIL # BLD AUTO: 0.2 10E3/UL (ref 0–0.7)
EOSINOPHIL NFR BLD AUTO: 2 %
ERYTHROCYTE [DISTWIDTH] IN BLOOD BY AUTOMATED COUNT: 12 % (ref 10–15)
GLUCOSE SERPL-MCNC: 118 MG/DL (ref 70–99)
HBA1C MFR BLD: 6.5 % (ref 0–5.6)
HCT VFR BLD AUTO: 45.7 % (ref 40–53)
HDLC SERPL-MCNC: 44 MG/DL
HGB BLD-MCNC: 15.5 G/DL (ref 13.3–17.7)
IMM GRANULOCYTES # BLD: 0.1 10E3/UL
IMM GRANULOCYTES NFR BLD: 1 %
LDLC SERPL CALC-MCNC: 99 MG/DL
LYMPHOCYTES # BLD AUTO: 2 10E3/UL (ref 0.8–5.3)
LYMPHOCYTES NFR BLD AUTO: 20 %
MCH RBC QN AUTO: 29.5 PG (ref 26.5–33)
MCHC RBC AUTO-ENTMCNC: 33.9 G/DL (ref 31.5–36.5)
MCV RBC AUTO: 87 FL (ref 78–100)
MONOCYTES # BLD AUTO: 0.8 10E3/UL (ref 0–1.3)
MONOCYTES NFR BLD AUTO: 9 %
NEUTROPHILS # BLD AUTO: 6.6 10E3/UL (ref 1.6–8.3)
NEUTROPHILS NFR BLD AUTO: 68 %
NONHDLC SERPL-MCNC: 142 MG/DL
PLATELET # BLD AUTO: 324 10E3/UL (ref 150–450)
POTASSIUM SERPL-SCNC: 4 MMOL/L (ref 3.4–5.3)
RBC # BLD AUTO: 5.25 10E6/UL (ref 4.4–5.9)
SODIUM SERPL-SCNC: 137 MMOL/L (ref 135–145)
TRIGL SERPL-MCNC: 213 MG/DL
WBC # BLD AUTO: 9.7 10E3/UL (ref 4–11)

## 2023-10-17 PROCEDURE — 90480 ADMN SARSCOV2 VAC 1/ONLY CMP: CPT | Performed by: STUDENT IN AN ORGANIZED HEALTH CARE EDUCATION/TRAINING PROGRAM

## 2023-10-17 PROCEDURE — 85025 COMPLETE CBC W/AUTO DIFF WBC: CPT | Performed by: STUDENT IN AN ORGANIZED HEALTH CARE EDUCATION/TRAINING PROGRAM

## 2023-10-17 PROCEDURE — 73562 X-RAY EXAM OF KNEE 3: CPT | Mod: TC | Performed by: RADIOLOGY

## 2023-10-17 PROCEDURE — 36415 COLL VENOUS BLD VENIPUNCTURE: CPT | Performed by: STUDENT IN AN ORGANIZED HEALTH CARE EDUCATION/TRAINING PROGRAM

## 2023-10-17 PROCEDURE — 83036 HEMOGLOBIN GLYCOSYLATED A1C: CPT | Performed by: STUDENT IN AN ORGANIZED HEALTH CARE EDUCATION/TRAINING PROGRAM

## 2023-10-17 PROCEDURE — 99213 OFFICE O/P EST LOW 20 MIN: CPT | Mod: 25 | Performed by: STUDENT IN AN ORGANIZED HEALTH CARE EDUCATION/TRAINING PROGRAM

## 2023-10-17 PROCEDURE — 90471 IMMUNIZATION ADMIN: CPT | Performed by: STUDENT IN AN ORGANIZED HEALTH CARE EDUCATION/TRAINING PROGRAM

## 2023-10-17 PROCEDURE — 90682 RIV4 VACC RECOMBINANT DNA IM: CPT | Performed by: STUDENT IN AN ORGANIZED HEALTH CARE EDUCATION/TRAINING PROGRAM

## 2023-10-17 PROCEDURE — 99396 PREV VISIT EST AGE 40-64: CPT | Mod: 25 | Performed by: STUDENT IN AN ORGANIZED HEALTH CARE EDUCATION/TRAINING PROGRAM

## 2023-10-17 PROCEDURE — 80061 LIPID PANEL: CPT | Performed by: STUDENT IN AN ORGANIZED HEALTH CARE EDUCATION/TRAINING PROGRAM

## 2023-10-17 PROCEDURE — 91320 SARSCV2 VAC 30MCG TRS-SUC IM: CPT | Performed by: STUDENT IN AN ORGANIZED HEALTH CARE EDUCATION/TRAINING PROGRAM

## 2023-10-17 PROCEDURE — 80048 BASIC METABOLIC PNL TOTAL CA: CPT | Performed by: STUDENT IN AN ORGANIZED HEALTH CARE EDUCATION/TRAINING PROGRAM

## 2023-10-17 RX ORDER — LISINOPRIL/HYDROCHLOROTHIAZIDE 10-12.5 MG
1 TABLET ORAL DAILY
Qty: 90 TABLET | Refills: 3 | Status: SHIPPED | OUTPATIENT
Start: 2023-10-17

## 2023-10-17 RX ORDER — ATORVASTATIN CALCIUM 10 MG/1
10 TABLET, FILM COATED ORAL DAILY
Qty: 90 TABLET | Refills: 3 | Status: SHIPPED | OUTPATIENT
Start: 2023-10-17

## 2023-10-17 ASSESSMENT — ENCOUNTER SYMPTOMS
MYALGIAS: 0
ABDOMINAL PAIN: 0
HEADACHES: 0
SHORTNESS OF BREATH: 0
CONSTIPATION: 0
WEAKNESS: 0
PALPITATIONS: 0
FREQUENCY: 0
NAUSEA: 0
HEMATOCHEZIA: 0
COUGH: 0
DYSURIA: 0
DIZZINESS: 0
HEMATURIA: 0
PARESTHESIAS: 0
DIARRHEA: 0
ARTHRALGIAS: 1
JOINT SWELLING: 0
CHILLS: 0
HEARTBURN: 1
FEVER: 0
EYE PAIN: 0
SORE THROAT: 0
NERVOUS/ANXIOUS: 1

## 2023-10-17 ASSESSMENT — PAIN SCALES - GENERAL: PAINLEVEL: NO PAIN (1)

## 2023-10-17 NOTE — RESULT ENCOUNTER NOTE
Adele Ramsey,     It is a pleasure providing you with medical care. I have received and reviewed your x-ray  results, and have the following recommendations:     Based on the results of your x-ray you are suspected to have arthritis. I will place a referral to physical therapy for additional help with supportive care. It is ok for you to use warm compress, NSAIDs (aleve, advil, naproxen etc.) but take with plenty of food and water, and I will also send a prescription for a topical cream called Voltaren gel which sometimes helps with arthritic pain.     Sincerely,     Swathi Jin MD

## 2023-10-17 NOTE — PROGRESS NOTES
SUBJECTIVE:   CC: Markell is an 63 year old who presents for preventative health visit.       10/17/2023     8:44 AM   Additional Questions   Roomed by Kristin CARDENAS LPN   Accompanied by self         10/17/2023     8:44 AM   Patient Reported Additional Medications   Patient reports taking the following new medications Aleve PRN  Ibuprofen PRN       Healthy Habits:     Getting at least 3 servings of Calcium per day:  Yes    Bi-annual eye exam:  NO    Dental care twice a year:  Yes    Sleep apnea or symptoms of sleep apnea:  Daytime drowsiness    Diet:  Regular (no restrictions)    Frequency of exercise:  1 day/week    Duration of exercise:  Less than 15 minutes    Taking medications regularly:  Yes    Barriers to taking medications:  None    Medication side effects:  Not applicable    Additional concerns today:  Yes    Today's PHQ-2 Score:       10/16/2023     7:48 PM   PHQ-2 ( 1999 [employer])   Q1: Little interest or pleasure in doing things 0   Q2: Feeling down, depressed or hopeless 0   PHQ-2 Score 0   Q1: Little interest or pleasure in doing things Not at all   Q2: Feeling down, depressed or hopeless Not at all   PHQ-2 Score 0   Hyperlipidemia Follow-Up    Are you regularly taking any medication or supplement to lower your cholesterol?   Yes- Atorvastatin 10mg  Are you having muscle aches or other side effects that you think could be caused by your cholesterol lowering medication?  No    Hypertension Follow-up    Do you check your blood pressure regularly outside of the clinic? No   Are you following a low salt diet? No  Are your blood pressures ever more than 140 on the top number (systolic) OR more   than 90 on the bottom number (diastolic), for example 140/90? Not checking at home    Patient travels a lot for work, he can't really do home cooked meals. Is working on portion control to help with weight loss. His back pain does limit his ability to move/exercise. In the past he was able to bike a lot, but due to  arthritis in back his ability to ride a bike is limited.     Medication Followup of Lisinopril - hydrochlorothiazide 10-12.5mg  Taking Medication as prescribed: yes but would like to discuss getting something else because his insurance does not want to cover this medication anymore  Side Effects:  None  Medication Helping Symptoms:  yes  Medication Followup of Atorvastatin 10mg  Taking Medication as prescribed: yes  Side Effects:  None  Medication Helping Symptoms:  yes     Joint Pain  Onset: about a month ago it got real stiff after driving to [place]. Had stiffness for about 2 hours. Has had some stiffness off and on since then. Drove 4 hours last week and didn't have any issues. Has dull pain in the middle of the night that wakes him up.   Description:   Location: right knee  Character: Dull ache   Intensity: mild, severe, currently 1/10 on pain scale, at it's worst 5/10  Progression of Symptoms: better, intermittent  Accompanying Signs & Symptoms:  Other symptoms: was swollen when it first happened about  month ago  History:   Previous similar pain: not before this   Precipitating factors:   Trauma or overuse: no   Alleviating factors:  Improved by: nothing  Therapies Tried and outcome: has tried different leg pillows at night to help but doesn't seem effective. Has tried a massage gun and advil and aleve, advil seems to help more than the Aleve.    Denies any trauma to his knee. States that the pain in his knee occurs at night to a point where it wakes him up. Patient states that his symptoms are improving.     Have you ever done Advance Care Planning? (For example, a Health Directive, POLST, or a discussion with a medical provider or your loved ones about your wishes): No, advance care planning information given to patient to review.  Advanced care planning was discussed at today's visit.    Social History     Tobacco Use    Smoking status: Never    Smokeless tobacco: Never   Substance Use Topics    Alcohol  "use: Yes     Comment: rare             10/16/2023     7:48 PM   Alcohol Use   Prescreen: >3 drinks/day or >7 drinks/week? No       Last PSA: No results found for: \"PSA\"    Reviewed orders with patient. Reviewed health maintenance and updated orders accordingly - Yes  Lab work is in process    Reviewed and updated as needed this visit by clinical staff   Tobacco  Allergies  Meds              Reviewed and updated as needed this visit by Provider                   Review of Systems   Constitutional:  Negative for chills and fever.   HENT:  Negative for congestion, ear pain, hearing loss and sore throat.    Eyes:  Negative for pain and visual disturbance.   Respiratory:  Negative for cough and shortness of breath.    Cardiovascular:  Negative for chest pain, palpitations and peripheral edema.   Gastrointestinal:  Positive for heartburn. Negative for abdominal pain, constipation, diarrhea, hematochezia and nausea.   Genitourinary:  Negative for dysuria, frequency, genital sores, hematuria, impotence, penile discharge and urgency.   Musculoskeletal:  Positive for arthralgias. Negative for joint swelling and myalgias.   Skin:  Negative for rash.   Neurological:  Negative for dizziness, weakness, headaches and paresthesias.   Psychiatric/Behavioral:  Negative for mood changes. The patient is nervous/anxious.        OBJECTIVE:   /80 (BP Location: Right arm, Patient Position: Sitting, Cuff Size: Adult Large)   Pulse 88   Temp 97.9  F (36.6  C) (Tympanic)   Resp 18   Ht 1.755 m (5' 9.09\")   Wt 102.1 kg (225 lb)   SpO2 97%   BMI 33.14 kg/m      Physical Exam  Constitutional:       General: He is not in acute distress.  HENT:      Head: Normocephalic and atraumatic.      Right Ear: External ear normal.      Left Ear: External ear normal.      Mouth/Throat:      Mouth: Mucous membranes are moist.      Pharynx: Oropharynx is clear. No oropharyngeal exudate or posterior oropharyngeal erythema.   Eyes:      " Extraocular Movements: Extraocular movements intact.   Cardiovascular:      Rate and Rhythm: Normal rate and regular rhythm.      Heart sounds: Normal heart sounds.   Pulmonary:      Effort: Pulmonary effort is normal. No respiratory distress.      Breath sounds: Normal breath sounds. No wheezing or rhonchi.   Abdominal:      Palpations: Abdomen is soft. There is no mass.      Tenderness: There is no abdominal tenderness.   Musculoskeletal:         General: No deformity. Normal range of motion.      Cervical back: Normal range of motion and neck supple.   Skin:     General: Skin is warm.      Findings: No rash.   Neurological:      General: No focal deficit present.      Mental Status: He is alert and oriented to person, place, and time.   Psychiatric:         Mood and Affect: Mood normal.       Diagnostic Test Results:  Labs reviewed in Epic    ASSESSMENT/PLAN:     1. Routine general medical examination at a health care facility  2. Screen for colon cancer  - Fecal colorectal cancer screen FIT - Future (S+30); Future    3. Hyperlipidemia LDL goal <130  - refilled atorvastatin (LIPITOR) 10 MG tablet; Take 1 tablet (10 mg) by mouth daily  Dispense: 90 tablet; Refill: 3  - Lipid panel reflex to direct LDL Non-fasting    4. Immunization due  > administered the following today:   - COVID-19 12+ (2023-24) (PFIZER)  - INFLUENZA VACCINE 18-64Y (FLUBLOK)    5. Essential hypertension, currently at goal <130/90   - refilled lisinopril-hydrochlorothiazide (ZESTORETIC) 10-12.5 MG tablet; Take 1 tablet by mouth daily  Dispense: 90 tablet; Refill: 3  - BASIC METABOLIC PANEL    6. Right knee pain, unspecified chronicity  - XR Knee Right 3 Views; Future, if negative may consider referral to physical therapy for strengthening exercises  - CBC with platelets and differential    7. Class 1 obesity due to excess calories with serious comorbidity and body mass index (BMI) of 33.0 to 33.9 in adult  - Basic metabolic panel  (Ca, Cl, CO2,  "Creat, Gluc, K, Na, BUN); Future  - Hemoglobin A1c       Patient has been advised of split billing requirements and indicates understanding: Yes      COUNSELING:   Reviewed preventive health counseling, as reflected in patient instructions      BMI:   Estimated body mass index is 33.14 kg/m  as calculated from the following:    Height as of this encounter: 1.755 m (5' 9.09\").    Weight as of this encounter: 102.1 kg (225 lb).   Weight management plan: Discussed healthy diet and exercise guidelines      He reports that he has never smoked. He has never used smokeless tobacco.            LARISSA KOLB MD  [facility]  "

## 2023-10-18 NOTE — RESULT ENCOUNTER NOTE
Hello,     Can someone please help this patient schedule a follow-up appointment with me to discuss his new diagnosis of diabetes? Thank you so much!     -Swathi Ramsey,     It is a pleasure providing you with medical care. I have received and reviewed your lab results, and have the following recommendations:     Please schedule a follow-up appointment with me to discuss your lab results. Based on your A1c values, you have a formal diagnosis of diabetes. At that appointment we will have discuss starting you on certain medications and have a serious discussion regarding diet and exercise lifestyle changes.       Sincerely,     Swathi Jin MD

## 2023-10-27 ENCOUNTER — OFFICE VISIT (OUTPATIENT)
Dept: ALLERGY | Facility: CLINIC | Age: 63
End: 2023-10-27
Payer: COMMERCIAL

## 2023-10-27 ENCOUNTER — ALLIED HEALTH/NURSE VISIT (OUTPATIENT)
Dept: FAMILY MEDICINE | Facility: CLINIC | Age: 63
End: 2023-10-27
Payer: COMMERCIAL

## 2023-10-27 VITALS
DIASTOLIC BLOOD PRESSURE: 80 MMHG | SYSTOLIC BLOOD PRESSURE: 126 MMHG | TEMPERATURE: 97.3 F | WEIGHT: 228.8 LBS | BODY MASS INDEX: 33.7 KG/M2 | HEART RATE: 74 BPM | OXYGEN SATURATION: 96 %

## 2023-10-27 DIAGNOSIS — J31.0 CHRONIC RHINITIS: ICD-10-CM

## 2023-10-27 DIAGNOSIS — D80.4 IGM DEFICIENCY (H): ICD-10-CM

## 2023-10-27 DIAGNOSIS — R04.0 EPISTAXIS: ICD-10-CM

## 2023-10-27 DIAGNOSIS — Z23 ENCOUNTER FOR IMMUNIZATION: Primary | ICD-10-CM

## 2023-10-27 DIAGNOSIS — J32.8 OTHER CHRONIC SINUSITIS: ICD-10-CM

## 2023-10-27 DIAGNOSIS — J45.40 MODERATE PERSISTENT ASTHMA, UNCOMPLICATED: Primary | ICD-10-CM

## 2023-10-27 PROBLEM — E11.9 DIABETES MELLITUS, TYPE 2 (H): Status: ACTIVE | Noted: 2023-10-27

## 2023-10-27 LAB
FEF 25/75: NORMAL
FEV-1: NORMAL
FEV1/FVC: NORMAL
FVC: NORMAL

## 2023-10-27 PROCEDURE — 90750 HZV VACC RECOMBINANT IM: CPT

## 2023-10-27 PROCEDURE — 94010 BREATHING CAPACITY TEST: CPT | Performed by: ALLERGY & IMMUNOLOGY

## 2023-10-27 PROCEDURE — 99207 PR NO CHARGE NURSE ONLY: CPT

## 2023-10-27 PROCEDURE — 99214 OFFICE O/P EST MOD 30 MIN: CPT | Mod: 25 | Performed by: ALLERGY & IMMUNOLOGY

## 2023-10-27 PROCEDURE — 90677 PCV20 VACCINE IM: CPT

## 2023-10-27 PROCEDURE — 90472 IMMUNIZATION ADMIN EACH ADD: CPT

## 2023-10-27 PROCEDURE — 90471 IMMUNIZATION ADMIN: CPT

## 2023-10-27 RX ORDER — MONTELUKAST SODIUM 10 MG/1
10 TABLET ORAL AT BEDTIME
Qty: 90 TABLET | Refills: 3 | Status: SHIPPED | OUTPATIENT
Start: 2023-10-27 | End: 2024-07-26

## 2023-10-27 RX ORDER — CEPHALEXIN 250 MG/1
1 CAPSULE ORAL EVERY 12 HOURS
Qty: 60 EACH | Refills: 3 | Status: SHIPPED | OUTPATIENT
Start: 2023-10-27 | End: 2024-07-26

## 2023-10-27 RX ORDER — AZITHROMYCIN 250 MG/1
TABLET, FILM COATED ORAL
Qty: 36 TABLET | Refills: 3 | Status: SHIPPED | OUTPATIENT
Start: 2023-10-27 | End: 2024-07-26

## 2023-10-27 RX ORDER — BUDESONIDE 0.25 MG/2ML
INHALANT ORAL
Qty: 360 ML | Refills: 1 | Status: SHIPPED | OUTPATIENT
Start: 2023-10-27 | End: 2024-07-26

## 2023-10-27 ASSESSMENT — ENCOUNTER SYMPTOMS
EYE DISCHARGE: 0
ACTIVITY CHANGE: 0
FACIAL SWELLING: 0
DIZZINESS: 0
SORE THROAT: 1
JOINT SWELLING: 0
FATIGUE: 0
CHILLS: 0
DIARRHEA: 0
CONSTIPATION: 0
FEVER: 0
EYE REDNESS: 0
VOMITING: 0
LIGHT-HEADEDNESS: 0
WHEEZING: 0
SINUS PRESSURE: 0
RHINORRHEA: 0
EYE ITCHING: 0
HEADACHES: 0
SHORTNESS OF BREATH: 0
ABDOMINAL PAIN: 0
NAUSEA: 0
COUGH: 0
CHEST TIGHTNESS: 0

## 2023-10-27 ASSESSMENT — ASTHMA QUESTIONNAIRES: ACT_TOTALSCORE: 25

## 2023-10-27 NOTE — PATIENT INSTRUCTIONS
Continue with azithromycin in colder seasons. Continue with budesonide rinses once daily and saline rinses twice daily, and azelastine as needed.     Continue with montelukast 10 mg by mouth once daily, and Flovent in yellow zone.   Decrease Advair to 100/50 q puff twice daily.   -Continue using albuterol inhaler 2-4 puffs every 4 hours as needed for chest tightness/wheezing/shortness of breath/persistent cough.      Follow up in 3 months or sooner if needed.      Prescription Assistance  If you need assistance with your prescriptions (cost, coverage, etc) please contact: [place] [facility] [phone]           If labs have been ordered/completed, please allow 7-14 business days for final interpretation of results to be sent on My Chart, phone or mail. Some lab results can take up to 28 days for results.         Allergy Staff Appt Hours Shot Hours Locations    Physician      Jose Angel Leiva MD         Support Staff      Delphine Barlow MA     Tuesday:   [place] :  [place]: :         :  [place] 7-3     [place]        Tuesday: 7- :20        Wednesday: 7:20      : 7-4:10        Tuesday: :10        Thursday: 7-3:10     [facility]       Tues & Wed: :10       Thurs: 12-4:10       Fri:             [facility]  [address] [place] [address]  Appt Line: [phone]        [facility]  [address] [place] [address]  Appt Line: [phone]     Pulmonary Function Scheduling:  [place]: [phone]  [place]: [phone]  [phone]

## 2023-10-27 NOTE — PROGRESS NOTES
SUBJECTIVE:                                                                   Markell Ramsey presents today to our Allergy Clinic at [facility] for a follow up visit. He is a 63 year old male with moderate persistent asthma, allergic rhinitis, recurrent sinus infection/bronchitis and decreased IgM.     November 2017:Normal CH 50. Normal total IgE.  Normal total IgG.  Normal IgG subclasses.   Normal total IgA level. Decreased IgM level on multiple occasions. Normal T and B cells (flow cytometry).  20/22 protective pneumococcal antibody levels. Protective tetanus antibody level.      SPT in 2007 was positive to molds, tree pollen, grass mix, and ragweed. IDs were positive for cat, roaches, dog, mite. Aspergillus mold, and marsh elder. Started by Dr. Whittington on allergen IT in 2007,  [facility] Whittington [facility] to practice before. He received allergen immunotherapy on and off until 2010, which he found partially helpful.   Sinus CT in 2013, with mild-moderate sinus disease, and DNS on the left. The patient confirms nasal congestion L>R.  IMPRESSION:  Mild/moderate mucosal thickening in the paranasal sinuses as described above.  Slight worsening of the right maxillary sinus and worsening of the right frontal sinus since the previous exam, but clearing of the left sphenoid sinus.      He has been 7/7 days compliant with his controller medications (Advair 250/50 mcg 1 puff twice daily, montelukast 10 mg by mouth once daily, and albuterol as needed. Flovent is the Yellow Zone medicine). He hardly ever uses Flovent.  He hardly ever needs to use albuterol. He wakes up less than twice per month due to chest symptoms. Since the last visit, no ED/PCP/urgent care/other specialist visits for asthma flare. No prednisone since the last visit for asthma flares. The patient denies current chest tightness, cough, wheezing, or shortness of breath.   He takes azithromycin 250 mg by  mouth once daily, three times a week, and he is doing well.  He stopped it this Summer. In August, he developed bronchitis, manifested by a green cough. He restarted azithromycin 3 times a week in August.    He uses budesonide 0.25mg/2ml once daily and saline rinses twice daily. He rarely needs azelastine. He has nasal congestion, postnasal drainage, and sore throat on and off, 2 out 10 in severity. When he uses saline rinses, he sees pink stuff coming out several times a week. He feels there is a nasal bleed way up in his nose.     Patient Active Problem List   Diagnosis    Gastroesophageal reflux disease without esophagitis    Non morbid obesity due to excess calories    IgM deficiency (H)    Moderate persistent asthma, uncomplicated    Essential hypertension    Hyperlipidemia LDL goal <130    DNS (deviated nasal septum)    Other chronic rhinitis    Pre-diabetes    Arthritis of right knee    Diabetes mellitus, type 2 (H)       Past Medical History:   Diagnosis Date    Acute bronchitis     Allergic rhinitis, cause unspecified 09/23/2008    Allergic rhinitis, unspecified allergic rhinitis type 09/23/2008     Problem list name updated by automated process. Provider to review    Arthritis of right knee 10/17/2023    Diabetes mellitus, type 2 (H) 10/27/2023    Esophageal reflux     Hypertension 2014    Pneumonia 02/24/2013    MRSA, required hospitalization    Uncomplicated asthma 1972    Unspecified asthma(493.90)       Problem (# of Occurrences) Relation (Name,Age of Onset)    Allergies (1) Mother: asthma    Asthma (1) Daughter (Shirin)    Cancer (1) Father (Ruslan): lung    Respiratory (3) Sister: asthma, Son (Luke): Upper Resp infections, Son (David): asthma    Other Cancer (1) Father (Ruslan): Braulio          Past Surgical History:   Procedure Laterality Date    BACK SURGERY  2006    L4 / L5 Discectomy    SURGICAL HISTORY OF -   02/23/2006    L4-5 microdiscectomy    TONSILLECTOMY & ADENOIDECTOMY      ZZC APPENDECTOMY   08/01/2006     Social History     Socioeconomic History    Marital status:      Spouse name: None    Number of children: None    Years of education: None    Highest education level: None   Occupational History     Employer: [employer]   Tobacco Use    Smoking status: Never    Smokeless tobacco: Never   Vaping Use    Vaping Use: Never used   Substance and Sexual Activity    Alcohol use: Yes     Comment: rare    Drug use: No    Sexual activity: Yes     Partners: Female   Other Topics Concern    Parent/sibling w/ CABG, MI or angioplasty before 65F 55M? No   Social History Narrative    10/27/23        ENVIRONMENTAL HISTORY: [place] The home is heated with a forced air and gas furnace. They does have central air conditioning. The patient's bedroom is furnished with hard naseem in bedroom, allergen mattress cover and allergen pillowcase cover.  Has 2 rabbit  inside the house since Summer 2020. There is no history of cockroach or mice infestation. There are no smokers in the house.  The house does not have a damp basement.              Social Determinants of Health     Financial Resource Strain: Low Risk  (10/16/2023)    Financial Resource Strain     Within the past 12 months, have you or your family members you live with been unable to get utilities (heat, electricity) when it was really needed?: No   Food Insecurity: Low Risk  (10/16/2023)    Food Insecurity     Within the past 12 months, did you worry that your food would run out before you got money to buy more?: No     Within the past 12 months, did the food you bought just not last and you didn t have money to get more?: No   Transportation Needs: Low Risk  (10/16/2023)    Transportation Needs     Within the past 12 months, has lack of transportation kept you from medical appointments, getting your medicines, non-medical meetings or appointments, work, or from getting things that you need?: No   Interpersonal  Safety: Low Risk  (10/17/2023)    Interpersonal Safety     Do you feel physically and emotionally safe where you currently live?: Yes     Within the past 12 months, have you been hit, slapped, kicked or otherwise physically hurt by someone?: No     Within the past 12 months, have you been humiliated or emotionally abused in other ways by your partner or ex-partner?: No   Housing Stability: Low Risk  (10/16/2023)    Housing Stability     Do you have housing? : Yes     Are you worried about losing your housing?: No           Review of Systems   Constitutional:  Negative for activity change, chills, fatigue and fever.   HENT:  Positive for nosebleeds, postnasal drip and sore throat. Negative for congestion, ear pain, facial swelling, rhinorrhea, sinus pressure and sneezing.    Eyes:  Negative for discharge, redness and itching.   Respiratory:  Negative for cough, chest tightness, shortness of breath and wheezing.    Cardiovascular:  Negative for chest pain.   Gastrointestinal:  Negative for abdominal pain, constipation, diarrhea, nausea and vomiting.   Musculoskeletal:  Negative for joint swelling.   Skin:  Negative for rash.   Neurological:  Negative for dizziness, light-headedness and headaches.           Current Outpatient Medications:     ADVAIR DISKUS 100-50 MCG/ACT inhaler, Inhale 1 puff into the lungs every 12 hours, Disp: 60 each, Rfl: 3    atorvastatin (LIPITOR) 10 MG tablet, Take 1 tablet (10 mg) by mouth daily, Disp: 90 tablet, Rfl: 3    azithromycin (ZITHROMAX) 250 MG tablet, Take one tablet by mouth once daily on Mondays, Wednesdays and Fridays., Disp: 36 tablet, Rfl: 3    budesonide (PULMICORT) 0.25 MG/2ML neb solution, Mix respule of 0.25mg/2 mL budesonide vial in 8oz normal saline sinus rinse bottle. Irrigate each nostril with half of the bottle once daily., Disp: 360 mL, Rfl: 1    ESOMEPRAZOLE MAGNESIUM PO, Take 20 mg by mouth every morning (before breakfast), Disp: , Rfl:     fluticasone-salmeterol  (ADVAIR) 250-50 MCG/ACT inhaler, Inhale 1 puff into the lungs every 12 hours, Disp: 3 each, Rfl: 3    ipratropium - albuterol 0.5 mg/2.5 mg/3 mL (DUONEB) 0.5-2.5 (3) MG/3ML neb solution, Take 1 vial (3 mLs) by nebulization every 6 hours as needed for shortness of breath / dyspnea or wheezing, Disp: 30 vial, Rfl: 1    lisinopril-hydrochlorothiazide (ZESTORETIC) 10-12.5 MG tablet, Take 1 tablet by mouth daily, Disp: 90 tablet, Rfl: 3    montelukast (SINGULAIR) 10 MG tablet, Take 1 tablet (10 mg) by mouth At Bedtime, Disp: 90 tablet, Rfl: 3    Respiratory Therapy Supplies (NEBULIZER/TUBING/MOUTHPIECE) KIT, use with nebulizer, Disp: 1 kit, Rfl: 11    albuterol (VENTOLIN HFA) 108 (90 Base) MCG/ACT inhaler, Inhale 2-4 puffs into the lungs every 4 hours as needed for shortness of breath / dyspnea Take before exercise. (Patient not taking: Reported on 10/27/2023), Disp: 18 g, Rfl: 3    azelastine (ASTELIN) 0.1 % nasal spray, Spray 1 spray into both nostrils 2 times daily as needed for rhinitis or allergies (Patient not taking: Reported on 10/27/2023), Disp: 90 mL, Rfl: 2    Cetirizine HCl (ZYRTEC PO), Take by mouth daily (Patient not taking: Reported on 10/27/2023), Disp: , Rfl:     diclofenac (VOLTAREN) 1 % topical gel, Apply 4 g topically 4 times daily as needed for moderate pain (Patient not taking: Reported on 10/27/2023), Disp: 350 g, Rfl: 1    fluticasone (FLOVENT HFA) 110 MCG/ACT inhaler, Inhale 2 puffs into the lungs 2 times daily In the Yellow Zone. (Patient not taking: Reported on 10/27/2023), Disp: 12 g, Rfl: 3    triamcinolone (KENALOG) 0.1 % external ointment, Apply sparingly to affected area twice daily as needed not longer than 14 days in a row. Do not apply on face, neck, armpits and groin area. (Patient not taking: Reported on 10/27/2023), Disp: 80 g, Rfl: 1  Immunization History   Administered Date(s) Administered    COVID-19 12+ (2023-24) (Pfizer) 10/17/2023    COVID-19 Bivalent 18+ (Moderna) 12/19/2022     COVID-19 Monovalent 18+ (Moderna) 11/19/2021    COVID-19 Monovalent Booster 18+ (Moderna) 04/18/2022    COVID-19 Vaccine (Little) 03/05/2021    Influenza (H1N1) 01/11/2010    Influenza (IIV3) PF 11/02/1998, 12/19/2006, 10/29/2008, 08/26/2009, 10/05/2010, 01/04/2013    Influenza Vaccine 18-64 (Flublok) 10/21/2019, 11/18/2020, 10/14/2021, 10/27/2022, 10/17/2023    Influenza Vaccine >6 months (Alfuria,Fluzone) 10/23/2015, 09/21/2017, 09/06/2018    Influenza Vaccine IM Ages 6-35 Months 4 Valent (PF) 01/30/2014    Pneumococcal 23 valent 01/29/2016    TD,PF 7+ (Tenivac) 12/02/2004, 04/18/2022    TDAP Vaccine (Adacel) 11/18/2008    Td (Adult), Adsorbed 09/29/1997    Varicella 10/15/2002, 11/11/2002     Allergies   Allergen Reactions    Seasonal Allergies      Asthma and sinus issues     OBJECTIVE:                                                                 /80 (BP Location: Left arm, Patient Position: Sitting, Cuff Size: Adult Regular)   Pulse 74   Temp 97.3  F (36.3  C) (Tympanic)   Wt 103.8 kg (228 lb 12.8 oz)   SpO2 96%   BMI 33.70 kg/m          Physical Exam  Vitals and nursing note reviewed.   Constitutional:       General: He is not in acute distress.     Appearance: He is obese. He is not diaphoretic.   HENT:      Head: Normocephalic and atraumatic.      Right Ear: Tympanic membrane, ear canal and external ear normal.      Left Ear: Tympanic membrane, ear canal and external ear normal.      Nose: Septal deviation (leftward with septal spur) present. No mucosal edema or rhinorrhea.      Right Turbinates: Not enlarged, swollen or pale.      Left Turbinates: Not enlarged, swollen or pale.      Mouth/Throat:      Lips: Pink.      Mouth: Mucous membranes are moist.      Pharynx: Oropharynx is clear. No pharyngeal swelling, oropharyngeal exudate or posterior oropharyngeal erythema.   Eyes:      General:         Right eye: No discharge.         Left eye: No discharge.      Conjunctiva/sclera:  Conjunctivae normal.   Pulmonary:      Effort: Pulmonary effort is normal. No respiratory distress.      Breath sounds: Normal breath sounds and air entry. No stridor, decreased air movement or transmitted upper airway sounds. No decreased breath sounds, wheezing, rhonchi or rales.   Musculoskeletal:         General: Normal range of motion.   Neurological:      Mental Status: He is alert and oriented to person, place, and time.   Psychiatric:         Mood and Affect: Mood normal.         Behavior: Behavior normal.             WORKUP:  SPIROMETRY  initial FVC 3.98 L (87% of predicted)   initial FEV1 3.30 L (95% of predicted)  My interpretation:The office spirometry performed today doesn't suggest an obstruction.     Asthma Control Test (ACT) total score: ACT 25    ASSESSMENT/PLAN:    Moderate persistent asthma, uncomplicated    Currently well controlled with Advair 250/50 micrograms 1 puff twice daily, montelukast 10 mg by mouth once daily, Flovent in Yellow Zone, and albuterol as needed.  - Continue montelukast, Flovent, and albuterol as needed.  - Stepdown to Advair 100/50 mcg 1 puff twice daily.  We will follow-up in 3 months after stepping down.  He knows to restart Advair 250/50, if symptoms get worse.        - BREATHING CAPACITY TEST [41540]  - ADVAIR DISKUS 100-50 MCG/ACT inhaler  Dispense: 60 each; Refill: 3    Epistaxis    - Adult ENT  Referral    Chronic rhinitis  Other chronic sinusitis  IgM deficiency (H)    IgM deficiency causing recurrent sinusitis and recurrent bronchitis.  Fairly well-controlled with azithromycin on Mondays, Wednesdays, and Fridays, budesonide irrigations once daily, saline irrigations twice daily (1 time with budesonide), and azelastine as needed.  - Continue as is.  In Summer, he can try to stop azithromycin again for several months.      Follow-up in 3 months or sooner if needed.    Thank you for allowing us to participate in the care of this patient. Please feel free to  contact us if there are any questions or concerns about the patient.    Disclaimer: This note consists of symbols derived from keyboarding, dictation and/or voice recognition software. As a result, there may be errors in the script that have gone undetected. Please consider this when interpreting information found in this chart.    Jose Angel Leiva MD, FAAAAI, FACAAI  Allergy, Asthma and Immunology     [facility]

## 2023-10-27 NOTE — LETTER
10/27/2023         RE: Markell Ramsey  [address] [facility] [address]        Dear Colleague,    Thank you for referring your patient, Markell Ramsey, to [facility]. Please see a copy of my visit note below.    SUBJECTIVE:                                                                   Markell Ramsey presents today to our Allergy Clinic at [facility] for a follow up visit. He is a 63 year old male with moderate persistent asthma, allergic rhinitis, recurrent sinus infection/bronchitis and decreased IgM.     November 2017:Normal CH 50. Normal total IgE.  Normal total IgG.  Normal IgG subclasses.   Normal total IgA level. Decreased IgM level on multiple occasions. Normal T and B cells (flow cytometry).  20/22 protective pneumococcal antibody levels. Protective tetanus antibody level.      SPT in 2007 was positive to molds, tree pollen, grass mix, and ragweed. IDs were positive for cat, roaches, dog, mite. Aspergillus mold, and marsh elder. Started by Dr. Whittington on allergen IT in 2007,  [facility] Whittington used to practice before. He received allergen immunotherapy on and off until 2010, which he found partially helpful.   Sinus CT in 2013, with mild-moderate sinus disease, and DNS on the left. The patient confirms nasal congestion L>R.  IMPRESSION:  Mild/moderate mucosal thickening in the paranasal sinuses as described above.  Slight worsening of the right maxillary sinus and worsening of the right frontal sinus since the previous exam, but clearing of the left sphenoid sinus.      He has been 7/7 days compliant with his controller medications (Advair 250/50 mcg 1 puff twice daily, montelukast 10 mg by mouth once daily, and albuterol as needed. Flovent is the Yellow Zone medicine). He hardly ever uses Flovent.  He hardly ever needs to use albuterol. He wakes up less than twice per month due to chest symptoms. Since the last  visit, no ED/PCP/urgent care/other specialist visits for asthma flare. No prednisone since the last visit for asthma flares. The patient denies current chest tightness, cough, wheezing, or shortness of breath.   He takes azithromycin 250 mg by mouth once daily, three times a week, and he is doing well.  He stopped it this Summer. In August, he developed bronchitis, manifested by a green cough. He restarted azithromycin 3 times a week in August.    He uses budesonide 0.25mg/2ml once daily and saline rinses twice daily. He rarely needs azelastine. He has nasal congestion, postnasal drainage, and sore throat on and off, 2 out 10 in severity. When he uses saline rinses, he sees pink stuff coming out several times a week. He feels there is a nasal bleed way up in his nose.     Patient Active Problem List   Diagnosis     Gastroesophageal reflux disease without esophagitis     Non morbid obesity due to excess calories     IgM deficiency (H)     Moderate persistent asthma, uncomplicated     Essential hypertension     Hyperlipidemia LDL goal <130     DNS (deviated nasal septum)     Other chronic rhinitis     Pre-diabetes     Arthritis of right knee     Diabetes mellitus, type 2 (H)       Past Medical History:   Diagnosis Date     Acute bronchitis      Allergic rhinitis, cause unspecified 09/23/2008     Allergic rhinitis, unspecified allergic rhinitis type 09/23/2008     Problem list name updated by automated process. Provider to review     Arthritis of right knee 10/17/2023     Diabetes mellitus, type 2 (H) 10/27/2023     Esophageal reflux      Hypertension 2014     Pneumonia 02/24/2013    MRSA, required hospitalization     Uncomplicated asthma 1972     Unspecified asthma(493.90)       Problem (# of Occurrences) Relation (Name,Age of Onset)    Allergies (1) Mother: asthma    Asthma (1) Daughter (Shirin)    Cancer (1) Father (Ruslan): lung    Respiratory (3) Sister: asthma, Son (Ludante): Upper Resp infections, Son (David): asthma     Other Cancer (1) Father (Ruslan): Braulio          Past Surgical History:   Procedure Laterality Date     BACK SURGERY  2006    L4 / L5 Discectomy     SURGICAL HISTORY OF -   02/23/2006    L4-5 microdiscectomy     TONSILLECTOMY & ADENOIDECTOMY       ZZC APPENDECTOMY  08/01/2006     Social History     Socioeconomic History     Marital status:      Spouse name: None     Number of children: None     Years of education: None     Highest education level: None   Occupational History     Employer: [employer]   Tobacco Use     Smoking status: Never     Smokeless tobacco: Never   Vaping Use     Vaping Use: Never used   Substance and Sexual Activity     Alcohol use: Yes     Comment: rare     Drug use: No     Sexual activity: Yes     Partners: Female   Other Topics Concern     Parent/sibling w/ CABG, MI or angioplasty before 65F 55M? No   Social History Narrative    10/27/23        ENVIRONMENTAL HISTORY: [place] The home is heated with a forced air and gas furnace. They does have central air conditioning. The patient's bedroom is furnished with hard naseem in bedroom, allergen mattress cover and allergen pillowcase cover.  Has 2 rabbit  inside the house since Summer 2020. There is no history of cockroach or mice infestation. There are no smokers in the house.  The house does not have a damp basement.              Social Determinants of Health     Financial Resource Strain: Low Risk  (10/16/2023)    Financial Resource Strain      Within the past 12 months, have you or your family members you live with been unable to get utilities (heat, electricity) when it was really needed?: No   Food Insecurity: Low Risk  (10/16/2023)    Food Insecurity      Within the past 12 months, did you worry that your food would run out before you got money to buy more?: No      Within the past 12 months, did the food you bought just not last and you didn t have money to get more?: No    Transportation Needs: Low Risk  (10/16/2023)    Transportation Needs      Within the past 12 months, has lack of transportation kept you from medical appointments, getting your medicines, non-medical meetings or appointments, work, or from getting things that you need?: No   Interpersonal Safety: Low Risk  (10/17/2023)    Interpersonal Safety      Do you feel physically and emotionally safe where you currently live?: Yes      Within the past 12 months, have you been hit, slapped, kicked or otherwise physically hurt by someone?: No      Within the past 12 months, have you been humiliated or emotionally abused in other ways by your partner or ex-partner?: No   Housing Stability: Low Risk  (10/16/2023)    Housing Stability      Do you have housing? : Yes      Are you worried about losing your housing?: No           Review of Systems   Constitutional:  Negative for activity change, chills, fatigue and fever.   HENT:  Positive for nosebleeds, postnasal drip and sore throat. Negative for congestion, ear pain, facial swelling, rhinorrhea, sinus pressure and sneezing.    Eyes:  Negative for discharge, redness and itching.   Respiratory:  Negative for cough, chest tightness, shortness of breath and wheezing.    Cardiovascular:  Negative for chest pain.   Gastrointestinal:  Negative for abdominal pain, constipation, diarrhea, nausea and vomiting.   Musculoskeletal:  Negative for joint swelling.   Skin:  Negative for rash.   Neurological:  Negative for dizziness, light-headedness and headaches.           Current Outpatient Medications:      ADVAIR DISKUS 100-50 MCG/ACT inhaler, Inhale 1 puff into the lungs every 12 hours, Disp: 60 each, Rfl: 3     atorvastatin (LIPITOR) 10 MG tablet, Take 1 tablet (10 mg) by mouth daily, Disp: 90 tablet, Rfl: 3     azithromycin (ZITHROMAX) 250 MG tablet, Take one tablet by mouth once daily on Mondays, Wednesdays and Fridays., Disp: 36 tablet, Rfl: 3     budesonide (PULMICORT) 0.25 MG/2ML neb  solution, Mix respule of 0.25mg/2 mL budesonide vial in 8oz normal saline sinus rinse bottle. Irrigate each nostril with half of the bottle once daily., Disp: 360 mL, Rfl: 1     ESOMEPRAZOLE MAGNESIUM PO, Take 20 mg by mouth every morning (before breakfast), Disp: , Rfl:      fluticasone-salmeterol (ADVAIR) 250-50 MCG/ACT inhaler, Inhale 1 puff into the lungs every 12 hours, Disp: 3 each, Rfl: 3     ipratropium - albuterol 0.5 mg/2.5 mg/3 mL (DUONEB) 0.5-2.5 (3) MG/3ML neb solution, Take 1 vial (3 mLs) by nebulization every 6 hours as needed for shortness of breath / dyspnea or wheezing, Disp: 30 vial, Rfl: 1     lisinopril-hydrochlorothiazide (ZESTORETIC) 10-12.5 MG tablet, Take 1 tablet by mouth daily, Disp: 90 tablet, Rfl: 3     montelukast (SINGULAIR) 10 MG tablet, Take 1 tablet (10 mg) by mouth At Bedtime, Disp: 90 tablet, Rfl: 3     Respiratory Therapy Supplies (NEBULIZER/TUBING/MOUTHPIECE) KIT, use with nebulizer, Disp: 1 kit, Rfl: 11     albuterol (VENTOLIN HFA) 108 (90 Base) MCG/ACT inhaler, Inhale 2-4 puffs into the lungs every 4 hours as needed for shortness of breath / dyspnea Take before exercise. (Patient not taking: Reported on 10/27/2023), Disp: 18 g, Rfl: 3     azelastine (ASTELIN) 0.1 % nasal spray, Spray 1 spray into both nostrils 2 times daily as needed for rhinitis or allergies (Patient not taking: Reported on 10/27/2023), Disp: 90 mL, Rfl: 2     Cetirizine HCl (ZYRTEC PO), Take by mouth daily (Patient not taking: Reported on 10/27/2023), Disp: , Rfl:      diclofenac (VOLTAREN) 1 % topical gel, Apply 4 g topically 4 times daily as needed for moderate pain (Patient not taking: Reported on 10/27/2023), Disp: 350 g, Rfl: 1     fluticasone (FLOVENT HFA) 110 MCG/ACT inhaler, Inhale 2 puffs into the lungs 2 times daily In the Yellow Zone. (Patient not taking: Reported on 10/27/2023), Disp: 12 g, Rfl: 3     triamcinolone (KENALOG) 0.1 % external ointment, Apply sparingly to affected area twice daily as  needed not longer than 14 days in a row. Do not apply on face, neck, armpits and groin area. (Patient not taking: Reported on 10/27/2023), Disp: 80 g, Rfl: 1  Immunization History   Administered Date(s) Administered     COVID-19 12+ (2023-24) (Pfizer) 10/17/2023     COVID-19 Bivalent 18+ (Moderna) 12/19/2022     COVID-19 Monovalent 18+ (Moderna) 11/19/2021     COVID-19 Monovalent Booster 18+ (Moderna) 04/18/2022     COVID-19 Vaccine (Little) 03/05/2021     Influenza (H1N1) 01/11/2010     Influenza (IIV3) PF 11/02/1998, 12/19/2006, 10/29/2008, 08/26/2009, 10/05/2010, 01/04/2013     Influenza Vaccine 18-64 (Flublok) 10/21/2019, 11/18/2020, 10/14/2021, 10/27/2022, 10/17/2023     Influenza Vaccine >6 months (Alfuria,Fluzone) 10/23/2015, 09/21/2017, 09/06/2018     Influenza Vaccine IM Ages 6-35 Months 4 Valent (PF) 01/30/2014     Pneumococcal 23 valent 01/29/2016     TD,PF 7+ (Tenivac) 12/02/2004, 04/18/2022     TDAP Vaccine (Adacel) 11/18/2008     Td (Adult), Adsorbed 09/29/1997     Varicella 10/15/2002, 11/11/2002     Allergies   Allergen Reactions     Seasonal Allergies      Asthma and sinus issues     OBJECTIVE:                                                                 /80 (BP Location: Left arm, Patient Position: Sitting, Cuff Size: Adult Regular)   Pulse 74   Temp 97.3  F (36.3  C) (Tympanic)   Wt 103.8 kg (228 lb 12.8 oz)   SpO2 96%   BMI 33.70 kg/m          Physical Exam  Vitals and nursing note reviewed.   Constitutional:       General: He is not in acute distress.     Appearance: He is obese. He is not diaphoretic.   HENT:      Head: Normocephalic and atraumatic.      Right Ear: Tympanic membrane, ear canal and external ear normal.      Left Ear: Tympanic membrane, ear canal and external ear normal.      Nose: Septal deviation (leftward with septal spur) present. No mucosal edema or rhinorrhea.      Right Turbinates: Not enlarged, swollen or pale.      Left Turbinates: Not enlarged, swollen or  pale.      Mouth/Throat:      Lips: Pink.      Mouth: Mucous membranes are moist.      Pharynx: Oropharynx is clear. No pharyngeal swelling, oropharyngeal exudate or posterior oropharyngeal erythema.   Eyes:      General:         Right eye: No discharge.         Left eye: No discharge.      Conjunctiva/sclera: Conjunctivae normal.   Pulmonary:      Effort: Pulmonary effort is normal. No respiratory distress.      Breath sounds: Normal breath sounds and air entry. No stridor, decreased air movement or transmitted upper airway sounds. No decreased breath sounds, wheezing, rhonchi or rales.   Musculoskeletal:         General: Normal range of motion.   Neurological:      Mental Status: He is alert and oriented to person, place, and time.   Psychiatric:         Mood and Affect: Mood normal.         Behavior: Behavior normal.             WORKUP:  SPIROMETRY  initial FVC 3.98 L (87% of predicted)   initial FEV1 3.30 L (95% of predicted)  initial FEV1/FVC 83%    Asthma Control Test (ACT) total score: ACT 25    ASSESSMENT/PLAN:    Moderate persistent asthma, uncomplicated    Currently well controlled with Advair 250/50 micrograms 1 puff twice daily, montelukast 10 mg by mouth once daily, Flovent in Yellow Zone, and albuterol as needed.  - Continue montelukast, Flovent, and albuterol as needed.  - Stepdown to Advair 100/50 mcg 1 puff twice daily.  We will follow-up in 3 months after stepping down.  He knows to restart Advair 250/50, if symptoms get worse.        - BREATHING CAPACITY TEST [71725]  - ADVAIR DISKUS 100-50 MCG/ACT inhaler  Dispense: 60 each; Refill: 3    Epistaxis    - Adult ENT  Referral    Chronic rhinitis  Other chronic sinusitis  IgM deficiency (H)    IgM deficiency causing recurrent sinusitis and recurrent bronchitis.  Fairly well-controlled with azithromycin on Mondays, Wednesdays, and Fridays, budesonide irrigations once daily, saline irrigations twice daily (1 time with budesonide), and  azelastine as needed.  - Continue as is.  In Summer, he can try to stop azithromycin again for several months.      Follow-up in 3 months or sooner if needed.    Thank you for allowing us to participate in the care of this patient. Please feel free to contact us if there are any questions or concerns about the patient.    Disclaimer: This note consists of symbols derived from keyboarding, dictation and/or voice recognition software. As a result, there may be errors in the script that have gone undetected. Please consider this when interpreting information found in this chart.    Jose Angel Leiva MD, FAAAAI, FACAAI  Allergy, Asthma and Immunology     [facility]        Again, thank you for allowing me to participate in the care of your patient.        Sincerely,        Jose Angel Leiva MD

## 2023-10-27 NOTE — PROGRESS NOTES
Prior to immunization administration, verified patients identity using patient s name and date of birth. Please see Immunization Activity for additional information.     Screening Questionnaire for Adult Immunization    Are you sick today?   No   Do you have allergies to medications, food, a vaccine component or latex?   No   Have you ever had a serious reaction after receiving a vaccination?   No   Do you have a long-term health problem with heart, lung, kidney, or metabolic disease (e.g., diabetes), asthma, a blood disorder, no spleen, complement component deficiency, a cochlear implant, or a spinal fluid leak?  Are you on long-term aspirin therapy?   No   Do you have cancer, leukemia, HIV/AIDS, or any other immune system problem?   No   Do you have a parent, brother, or sister with an immune system problem?   No   In the past 3 months, have you taken medications that affect  your immune system, such as prednisone, other steroids, or anticancer drugs; drugs for the treatment of rheumatoid arthritis, Crohn s disease, or psoriasis; or have you had radiation treatments?   No   Have you had a seizure, or a brain or other nervous system problem?   No   During the past year, have you received a transfusion of blood or blood    products, or been given immune (gamma) globulin or antiviral drug?   No   For women: Are you pregnant or is there a chance you could become       pregnant during the next month?   No   Have you received any vaccinations in the past 4 weeks?   Yes     Immunization questionnaire was positive for at least one answer.  Notified Dr. Mary Lezama, patient had influenza and covid vaccine x10 days prior.    I have reviewed the following standing orders: This patient is due and qualifies for the Pneumococcal vaccine.    Click here for Pneumococcal (Adult) Standing Order    I have reviewed the vaccines inclusion and exclusion criteria;No concerns regarding eligibility.         This patient is due and  qualifies for the Zoster vaccine.    Click here for Zoster Standing Order    I have reviewed the vaccines inclusion and exclusion criteria; No concerns regarding eligibility.     Patient instructed to remain in clinic for 15 minutes afterwards, and to report any adverse reactions.     Screening performed by Sandee Serrato MA on 10/27/2023 at 9:13 AM.

## 2023-11-06 DIAGNOSIS — J45.40 MODERATE PERSISTENT ASTHMA, UNCOMPLICATED: ICD-10-CM

## 2023-11-06 NOTE — TELEPHONE ENCOUNTER
Requested Prescriptions   Pending Prescriptions Disp Refills    fluticasone-salmeterol (ADVAIR) 250-50 MCG/ACT inhaler 3 each 3     Sig: Inhale 1 puff into the lungs every 12 hours       There is no refill protocol information for this order          Last office visit: 10/27/2023 ; last virtual visit: Visit date not found with prescribing provider:  Jose Angel Leiva   Future Office Visit:   Next 5 appointments (look out 90 days)      Nov 14, 2023  8:20 AM  (Arrive by 8:00 AM)  Provider Visit with ESTHER Mccabe CNP [facility]  Arrive at: Clinic [address] [facility] [address] [phone]           Thank you,  Taryn HANSON [facility]  Specialty [facility] - [facility]

## 2023-11-07 RX ORDER — FLUTICASONE PROPIONATE AND SALMETEROL 250; 50 UG/1; UG/1
1 POWDER RESPIRATORY (INHALATION) EVERY 12 HOURS
Qty: 3 EACH | Refills: 3 | OUTPATIENT
Start: 2023-11-07

## 2023-11-07 NOTE — TELEPHONE ENCOUNTER
Too soon for refill. Last sent on 10/27/23 with 3 refills.    Delphine LU RN  Specialty/Allergy Clinics

## 2023-11-14 ENCOUNTER — OFFICE VISIT (OUTPATIENT)
Dept: FAMILY MEDICINE | Facility: CLINIC | Age: 63
End: 2023-11-14
Payer: COMMERCIAL

## 2023-11-14 VITALS
OXYGEN SATURATION: 97 % | SYSTOLIC BLOOD PRESSURE: 124 MMHG | BODY MASS INDEX: 33.33 KG/M2 | HEIGHT: 69 IN | DIASTOLIC BLOOD PRESSURE: 86 MMHG | HEART RATE: 77 BPM | TEMPERATURE: 97.9 F | WEIGHT: 225 LBS | RESPIRATION RATE: 16 BRPM

## 2023-11-14 DIAGNOSIS — E11.9 TYPE 2 DIABETES MELLITUS WITHOUT COMPLICATION, WITHOUT LONG-TERM CURRENT USE OF INSULIN (H): Primary | ICD-10-CM

## 2023-11-14 PROBLEM — R73.03 PRE-DIABETES: Status: RESOLVED | Noted: 2019-11-03 | Resolved: 2023-11-14

## 2023-11-14 LAB
CREAT UR-MCNC: 65.6 MG/DL
MICROALBUMIN UR-MCNC: <12 MG/L
MICROALBUMIN/CREAT UR: NORMAL MG/G{CREAT}

## 2023-11-14 PROCEDURE — 82043 UR ALBUMIN QUANTITATIVE: CPT | Performed by: NURSE PRACTITIONER

## 2023-11-14 PROCEDURE — 82570 ASSAY OF URINE CREATININE: CPT | Performed by: NURSE PRACTITIONER

## 2023-11-14 PROCEDURE — 99214 OFFICE O/P EST MOD 30 MIN: CPT | Performed by: NURSE PRACTITIONER

## 2023-11-14 RX ORDER — METFORMIN HCL 500 MG
500 TABLET, EXTENDED RELEASE 24 HR ORAL
Qty: 90 TABLET | Refills: 3 | Status: SHIPPED | OUTPATIENT
Start: 2023-11-14

## 2023-11-14 ASSESSMENT — PAIN SCALES - GENERAL: PAINLEVEL: NO PAIN (0)

## 2023-11-14 NOTE — LETTER
November 15, 2023      Markell Ramsey  [address] [address]        Dear ,    We are writing to inform you of your test results.    Urine is negative for protein.   We will recheck in 1 year.     Resulted Orders   Albumin Random Urine Quantitative with Creat Ratio   Result Value Ref Range    Creatinine Urine mg/dL 65.6 mg/dL      Comment:      The reference ranges have not been established in urine creatinine. The results should be integrated into the clinical context for interpretation.    Albumin Urine mg/L <12.0 mg/L      Comment:      The reference ranges have not been established in urine albumin. The results should be integrated into the clinical context for interpretation.    Albumin Urine mg/g Cr        Comment:      Unable to calculate, urine albumin and/or urine creatinine is outside detectable limits.  Microalbuminuria is defined as an albumin:creatinine ratio of 17 to 299 for males and 25 to 299 for females. A ratio of albumin:creatinine of 300 or higher is indicative of overt proteinuria.  Due to biologic variability, positive results should be confirmed by a second, first-morning random or 24-hour timed urine specimen. If there is discrepancy, a third specimen is recommended. When 2 out of 3 results are in the microalbuminuria range, this is evidence for incipient nephropathy and warrants increased efforts at glucose control, blood pressure control, and institution of therapy with an angiotensin-converting-enzyme (ACE) inhibitor (if the patient can tolerate it).         If you have any questions or concerns, please call the clinic at the number listed above.       Sincerely,      ESTHER Mccabe CNP

## 2023-11-14 NOTE — PROGRESS NOTES
Assessment & Plan     Type 2 diabetes mellitus without complication, without long-term current use of insulin (H)  New diagnosis - discussed.  Recommend diabetes ed  Needs diabetic eye exam  Urine albumin today.  BPs well controlled - on an ACEi  Already taking a statin.    Start metformin one tablet with dinner.  Follow up in 3 months.    - Albumin Random Urine Quantitative with Creat Ratio  - metFORMIN (GLUCOPHAGE XR) 500 MG 24 hr tablet; Take 1 tablet (500 mg) by mouth daily (with dinner)  - [facility] Adult Diabetes Educator Referral; Future    The risks, benefits and treatment options of prescribed medications or other treatments have been discussed with the patient. The patient verbalized their understanding and should call or follow up if no improvement or if they develop further problems.  [facility]          David Guillaume is a 63 year old, presenting for the following health issues:  Diabetes (Pt was previously seeing dr. Jin, would like to discuss new diagnosis of diabetes. )        11/14/2023     7:54 AM   Additional Questions   Roomed by Juana AMBRIZ   Accompanied by self         11/14/2023     7:54 AM   Patient Reported Additional Medications   Patient reports taking the following new medications no new meds       History of Present Illness       Diabetes:   He presents for follow up of diabetes.    He is not checking blood glucose.        He is concerned about other.   He is having excessive thirst.  The patient has not had a diabetic eye exam in the last 12 months.          He eats 2-3 servings of fruits and vegetables daily.He consumes 1 sweetened beverage(s) daily.He exercises with enough effort to increase his heart rate 10 to 19 minutes per day.  He exercises with enough effort to increase his heart rate 3 or less days per week.   He is taking medications regularly.     Previously diagnosed with pre-diabetes  This is the first time his A1c has been  "6.5%                Review of Systems   Constitutional, HEENT, cardiovascular, pulmonary, gi and gu systems are negative, except as otherwise noted.      Objective    /86   Pulse 77   Temp 97.9  F (36.6  C) (Tympanic)   Resp 16   Ht 1.753 m (5' 9\")   Wt 102.1 kg (225 lb)   SpO2 97%   BMI 33.23 kg/m    Body mass index is 33.23 kg/m .  Physical Exam   GENERAL: healthy, alert and no distress  PSYCH: mentation appears normal, affect normal/bright    Office Visit on 10/17/2023   Component Date Value Ref Range Status    Sodium 10/17/2023 137  135 - 145 mmol/L Final    Reference intervals for this test were updated on 09/26/2023 to more accurately reflect our healthy population. There may be differences in the flagging of prior results with similar values performed with this method. Interpretation of those prior results can be made in the context of the updated reference intervals.     Potassium 10/17/2023 4.0  3.4 - 5.3 mmol/L Final    Chloride 10/17/2023 99  98 - 107 mmol/L Final    Carbon Dioxide (CO2) 10/17/2023 30 (H)  22 - 29 mmol/L Final    Anion Gap 10/17/2023 8  7 - 15 mmol/L Final    Urea Nitrogen 10/17/2023 14.2  8.0 - 23.0 mg/dL Final    Creatinine 10/17/2023 1.15  0.67 - 1.17 mg/dL Final    GFR Estimate 10/17/2023 72  >60 mL/min/1.73m2 Final    Calcium 10/17/2023 9.8  8.8 - 10.2 mg/dL Final    Glucose 10/17/2023 118 (H)  70 - 99 mg/dL Final    Cholesterol 10/17/2023 186  <200 mg/dL Final    Triglycerides 10/17/2023 213 (H)  <150 mg/dL Final    Direct Measure HDL 10/17/2023 44  >=40 mg/dL Final    LDL Cholesterol Calculated 10/17/2023 99  <=100 mg/dL Final    Non HDL Cholesterol 10/17/2023 142 (H)  <130 mg/dL Final    Hemoglobin A1C 10/17/2023 6.5 (H)  0.0 - 5.6 % Final    Normal <5.7%   Prediabetes 5.7-6.4%    Diabetes 6.5% or higher     Note: Adopted from ADA consensus guidelines.    WBC Count 10/17/2023 9.7  4.0 - 11.0 10e3/uL Final    RBC Count 10/17/2023 5.25  4.40 - 5.90 10e6/uL Final    " Hemoglobin 10/17/2023 15.5  13.3 - 17.7 g/dL Final    Hematocrit 10/17/2023 45.7  40.0 - 53.0 % Final    MCV 10/17/2023 87  78 - 100 fL Final    MCH 10/17/2023 29.5  26.5 - 33.0 pg Final    MCHC 10/17/2023 33.9  31.5 - 36.5 g/dL Final    RDW 10/17/2023 12.0  10.0 - 15.0 % Final    Platelet Count 10/17/2023 324  150 - 450 10e3/uL Final    % Neutrophils 10/17/2023 68  % Final    % Lymphocytes 10/17/2023 20  % Final    % Monocytes 10/17/2023 9  % Final    % Eosinophils 10/17/2023 2  % Final    % Basophils 10/17/2023 1  % Final    % Immature Granulocytes 10/17/2023 1  % Final    Absolute Neutrophils 10/17/2023 6.6  1.6 - 8.3 10e3/uL Final    Absolute Lymphocytes 10/17/2023 2.0  0.8 - 5.3 10e3/uL Final    Absolute Monocytes 10/17/2023 0.8  0.0 - 1.3 10e3/uL Final    Absolute Eosinophils 10/17/2023 0.2  0.0 - 0.7 10e3/uL Final    Absolute Basophils 10/17/2023 0.1  0.0 - 0.2 10e3/uL Final    Absolute Immature Granulocytes 10/17/2023 0.1  <=0.4 10e3/uL Final

## 2023-11-14 NOTE — PATIENT INSTRUCTIONS
Schedule diabetes ed - someone will call you.    Schedule diabetes eye exam.    Follow up with me again in 3 months.

## 2023-11-27 ENCOUNTER — TELEPHONE (OUTPATIENT)
Dept: FAMILY MEDICINE | Facility: CLINIC | Age: 63
End: 2023-11-27
Payer: COMMERCIAL

## 2023-11-27 NOTE — TELEPHONE ENCOUNTER
Patient Quality Outreach    Patient is due for the following:   Colon Cancer Screening    Next Steps:   No follow up needed at this time.    Type of outreach:    Sent letter.    Next Steps:  Reach out within 90 days via [employer].    Max number of attempts reached: Yes. Will try again in 90 days if patient still on fail list.    Questions for provider review:    None           Kristin CARDENAS LPN

## 2023-11-27 NOTE — LETTER
November 27, 2023      Markell Ramsey  [address] [address]    Your team at [facility] cares about your health. We have reviewed your chart and based on our findings; we are making the following recommendations to better manage your health.     You are in particular need of attention regarding the following:     Call or MyChart message your clinic to schedule a colonoscopy, schedule/ a FIT Test, or order a Cologuard test. If you are unsure what type of test you need, please call your clinic and speak to clinic staff.   Colon cancer is now the second leading cause of cancer-related deaths in the [facility] for both men and women and there are over 130,000 new cases and 50,000 deaths per year from colon cancer. Colonoscopies can prevent 90-95% of these deaths. Problem lesions can be removed before they ever become cancer. This test is not only looking for cancer, but also getting rid of precancerous lesions.   If you are under/uninsured, we recommend you contact the [employer] Program.[employer] is a free colorectal cancer screening program that provides colonoscopies for eligible under/uninsured [place] men and women. If you are interested in receiving a free colonoscopy, please call [employer] at t [phone] (mention code [employer]) to see if you're eligible. Please have them send us the results.     If you have already completed these items, please contact the clinic via phone or   [employer] so your care team can review and update your records. Thank you for   choosing [facility] for your healthcare needs. For any questions,   concerns, or to schedule an appointment please contact our clinic at [phone].    Healthy Regards,      Your [facility] Care Team

## 2023-12-11 DIAGNOSIS — J31.0 CHRONIC RHINITIS: ICD-10-CM

## 2023-12-11 NOTE — TELEPHONE ENCOUNTER
Last office visit: 10/27/2023  Next office visit: 01/23/2024  Last filled: 10/27/2022    Ibis Quintero MA, RMA  12/11/2023 1:08 PM

## 2023-12-12 RX ORDER — AZELASTINE 1 MG/ML
1 SPRAY, METERED NASAL 2 TIMES DAILY PRN
Qty: 90 ML | Refills: 2 | Status: SHIPPED | OUTPATIENT
Start: 2023-12-12 | End: 2024-07-26

## 2023-12-12 NOTE — TELEPHONE ENCOUNTER
Prescription approved per [facility] Refill Protocol.    Delphine LU RN  Specialty/Allergy Clinics

## 2024-01-04 ENCOUNTER — TELEPHONE (OUTPATIENT)
Dept: ALLERGY | Facility: CLINIC | Age: 64
End: 2024-01-04
Payer: COMMERCIAL

## 2024-01-04 DIAGNOSIS — J45.40 MODERATE PERSISTENT ASTHMA, UNCOMPLICATED: Primary | ICD-10-CM

## 2024-01-04 RX ORDER — FLUTICASONE PROPIONATE AND SALMETEROL 100; 50 UG/1; UG/1
1 POWDER RESPIRATORY (INHALATION) EVERY 12 HOURS
Qty: 60 EACH | Refills: 3 | Status: CANCELLED | OUTPATIENT
Start: 2024-01-04

## 2024-01-04 RX ORDER — FLUTICASONE PROPIONATE AND SALMETEROL 100; 50 UG/1; UG/1
1 POWDER RESPIRATORY (INHALATION) EVERY 12 HOURS
Qty: 60 EACH | Refills: 3 | Status: SHIPPED | OUTPATIENT
Start: 2024-01-04 | End: 2024-01-08

## 2024-01-04 NOTE — TELEPHONE ENCOUNTER
Alternative Requested  ADVAIR DISKUS 100-50 MCG/ACT inhaler    Pharmacy comments:  Alternative requested: Can we get a new order for the Wixela which is generic for advair per ins. This order has a dispense as written for brand. Thank you.  Suggested Alternative: Breo Ellipta 50-25 MCG inhaler, [facility]  Phone[facility] [phone]  RX: [phone]    Thank you,  Taryn HANSON [facility]  Specialty [facility]

## 2024-01-08 RX ORDER — FLUTICASONE PROPIONATE AND SALMETEROL 100; 50 UG/1; UG/1
1 POWDER RESPIRATORY (INHALATION) EVERY 12 HOURS
Qty: 180 EACH | Refills: 0 | Status: SHIPPED | OUTPATIENT
Start: 2024-01-08 | End: 2024-01-23

## 2024-01-08 NOTE — TELEPHONE ENCOUNTER
Resent as 90 day rx with no refills.    Cailin Garcia MSN, RN   Specialty Clinic, 1/8/2024 9:59 AM

## 2024-01-08 NOTE — TELEPHONE ENCOUNTER
90 Day Supply Requested  Pharmacy Comment: Need 90 day RX for INS- requires 90 day    Thank you,  Taryn HANSON [facility]  Specialty [facility]

## 2024-01-22 NOTE — PROGRESS NOTES
SUBJECTIVE:                                                                   Markell Ramsey presents today to our Allergy Clinic at [facility] for a follow up visit. He is a 63 year old male with  moderate persistent asthma, allergic rhinitis, recurrent sinus infection/bronchitis and decreased IgM.     November 2017:Normal CH 50. Normal total IgE.  Normal total IgG.  Normal IgG subclasses.   Normal total IgA level. Decreased IgM level on multiple occasions. Normal T and B cells (flow cytometry).  20/22 protective pneumococcal antibody levels. Protective tetanus antibody level.      SPT in 2007 was positive to molds, tree pollen, grass mix, and ragweed. IDs were positive for cat, roaches, dog, mite. Aspergillus mold, and marsh elder. Started by Dr. Whittington on allergen IT in 2007,  [facility] Whittington used to practice before. He received allergen immunotherapy on and off until 2010, which he found partially helpful.   Sinus CT in 2013, with mild-moderate sinus disease, and DNS on the left. The patient confirms nasal congestion L>R.  IMPRESSION:  Mild/moderate mucosal thickening in the paranasal sinuses as described above.  Slight worsening of the right maxillary sinus and worsening of the right frontal sinus since the previous exam, but clearing of the left sphenoid sinus.  In October 2023, we stepdown on his asthma controller from Advair 250/50 micrograms to fluticasone-salmeterol 100/50 mcg 1 puff twice daily.  He continues taking montelukast 10 mg by mouth once daily.  Flovent has been used in [place].  He used it for 3 days around [place] when he got sick with respiratory infection.  Otherwise, Markell uses albuterol HFA  less than twice weekly for rescue from chest symptoms. he wakes up less than twice per month due to chest symptoms. There has been no use of oral steroids since the last visit. No ED/PCP/urgent care/other specialist visits for asthma flare  since the previous visit. The patient denies current chest tightness, cough, wheezing, or shortness of breath.   Asthma triggers remain unchanged. He uses a spacer/chamber device, where applicable.  He continues taking azithromycin on Mondays, Wednesdays, and Fridays.    He uses budesonide 0.25mg/2ml once daily and saline rinses once daily.  He increases the dose to twice daily when he gets sick with respiratory infection.  He rarely needs azelastine.  Markell does not take any oral antihistamines.      Patient Active Problem List   Diagnosis    Gastroesophageal reflux disease without esophagitis    Non morbid obesity due to excess calories    IgM deficiency (H)    Moderate persistent asthma, uncomplicated    Essential hypertension    Hyperlipidemia LDL goal <130    DNS (deviated nasal septum)    Other chronic rhinitis    Arthritis of right knee    Type 2 diabetes mellitus without complication, without long-term current use of insulin (H)       Past Medical History:   Diagnosis Date    Acute bronchitis     Allergic rhinitis, cause unspecified 09/23/2008    Allergic rhinitis, unspecified allergic rhinitis type 09/23/2008     Problem list name updated by automated process. Provider to review    Arthritis of right knee 10/17/2023    Diabetes mellitus, type 2 (H) 10/27/2023    Esophageal reflux     Hypertension 2014    Pneumonia 02/24/2013    MRSA, required hospitalization    Pre-diabetes     Uncomplicated asthma 1972    Unspecified asthma(493.90)       Problem (# of Occurrences) Relation (Name,Age of Onset)    Allergies (1) Mother: asthma    Asthma (1) Daughter (Shirin)    Cancer (1) Father (Ruslan): lung    Respiratory (3) Sister: asthma, Son (Ludante): Upper Resp infections, Son (David): asthma    Other Cancer (1) Father (Ruslan): Braulio          Past Surgical History:   Procedure Laterality Date    BACK SURGERY  2006    L4 / L5 Discectomy    SURGICAL HISTORY OF -   02/23/2006    L4-5 microdiscectomy    TONSILLECTOMY &  ADENOIDECTOMY      ZZC APPENDECTOMY  08/01/2006     Social History     Socioeconomic History    Marital status:      Spouse name: None    Number of children: None    Years of education: None    Highest education level: None   Occupational History     Employer: [employer]   Tobacco Use    Smoking status: Never    Smokeless tobacco: Never   Vaping Use    Vaping Use: Never used   Substance and Sexual Activity    Alcohol use: Yes     Comment: rare    Drug use: No    Sexual activity: Yes     Partners: Female   Other Topics Concern    Parent/sibling w/ CABG, MI or angioplasty before 65F 55M? No   Social History Narrative    01/23/24        ENVIRONMENTAL HISTORY: [place] The home is heated with a forced air and gas furnace. They does have central air conditioning. The patient's bedroom is furnished with hard naseem in bedroom, allergen mattress cover and allergen pillowcase cover.  Has 2 rabbit  inside the house since Summer 2020. There is no history of cockroach or mice infestation. There are no smokers in the house.  The house does not have a damp basement.              Social Determinants of Health     Financial Resource Strain: Low Risk  (10/16/2023)    Financial Resource Strain     Within the past 12 months, have you or your family members you live with been unable to get utilities (heat, electricity) when it was really needed?: No   Food Insecurity: Low Risk  (10/16/2023)    Food Insecurity     Within the past 12 months, did you worry that your food would run out before you got money to buy more?: No     Within the past 12 months, did the food you bought just not last and you didn t have money to get more?: No   Transportation Needs: Low Risk  (10/16/2023)    Transportation Needs     Within the past 12 months, has lack of transportation kept you from medical appointments, getting your medicines, non-medical meetings or appointments, work, or from getting things  that you need?: No   Interpersonal Safety: Low Risk  (10/17/2023)    Interpersonal Safety     Do you feel physically and emotionally safe where you currently live?: Yes     Within the past 12 months, have you been hit, slapped, kicked or otherwise physically hurt by someone?: No     Within the past 12 months, have you been humiliated or emotionally abused in other ways by your partner or ex-partner?: No   Housing Stability: Low Risk  (10/16/2023)    Housing Stability     Do you have housing? : Yes     Are you worried about losing your housing?: No           Review of Systems   Constitutional:  Negative for activity change, chills, fatigue and fever.   HENT:  Negative for congestion, ear pain, facial swelling, nosebleeds, postnasal drip, rhinorrhea, sinus pressure, sneezing and sore throat.    Eyes:  Negative for discharge, redness and itching.   Respiratory:  Negative for cough, chest tightness, shortness of breath and wheezing.    Cardiovascular:  Negative for chest pain.   Gastrointestinal:  Negative for abdominal pain, constipation, diarrhea, nausea and vomiting.   Skin:  Negative for rash.   Neurological:  Negative for headaches.           Current Outpatient Medications:     albuterol (VENTOLIN HFA) 108 (90 Base) MCG/ACT inhaler, Inhale 2-4 puffs into the lungs every 4 hours as needed for shortness of breath / dyspnea Take before exercise., Disp: 18 g, Rfl: 3    atorvastatin (LIPITOR) 10 MG tablet, Take 1 tablet (10 mg) by mouth daily, Disp: 90 tablet, Rfl: 3    azelastine (ASTELIN) 0.1 % nasal spray, Spray 1 spray into both nostrils 2 times daily as needed for rhinitis or allergies, Disp: 90 mL, Rfl: 2    azithromycin (ZITHROMAX) 250 MG tablet, Take one tablet by mouth once daily on Mondays, Wednesdays and Fridays., Disp: 36 tablet, Rfl: 3    beclomethasone HFA (QVAR REDIHALER) 80 MCG/ACT inhaler, Inhale 2 puffs into the lungs 2 times daily With bronchitis episodes, Disp: 10.6 g, Rfl: 3    budesonide  (PULMICORT) 0.25 MG/2ML neb solution, Mix respule of 0.25mg/2 mL budesonide vial in 8oz normal saline sinus rinse bottle. Irrigate each nostril with half of the bottle once daily., Disp: 360 mL, Rfl: 1    diclofenac (VOLTAREN) 1 % topical gel, Apply 4 g topically 4 times daily as needed for moderate pain, Disp: 350 g, Rfl: 1    ESOMEPRAZOLE MAGNESIUM PO, Take 20 mg by mouth every morning (before breakfast), Disp: , Rfl:     fluticasone (FLOVENT HFA) 110 MCG/ACT inhaler, Inhale 2 puffs into the lungs 2 times daily In the [place]., Disp: 12 g, Rfl: 3    ipratropium - albuterol 0.5 mg/2.5 mg/3 mL (DUONEB) 0.5-2.5 (3) MG/3ML neb solution, Take 1 vial (3 mLs) by nebulization every 6 hours as needed for shortness of breath / dyspnea or wheezing, Disp: 30 vial, Rfl: 1    lisinopril-hydrochlorothiazide (ZESTORETIC) 10-12.5 MG tablet, Take 1 tablet by mouth daily, Disp: 90 tablet, Rfl: 3    metFORMIN (GLUCOPHAGE XR) 500 MG 24 hr tablet, Take 1 tablet (500 mg) by mouth daily (with dinner), Disp: 90 tablet, Rfl: 3    montelukast (SINGULAIR) 10 MG tablet, Take 1 tablet (10 mg) by mouth at bedtime, Disp: 90 tablet, Rfl: 3    Respiratory Therapy Supplies (NEBULIZER/TUBING/MOUTHPIECE) KIT, use with nebulizer, Disp: 1 kit, Rfl: 11    triamcinolone (KENALOG) 0.1 % external ointment, Apply sparingly to affected area twice daily as needed not longer than 14 days in a row. Do not apply on face, neck, armpits and groin area., Disp: 80 g, Rfl: 1    ADVAIR DISKUS 100-50 MCG/ACT inhaler, Inhale 1 puff into the lungs every 12 hours, Disp: 60 each, Rfl: 3    fluticasone-salmeterol (WIXELA INHUB) 100-50 MCG/ACT inhaler, Inhale 1 puff into the lungs every 12 hours (Patient not taking: Reported on 1/23/2024), Disp: 180 each, Rfl: 0  Immunization History   Administered Date(s) Administered    COVID-19 12+ (2023-24) (Pfizer) 10/17/2023    COVID-19 Bivalent 18+ (Moderna) 12/19/2022    COVID-19 Monovalent 18+ (Moderna) 11/19/2021    COVID-19  Monovalent Booster 18+ (Moderna) 04/18/2022    COVID-19 Vaccine (Little) 03/05/2021    Influenza (H1N1) 01/11/2010    Influenza (IIV3) PF 11/02/1998, 12/19/2006, 10/29/2008, 08/26/2009, 10/05/2010, 01/04/2013    Influenza Vaccine 18-64 (Flublok) 10/21/2019, 11/18/2020, 10/14/2021, 10/27/2022, 10/17/2023    Influenza Vaccine >6 months,quad, PF 10/23/2015, 09/21/2017, 09/06/2018    Influenza Vaccine IM Ages 6-35 Months 4 Valent (PF) 01/30/2014    Pneumococcal 20 valent Conjugate (Prevnar 20) 10/27/2023    Pneumococcal 23 valent 01/29/2016    TD,PF 7+ (Tenivac) 12/02/2004, 04/18/2022    TDAP Vaccine (Adacel) 11/18/2008    Td (Adult), Adsorbed 09/29/1997    Varicella 10/15/2002, 11/11/2002    Zoster recombinant adjuvanted (SHINGRIX) 10/27/2023     Allergies   Allergen Reactions    Seasonal Allergies      Asthma and sinus issues     OBJECTIVE:                                                                 /69   Pulse 78   SpO2 98%         Physical Exam  Vitals and nursing note reviewed.   Constitutional:       General: He is not in acute distress.     Appearance: He is obese. He is not diaphoretic.   HENT:      Head: Normocephalic and atraumatic.      Right Ear: Tympanic membrane, ear canal and external ear normal.      Left Ear: Tympanic membrane, ear canal and external ear normal.      Nose: Septal deviation (leftward with septal spur) present. No mucosal edema or rhinorrhea.      Right Turbinates: Not enlarged, swollen or pale.      Left Turbinates: Not enlarged, swollen or pale.      Mouth/Throat:      Lips: Pink.      Mouth: Mucous membranes are moist.      Pharynx: Oropharynx is clear. No pharyngeal swelling, oropharyngeal exudate or posterior oropharyngeal erythema.   Eyes:      General:         Right eye: No discharge.         Left eye: No discharge.      Conjunctiva/sclera: Conjunctivae normal.   Pulmonary:      Effort: Pulmonary effort is normal. No respiratory distress.      Breath sounds: Normal  breath sounds and air entry. No stridor, decreased air movement or transmitted upper airway sounds. No decreased breath sounds, wheezing, rhonchi or rales.   Neurological:      Mental Status: He is alert and oriented to person, place, and time.   Psychiatric:         Mood and Affect: Mood normal.         Behavior: Behavior normal.         WORKUP:     SPIROMETRY       FVC 4.45 L (97% of predicted).     FEV1 3.51 L (102% of predicted).     FEV1/FVC 79%      I have reviewed and interpreted these results. My interpretation:The office spirometry performed today doesn't suggest an obstruction.     Asthma Control Test (ACT) total score: 25     ASSESSMENT/PLAN:    Moderate persistent asthma, uncomplicated  Asthma is well-controlled.  Successfully stepdown to fluticasone-salmeterol 100/50 mcg 1 puff twice daily.  - Continue as is.  - Continue montelukast 10 mg by mouth once daily.  - Continue azithromycin on Mondays, Wednesdays, and Fridays.  - Continue albuterol 2 to 4 puffs every 4 hours as needed for persistent cough/chest tightness/wheezing/shortness of breath.  - Add Qvar 80 mcg 2 puffs twice daily when he gets sick with bronchitis.  He used to use Flovent, but it has been discontinued.    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 Fairly well-controlled with azithromycin on Mondays, Wednesdays, and Fridays, budesonide irrigations once daily, saline irrigations twice daily (1 time with budesonide), and azelastine as needed.  - Continue as is.  In Summer, he can try to stop azithromycin again for several months.     Follow-up in 3 months or sooner if needed.     Thank you for allowing us to participate in the care of this patient. Please feel free to contact us if there are any questions or concerns about the  patient.    Disclaimer: This note consists of symbols derived from keyboarding, dictation and/or voice recognition software. As a result, there may be errors in the script that have gone undetected. Please consider this when interpreting information found in this chart.    Jose Angel Leiva MD, FAAAAI, FACAAI  Allergy, Asthma and Immunology     [facility]

## 2024-01-23 ENCOUNTER — TRANSFERRED RECORDS (OUTPATIENT)
Dept: HEALTH INFORMATION MANAGEMENT | Facility: CLINIC | Age: 64
End: 2024-01-23

## 2024-01-23 ENCOUNTER — OFFICE VISIT (OUTPATIENT)
Dept: ALLERGY | Facility: OTHER | Age: 64
End: 2024-01-23
Payer: COMMERCIAL

## 2024-01-23 VITALS — OXYGEN SATURATION: 98 % | SYSTOLIC BLOOD PRESSURE: 136 MMHG | DIASTOLIC BLOOD PRESSURE: 69 MMHG | HEART RATE: 78 BPM

## 2024-01-23 DIAGNOSIS — J31.0 CHRONIC RHINITIS: ICD-10-CM

## 2024-01-23 DIAGNOSIS — J45.40 MODERATE PERSISTENT ASTHMA, UNCOMPLICATED: Primary | ICD-10-CM

## 2024-01-23 DIAGNOSIS — J32.8 OTHER CHRONIC SINUSITIS: ICD-10-CM

## 2024-01-23 DIAGNOSIS — D80.4 IGM DEFICIENCY (H): ICD-10-CM

## 2024-01-23 LAB
FEF 25/75: NORMAL
FEV-1: NORMAL
FEV1/FVC: NORMAL
FVC: NORMAL

## 2024-01-23 PROCEDURE — 94010 BREATHING CAPACITY TEST: CPT | Performed by: ALLERGY & IMMUNOLOGY

## 2024-01-23 PROCEDURE — 99214 OFFICE O/P EST MOD 30 MIN: CPT | Mod: 25 | Performed by: ALLERGY & IMMUNOLOGY

## 2024-01-23 RX ORDER — FLUTICASONE PROPIONATE AND SALMETEROL 100; 50 UG/1; UG/1
1 POWDER RESPIRATORY (INHALATION) EVERY 12 HOURS
Qty: 180 EACH | Refills: 3 | Status: SHIPPED | OUTPATIENT
Start: 2024-01-23 | End: 2024-07-26

## 2024-01-23 RX ORDER — BECLOMETHASONE DIPROPIONATE HFA 80 UG/1
2 AEROSOL, METERED RESPIRATORY (INHALATION) 2 TIMES DAILY
Qty: 10.6 G | Refills: 3 | Status: SHIPPED | OUTPATIENT
Start: 2024-01-23 | End: 2024-04-15

## 2024-01-23 ASSESSMENT — ENCOUNTER SYMPTOMS
SINUS PRESSURE: 0
CHILLS: 0
VOMITING: 0
SORE THROAT: 0
SHORTNESS OF BREATH: 0
RHINORRHEA: 0
ACTIVITY CHANGE: 0
ABDOMINAL PAIN: 0
HEADACHES: 0
WHEEZING: 0
FACIAL SWELLING: 0
NAUSEA: 0
EYE DISCHARGE: 0
COUGH: 0
DIARRHEA: 0
CHEST TIGHTNESS: 0
EYE REDNESS: 0
FATIGUE: 0
FEVER: 0
CONSTIPATION: 0
EYE ITCHING: 0

## 2024-01-23 ASSESSMENT — ASTHMA QUESTIONNAIRES: ACT_TOTALSCORE: 25

## 2024-01-23 NOTE — LETTER
1/23/2024         RE: Markell Ramsey  [address] [facility] [address]        Dear Colleague,    Thank you for referring your patient, Markell Ramsey, to the [facility]. Please see a copy of my visit note below.    SUBJECTIVE:                                                                   Markell Ramsey presents today to our Allergy Clinic at [facility] for a follow up visit. He is a 63 year old male with  moderate persistent asthma, allergic rhinitis, recurrent sinus infection/bronchitis and decreased IgM.     November 2017:Normal CH 50. Normal total IgE.  Normal total IgG.  Normal IgG subclasses.   Normal total IgA level. Decreased IgM level on multiple occasions. Normal T and B cells (flow cytometry).  20/22 protective pneumococcal antibody levels. Protective tetanus antibody level.      SPT in 2007 was positive to molds, tree pollen, grass mix, and ragweed. IDs were positive for cat, roaches, dog, mite. Aspergillus mold, and marsh elder. Started by Dr. Whittington on allergen IT in 2007,  [facility] Whittington used to practice before. He received allergen immunotherapy on and off until 2010, which he found partially helpful.   Sinus CT in 2013, with mild-moderate sinus disease, and DNS on the left. The patient confirms nasal congestion L>R.  IMPRESSION:  Mild/moderate mucosal thickening in the paranasal sinuses as described above.  Slight worsening of the right maxillary sinus and worsening of the right frontal sinus since the previous exam, but clearing of the left sphenoid sinus.  In October 2023, we stepdown on his asthma controller from Advair 250/50 micrograms to fluticasone-salmeterol 100/50 mcg 1 puff twice daily.  He continues taking montelukast 10 mg by mouth once daily.  Flovent has been used in [place].  He used it for 3 days around [place] when he got sick with respiratory infection.  Otherwise, Markell uses albuterol  HFA  less than twice weekly for rescue from chest symptoms. he wakes up less than twice per month due to chest symptoms. There has been no use of oral steroids since the last visit. No ED/PCP/urgent care/other specialist visits for asthma flare since the previous visit. The patient denies current chest tightness, cough, wheezing, or shortness of breath.   Asthma triggers remain unchanged. He uses a spacer/chamber device, where applicable.  He continues taking azithromycin on Mondays, Wednesdays, and Fridays.    He uses budesonide 0.25mg/2ml once daily and saline rinses once daily.  He increases the dose to twice daily when he gets sick with respiratory infection.  He rarely needs azelastine.  Markell does not take any oral antihistamines.      Patient Active Problem List   Diagnosis     Gastroesophageal reflux disease without esophagitis     Non morbid obesity due to excess calories     IgM deficiency (H)     Moderate persistent asthma, uncomplicated     Essential hypertension     Hyperlipidemia LDL goal <130     DNS (deviated nasal septum)     Other chronic rhinitis     Arthritis of right knee     Type 2 diabetes mellitus without complication, without long-term current use of insulin (H)       Past Medical History:   Diagnosis Date     Acute bronchitis      Allergic rhinitis, cause unspecified 09/23/2008     Allergic rhinitis, unspecified allergic rhinitis type 09/23/2008     Problem list name updated by automated process. Provider to review     Arthritis of right knee 10/17/2023     Diabetes mellitus, type 2 (H) 10/27/2023     Esophageal reflux      Hypertension 2014     Pneumonia 02/24/2013    MRSA, required hospitalization     Pre-diabetes      Uncomplicated asthma 1972     Unspecified asthma(493.90)       Problem (# of Occurrences) Relation (Name,Age of Onset)    Allergies (1) Mother: asthma    Asthma (1) Daughter (Shirin)    Cancer (1) Father (Ruslan): lung    Respiratory (3) Sister: asthma, Son (Wisam): Upper Resp  infections, Son (David): asthma    Other Cancer (1) Father (Ruslan): Braulio          Past Surgical History:   Procedure Laterality Date     BACK SURGERY  2006    L4 / L5 Discectomy     SURGICAL HISTORY OF -   02/23/2006    L4-5 microdiscectomy     TONSILLECTOMY & ADENOIDECTOMY       ZZC APPENDECTOMY  08/01/2006     Social History     Socioeconomic History     Marital status:      Spouse name: None     Number of children: None     Years of education: None     Highest education level: None   Occupational History     Employer: [employer]   Tobacco Use     Smoking status: Never     Smokeless tobacco: Never   Vaping Use     Vaping Use: Never used   Substance and Sexual Activity     Alcohol use: Yes     Comment: rare     Drug use: No     Sexual activity: Yes     Partners: Female   Other Topics Concern     Parent/sibling w/ CABG, MI or angioplasty before 65F 55M? No   Social History Narrative    01/23/24        ENVIRONMENTAL HISTORY: [place] The home is heated with a forced air and gas furnace. They does have central air conditioning. The patient's bedroom is furnished with hard naseem in bedroom, allergen mattress cover and allergen pillowcase cover.  Has 2 rabbit  inside the house since Summer 2020. There is no history of cockroach or mice infestation. There are no smokers in the house.  The house does not have a damp basement.              Social Determinants of Health     Financial Resource Strain: Low Risk  (10/16/2023)    Financial Resource Strain      Within the past 12 months, have you or your family members you live with been unable to get utilities (heat, electricity) when it was really needed?: No   Food Insecurity: Low Risk  (10/16/2023)    Food Insecurity      Within the past 12 months, did you worry that your food would run out before you got money to buy more?: No      Within the past 12 months, did the food you bought just not last and you didn t have  money to get more?: No   Transportation Needs: Low Risk  (10/16/2023)    Transportation Needs      Within the past 12 months, has lack of transportation kept you from medical appointments, getting your medicines, non-medical meetings or appointments, work, or from getting things that you need?: No   Interpersonal Safety: Low Risk  (10/17/2023)    Interpersonal Safety      Do you feel physically and emotionally safe where you currently live?: Yes      Within the past 12 months, have you been hit, slapped, kicked or otherwise physically hurt by someone?: No      Within the past 12 months, have you been humiliated or emotionally abused in other ways by your partner or ex-partner?: No   Housing Stability: Low Risk  (10/16/2023)    Housing Stability      Do you have housing? : Yes      Are you worried about losing your housing?: No           Review of Systems   Constitutional:  Negative for activity change, chills, fatigue and fever.   HENT:  Negative for congestion, ear pain, facial swelling, nosebleeds, postnasal drip, rhinorrhea, sinus pressure, sneezing and sore throat.    Eyes:  Negative for discharge, redness and itching.   Respiratory:  Negative for cough, chest tightness, shortness of breath and wheezing.    Cardiovascular:  Negative for chest pain.   Gastrointestinal:  Negative for abdominal pain, constipation, diarrhea, nausea and vomiting.   Skin:  Negative for rash.   Neurological:  Negative for headaches.           Current Outpatient Medications:      albuterol (VENTOLIN HFA) 108 (90 Base) MCG/ACT inhaler, Inhale 2-4 puffs into the lungs every 4 hours as needed for shortness of breath / dyspnea Take before exercise., Disp: 18 g, Rfl: 3     atorvastatin (LIPITOR) 10 MG tablet, Take 1 tablet (10 mg) by mouth daily, Disp: 90 tablet, Rfl: 3     azelastine (ASTELIN) 0.1 % nasal spray, Spray 1 spray into both nostrils 2 times daily as needed for rhinitis or allergies, Disp: 90 mL, Rfl: 2     azithromycin  (ZITHROMAX) 250 MG tablet, Take one tablet by mouth once daily on Mondays, Wednesdays and Fridays., Disp: 36 tablet, Rfl: 3     beclomethasone HFA (QVAR REDIHALER) 80 MCG/ACT inhaler, Inhale 2 puffs into the lungs 2 times daily With bronchitis episodes, Disp: 10.6 g, Rfl: 3     budesonide (PULMICORT) 0.25 MG/2ML neb solution, Mix respule of 0.25mg/2 mL budesonide vial in 8oz normal saline sinus rinse bottle. Irrigate each nostril with half of the bottle once daily., Disp: 360 mL, Rfl: 1     diclofenac (VOLTAREN) 1 % topical gel, Apply 4 g topically 4 times daily as needed for moderate pain, Disp: 350 g, Rfl: 1     ESOMEPRAZOLE MAGNESIUM PO, Take 20 mg by mouth every morning (before breakfast), Disp: , Rfl:      fluticasone (FLOVENT HFA) 110 MCG/ACT inhaler, Inhale 2 puffs into the lungs 2 times daily In the Yellow Zone., Disp: 12 g, Rfl: 3     ipratropium - albuterol 0.5 mg/2.5 mg/3 mL (DUONEB) 0.5-2.5 (3) MG/3ML neb solution, Take 1 vial (3 mLs) by nebulization every 6 hours as needed for shortness of breath / dyspnea or wheezing, Disp: 30 vial, Rfl: 1     lisinopril-hydrochlorothiazide (ZESTORETIC) 10-12.5 MG tablet, Take 1 tablet by mouth daily, Disp: 90 tablet, Rfl: 3     metFORMIN (GLUCOPHAGE XR) 500 MG 24 hr tablet, Take 1 tablet (500 mg) by mouth daily (with dinner), Disp: 90 tablet, Rfl: 3     montelukast (SINGULAIR) 10 MG tablet, Take 1 tablet (10 mg) by mouth at bedtime, Disp: 90 tablet, Rfl: 3     Respiratory Therapy Supplies (NEBULIZER/TUBING/MOUTHPIECE) KIT, use with nebulizer, Disp: 1 kit, Rfl: 11     triamcinolone (KENALOG) 0.1 % external ointment, Apply sparingly to affected area twice daily as needed not longer than 14 days in a row. Do not apply on face, neck, armpits and groin area., Disp: 80 g, Rfl: 1     ADVAIR DISKUS 100-50 MCG/ACT inhaler, Inhale 1 puff into the lungs every 12 hours, Disp: 60 each, Rfl: 3     fluticasone-salmeterol (WIXELA INHUB) 100-50 MCG/ACT inhaler, Inhale 1 puff into the  lungs every 12 hours (Patient not taking: Reported on 1/23/2024), Disp: 180 each, Rfl: 0  Immunization History   Administered Date(s) Administered     COVID-19 12+ (2023-24) (Pfizer) 10/17/2023     COVID-19 Bivalent 18+ (Moderna) 12/19/2022     COVID-19 Monovalent 18+ (Moderna) 11/19/2021     COVID-19 Monovalent Booster 18+ (Moderna) 04/18/2022     COVID-19 Vaccine (Little) 03/05/2021     Influenza (H1N1) 01/11/2010     Influenza (IIV3) PF 11/02/1998, 12/19/2006, 10/29/2008, 08/26/2009, 10/05/2010, 01/04/2013     Influenza Vaccine 18-64 (Flublok) 10/21/2019, 11/18/2020, 10/14/2021, 10/27/2022, 10/17/2023     Influenza Vaccine >6 months,quad, PF 10/23/2015, 09/21/2017, 09/06/2018     Influenza Vaccine IM Ages 6-35 Months 4 Valent (PF) 01/30/2014     Pneumococcal 20 valent Conjugate (Prevnar 20) 10/27/2023     Pneumococcal 23 valent 01/29/2016     TD,PF 7+ (Tenivac) 12/02/2004, 04/18/2022     TDAP Vaccine (Adacel) 11/18/2008     Td (Adult), Adsorbed 09/29/1997     Varicella 10/15/2002, 11/11/2002     Zoster recombinant adjuvanted (SHINGRIX) 10/27/2023     Allergies   Allergen Reactions     Seasonal Allergies      Asthma and sinus issues     OBJECTIVE:                                                                 /69   Pulse 78   SpO2 98%         Physical Exam  Vitals and nursing note reviewed.   Constitutional:       General: He is not in acute distress.     Appearance: He is obese. He is not diaphoretic.   HENT:      Head: Normocephalic and atraumatic.      Right Ear: Tympanic membrane, ear canal and external ear normal.      Left Ear: Tympanic membrane, ear canal and external ear normal.      Nose: Septal deviation (leftward with septal spur) present. No mucosal edema or rhinorrhea.      Right Turbinates: Not enlarged, swollen or pale.      Left Turbinates: Not enlarged, swollen or pale.      Mouth/Throat:      Lips: Pink.      Mouth: Mucous membranes are moist.      Pharynx: Oropharynx is clear. No  pharyngeal swelling, oropharyngeal exudate or posterior oropharyngeal erythema.   Eyes:      General:         Right eye: No discharge.         Left eye: No discharge.      Conjunctiva/sclera: Conjunctivae normal.   Pulmonary:      Effort: Pulmonary effort is normal. No respiratory distress.      Breath sounds: Normal breath sounds and air entry. No stridor, decreased air movement or transmitted upper airway sounds. No decreased breath sounds, wheezing, rhonchi or rales.   Neurological:      Mental Status: He is alert and oriented to person, place, and time.   Psychiatric:         Mood and Affect: Mood normal.         Behavior: Behavior normal.         WORKUP:     SPIROMETRY       FVC 4.45 L (97% of predicted).     FEV1 3.51 L (102% of predicted).     FEV1/FVC 79%      I have reviewed and interpreted these results. My interpretation:The office spirometry performed today doesn't suggest an obstruction.     Asthma Control Test (ACT) total score: 25     ASSESSMENT/PLAN:    Moderate persistent asthma, uncomplicated  Asthma is well-controlled.  Successfully stepdown to fluticasone-salmeterol 100/50 mcg 1 puff twice daily.  - Continue as is.  - Continue montelukast 10 mg by mouth once daily.  - Continue azithromycin on Mondays, Wednesdays, and Fridays.  - Continue albuterol 2 to 4 puffs every 4 hours as needed for persistent cough/chest tightness/wheezing/shortness of breath.  - Add Qvar 80 mcg 2 puffs twice daily when he gets sick with bronchitis.  He used to use Flovent, but it has been discontinued.    - BREATHING CAPACITY TEST [66870]  - beclomethasone HFA (QVAR REDIHALER) 80 MCG/ACT inhaler  Dispense: 10.6 g; Refill: 3    Chronic rhinitis  Other chronic sinusitis  IgM deficiency (H)     IgM deficiency causing recurrent sinusitis and recurrent bronchitis. Fairly well-controlled with azithromycin on Mondays, Wednesdays, and Fridays, budesonide irrigations once daily, saline irrigations twice daily (1 time with  budesonide), and azelastine as needed.  - Continue as is.  In Summer, he can try to stop azithromycin again for several months.     Follow-up in 3 months or sooner if needed.     Thank you for allowing us to participate in the care of this patient. Please feel free to contact us if there are any questions or concerns about the patient.    Disclaimer: This note consists of symbols derived from keyboarding, dictation and/or voice recognition software. As a result, there may be errors in the script that have gone undetected. Please consider this when interpreting information found in this chart.    Jose Angel Leiva MD, FAAAAI, FACAAI  Allergy, Asthma and Immunology     [facility]        Again, thank you for allowing me to participate in the care of your patient.        Sincerely,        Jose Angel Leiva MD

## 2024-02-04 ENCOUNTER — HEALTH MAINTENANCE LETTER (OUTPATIENT)
Age: 64
End: 2024-02-04

## 2024-03-07 ENCOUNTER — TELEPHONE (OUTPATIENT)
Dept: FAMILY MEDICINE | Facility: CLINIC | Age: 64
End: 2024-03-07
Payer: COMMERCIAL

## 2024-03-07 NOTE — TELEPHONE ENCOUNTER
Patient Quality Outreach    Patient is due for the following:   Diabetes -  A1C, Eye Exam, Diabetic Follow-Up Visit, and Foot Exam  Colon Cancer Screening    Next Steps:   Schedule a office visit for diabetes follow up    Type of outreach:    Sent letter.    Next Steps:  Reach out within 90 days via Letter.    Max number of attempts reached: Yes. Will try again in 90 days if patient still on fail list.    Questions for provider review:    None           Kristin CARDENAS LPN

## 2024-03-07 NOTE — LETTER
March 7, 2024      Markell Ramsey  [address] [facility] [address]    Your team at [facility] cares about your health. We have reviewed your chart and based on our findings; we are making the following recommendations to better manage your health.     You are in particular need of attention regarding the following:     Schedule a DIABETIC FOLLOW UP appointment for Office Visit. Patients with diabetes should see their provider regularly.  Schedule a DIABETIC EYE EXAM.  This exam is done with an optometrist, annually. You can schedule this appointment with your eye doctor.  If you need a referral, please let us know.  Call or MyChart message your clinic to schedule a colonoscopy, schedule/ a FIT Test, or order a Cologuard test. If you are unsure what type of test you need, please call your clinic and speak to clinic staff.   Colon cancer is now the second leading cause of cancer-related deaths in the [place] for both men and women and there are over 130,000 new cases and 50,000 deaths per year from colon cancer. Colonoscopies can prevent 90-95% of these deaths. Problem lesions can be removed before they ever become cancer. This test is not only looking for cancer, but also getting rid of precancerous lesions.   If you are under/uninsured, we recommend you contact the [employer] Program.[employer] is a free colorectal cancer screening program that provides colonoscopies for eligible under/uninsured [place] men and women. If you are interested in receiving a free colonoscopy, please call [employer] at t [phone] (mention code [employer]) to see if you're eligible. Please have them send us the results.     If you have already completed these items, please contact the clinic via phone or   Dashbook[employer] so your care team can review and update your records. Thank you for   choosing [facility] for your healthcare needs. For any questions,   concerns, or to schedule an  appointment please contact our clinic at [phone].    Healthy Regards,      Your  Health [facility] Team

## 2024-04-15 ENCOUNTER — MYC REFILL (OUTPATIENT)
Dept: ALLERGY | Facility: CLINIC | Age: 64
End: 2024-04-15
Payer: COMMERCIAL

## 2024-04-15 DIAGNOSIS — J45.40 MODERATE PERSISTENT ASTHMA, UNCOMPLICATED: ICD-10-CM

## 2024-04-15 NOTE — LETTER
My Asthma Action Plan  Name: Markell Ramsey   YOB: 1960  Date: 4/18/2024   My doctor: Jose Angel Leiva MD   My clinic: [facility]        My Control Medicine: Fluticasone propionate + salmeterol (Advair Diskus or Wixela Inhub) -  100/50 mcg 1 puff twice daily, montelukast 10 mg by mouth once daily, azithromycin 250 mg on Mondays, Wednesdays, and Fridays  My Rescue Medicine: Albuterol nebulizer solution every 4 hrs as needed  Albuterol (Proair/Ventolin/Proventil) inhaler 2-4 puffs every 4 hrs as needed   My Asthma Severity: moderate persistent  Avoid your asthma triggers: upper respiratory infections               GREEN ZONE   Good Control  I feel good  No cough or wheeze  Can work, sleep and play without asthma symptoms       Take your asthma control medicine every day.     If exercise triggers your asthma, take your rescue medication  15 minutes before exercise or sports, and  During exercise if you have asthma symptoms  Spacer to use with inhaler: If you have a spacer, make sure to use it with your inhaler             YELLOW ZONE Getting Worse  I have ANY of these:  I do not feel good  Cough or wheeze  Chest feels tight  Wake up at night   Keep taking your Green Zone medications  And Pulmicort 90 mcg 2 puffs twice daily  Start taking your rescue medicine:  every 20 minutes for up to 1 hour. Then every 4 hours for 24-48 hours.  If you stay in the Yellow Zone for more than 48  hours, contact your doctor.             RED ZONE Medical Alert - Get Help  I have ANY of these:  I feel awful  Medicine is not helping  Breathing getting harder  Trouble walking or talking  Nose opens wide to breathe       Take your rescue medicine NOW  If your provider has prescribed an oral steroid medicine, start taking it NOW  Call your doctor NOW  If you are still in the Red Zone after 20 minutes and you have not reached your doctor:  Take your rescue medicine again and  Call 911 or go to the emergency room right  away    See your regular doctor within 2 weeks of an Emergency Room or Urgent Care visit for follow-up treatment.        Electronically signed by: 4/18/2024  Jose Angel Leiva MD     Annual Reminders:  Meet with Asthma Educator,  Flu Shot in the Fall, consider Pneumonia Vaccination for patients with asthma (aged 19 and older).    Pharmacy:    [id number] - [facility] [address] AT NW OF [facility]  [facility] - [facility] [address] AT PORTAL TO REGISTERED [facility] [address]  [id number] - [place], [place] - [address]                    Asthma Triggers  How To Control Things That Make Your Asthma Worse    Triggers are things that make your asthma worse.  Look at the list below to help you find your triggers and what you can do about them.  You can help prevent asthma flare-ups by staying away from your triggers.      Trigger                                                          What you can do   Cigarette Smoke  Tobacco smoke can make asthma worse. Do not allow smoking in your home, car or around you.  Be sure no one smokes at a child s day care or school.  If you smoke, ask your health care provider for ways to help you quit.  Ask family members to quit too.  Ask your health care provider for a referral to Quit Plan to help you quit smoking, or call 9-750-957-PLAN.     Colds, Flu, Bronchitis  These are common triggers of asthma. Wash your hands often.  Don t touch your eyes, nose or mouth.  Get a flu shot every year.     Dust Mites  These are tiny bugs that live in cloth or carpet. They are too small to see. Wash sheets and blankets in hot water every week.   Encase pillows and mattress in dust mite proof covers.  Avoid having carpet if you can. If you have carpet, vacuum weekly.   Use a dust mask and HEPA vacuum.   Pollen and Outdoor Mold  Some people are allergic to trees, grass, or weed pollen, or molds. Try to keep your windows  closed.  Limit time out doors when pollen count is high.   Ask you health care provider about taking medicine during allergy season.     Animal Dander  Some people are allergic to skin flakes, urine or saliva from pets with fur or feathers. Keep pets with fur or feathers out of your home.    If you can t keep the pet outdoors, then keep the pet out of your bedroom.  Keep the bedroom door closed.  Keep pets off cloth furniture and away from stuffed toys.     Mice, Rats, and Cockroaches  Some people are allergic to the waste from these pests.   Cover food and garbage.  Clean up spills and food crumbs.  Store grease in the refrigerator.   Keep food out of the bedroom.   Indoor Mold  This can be a trigger if your home has high moisture. Fix leaking faucets, pipes, or other sources of water.   Clean moldy surfaces.  Dehumidify basement if it is damp and smelly.   Smoke, Strong Odors, and Sprays  These can reduce air quality. Stay away from strong odors and sprays, such as perfume, powder, hair spray, paints, smoke incense, paint, cleaning products, candles and new carpet.   Exercise or Sports  Some people with asthma have this trigger. Be active!  Ask your doctor about taking medicine before sports or exercise to prevent symptoms.    Warm up for 5-10 minutes before and after sports or exercise.     Other Triggers of Asthma  Cold air:  Cover your nose and mouth with a scarf.  Sometimes laughing or crying can be a trigger.  Some medicines and food can trigger asthma.

## 2024-04-16 RX ORDER — ALBUTEROL SULFATE 90 UG/1
2-4 AEROSOL, METERED RESPIRATORY (INHALATION) EVERY 4 HOURS PRN
Qty: 18 G | Refills: 3 | Status: SHIPPED | OUTPATIENT
Start: 2024-04-16

## 2024-04-16 RX ORDER — BECLOMETHASONE DIPROPIONATE HFA 80 UG/1
2 AEROSOL, METERED RESPIRATORY (INHALATION) 2 TIMES DAILY
Qty: 10.6 G | Refills: 3 | Status: SHIPPED | OUTPATIENT
Start: 2024-04-16 | End: 2024-04-16

## 2024-04-16 RX ORDER — BECLOMETHASONE DIPROPIONATE HFA 80 UG/1
2 AEROSOL, METERED RESPIRATORY (INHALATION) 2 TIMES DAILY
Qty: 31.8 G | Refills: 1 | Status: SHIPPED | OUTPATIENT
Start: 2024-04-16 | End: 2024-04-18

## 2024-04-16 NOTE — TELEPHONE ENCOUNTER
Please review My Chart request. States QVAR not covered.     Pt is requesting a new medication option.     Delphine LU RN  Specialty/Allergy Clinics

## 2024-04-16 NOTE — TELEPHONE ENCOUNTER
Received a fax from [employer]  for Script Clarification:   Can you rewrite for a QTY of 31.8 grams, insurance only covers if filled as a 90 day supply at a time.

## 2024-04-17 NOTE — TELEPHONE ENCOUNTER
Received Alternative request from [facility] [place]  Pharmacy comments: The prescribed medication in not covered by insurance please consider changing to one of the suggested covered alternatives.   Suggested alternatives: Pulmicort 180 MCG Flexhaler

## 2024-04-18 NOTE — TELEPHONE ENCOUNTER
Prescribed Pulmicort 90 mcg 2 puffs twice daily in Yellow Zone/with bronchitis..  Please notify the patient.  Jose Angel Leiva MD

## 2024-06-12 NOTE — TELEPHONE ENCOUNTER
Follow up    Last OV 10/29/19.  1 year f/u appointment advised.    Antihistamines Protocol Passed1/22 10:20 AM   Patient is 3-64 years of age    Recent (12 mo) or future (30 days) visit within the authorizing provider's specialty    Medication is active on med list     Refilled per [facility] protocol and per scope of practice.  Kristin Petty RN

## 2024-06-23 ENCOUNTER — HEALTH MAINTENANCE LETTER (OUTPATIENT)
Age: 64
End: 2024-06-23

## 2024-07-21 ASSESSMENT — ASTHMA QUESTIONNAIRES
QUESTION_2 LAST FOUR WEEKS HOW OFTEN HAVE YOU HAD SHORTNESS OF BREATH: NOT AT ALL
ACT_TOTALSCORE: 25
QUESTION_5 LAST FOUR WEEKS HOW WOULD YOU RATE YOUR ASTHMA CONTROL: COMPLETELY CONTROLLED
QUESTION_3 LAST FOUR WEEKS HOW OFTEN DID YOUR ASTHMA SYMPTOMS (WHEEZING, COUGHING, SHORTNESS OF BREATH, CHEST TIGHTNESS OR PAIN) WAKE YOU UP AT NIGHT OR EARLIER THAN USUAL IN THE MORNING: NOT AT ALL
QUESTION_4 LAST FOUR WEEKS HOW OFTEN HAVE YOU USED YOUR RESCUE INHALER OR NEBULIZER MEDICATION (SUCH AS ALBUTEROL): NOT AT ALL
QUESTION_1 LAST FOUR WEEKS HOW MUCH OF THE TIME DID YOUR ASTHMA KEEP YOU FROM GETTING AS MUCH DONE AT WORK, SCHOOL OR AT HOME: NONE OF THE TIME

## 2024-07-26 ENCOUNTER — OFFICE VISIT (OUTPATIENT)
Dept: ALLERGY | Facility: CLINIC | Age: 64
End: 2024-07-26
Payer: COMMERCIAL

## 2024-07-26 VITALS
HEART RATE: 83 BPM | TEMPERATURE: 97.3 F | DIASTOLIC BLOOD PRESSURE: 79 MMHG | SYSTOLIC BLOOD PRESSURE: 120 MMHG | OXYGEN SATURATION: 97 % | WEIGHT: 237.2 LBS | BODY MASS INDEX: 35.03 KG/M2

## 2024-07-26 DIAGNOSIS — J45.40 MODERATE PERSISTENT ASTHMA, UNCOMPLICATED: Primary | ICD-10-CM

## 2024-07-26 DIAGNOSIS — D80.4 IGM DEFICIENCY (H): ICD-10-CM

## 2024-07-26 DIAGNOSIS — J32.8 OTHER CHRONIC SINUSITIS: ICD-10-CM

## 2024-07-26 DIAGNOSIS — J31.0 CHRONIC RHINITIS: ICD-10-CM

## 2024-07-26 PROBLEM — E66.01 CLASS 2 SEVERE OBESITY DUE TO EXCESS CALORIES WITH SERIOUS COMORBIDITY IN ADULT (H): Status: ACTIVE | Noted: 2024-07-26

## 2024-07-26 PROBLEM — E66.812 CLASS 2 SEVERE OBESITY DUE TO EXCESS CALORIES WITH SERIOUS COMORBIDITY IN ADULT (H): Status: ACTIVE | Noted: 2024-07-26

## 2024-07-26 LAB
FEF 25/75: NORMAL
FEV-1: NORMAL
FEV1/FVC: NORMAL
FVC: NORMAL

## 2024-07-26 PROCEDURE — 99214 OFFICE O/P EST MOD 30 MIN: CPT | Mod: 25 | Performed by: ALLERGY & IMMUNOLOGY

## 2024-07-26 PROCEDURE — 94010 BREATHING CAPACITY TEST: CPT | Performed by: ALLERGY & IMMUNOLOGY

## 2024-07-26 RX ORDER — FLUTICASONE FUROATE AND VILANTEROL TRIFENATATE 50; 25 UG/1; UG/1
1 POWDER RESPIRATORY (INHALATION) DAILY
Qty: 60 EACH | Refills: 3 | Status: SHIPPED | OUTPATIENT
Start: 2024-07-26

## 2024-07-26 RX ORDER — MONTELUKAST SODIUM 10 MG/1
10 TABLET ORAL AT BEDTIME
Qty: 90 TABLET | Refills: 3 | Status: SHIPPED | OUTPATIENT
Start: 2024-07-26

## 2024-07-26 RX ORDER — AZITHROMYCIN 250 MG/1
TABLET, FILM COATED ORAL
Qty: 36 TABLET | Refills: 3 | Status: SHIPPED | OUTPATIENT
Start: 2024-07-26

## 2024-07-26 RX ORDER — BUDESONIDE 0.25 MG/2ML
INHALANT ORAL
Qty: 360 ML | Refills: 1 | Status: SHIPPED | OUTPATIENT
Start: 2024-07-26

## 2024-07-26 RX ORDER — AZELASTINE 1 MG/ML
1 SPRAY, METERED NASAL 2 TIMES DAILY PRN
Qty: 90 ML | Refills: 2 | Status: SHIPPED | OUTPATIENT
Start: 2024-07-26

## 2024-07-26 NOTE — PATIENT INSTRUCTIONS
Based on our discussion, I have outlined the following instructions for you:    - Stop using Wixela and start using Breo Ellipto 50/25 1 puff once daily for your asthma.  - Continue using Pulmicort in the Yellow Zone as needed.    -Continue using albuterol inhaler 2-4 puffs every 4 hours as needed for chest tightness/wheezing/shortness of breath/persistent cough.      - Schedule a follow-up appointment in three months to check how well Breo is working.    - Continue taking montelukast once daily.   -continue Azelastine as needed for your chronic rhinitis.  - Keep using a saline sinus rinse in the morning and Budesonide/salines in the evening.  Start azithromycin in Fall when the school starts.     Thank you again for your visit, and we look forward to supporting you in your journey to better health.      Prescription Assistance  If you need assistance with your prescriptions (cost, coverage, etc) please contact: [place] [facility] [phone]           If labs have been ordered/completed, please allow 7-14 business days for final interpretation of results to be sent on My Chart, phone or mail. Some lab results can take up to 28 days for results.         Allergy Staff Appt Hours Shot Hours Locations    Physician      Jose Angel Leiva MD         Support Staff      Delphine Barlow MA     Tuesday:   [place] 7-5 Wednesday:  [place]: 7-5 Thursday:         [place] 7-5 Friday:  [place] 7-3     [place]        Tuesday: 7- 4:20        Wednesday: 7-4:20      [place] Monday: 7-4:10        Tuesday: 7-4:10        Thursday: 7-3:10     [facility]       Tues & Wed: 7-5:10       Thurs: 12-4:10       Fri: 7-11            [facility]  [address] [place] [address]  Appt Line: [phone]        [facility]  [address] [place] [address]  Appt Line: [phone]     Pulmonary Function Scheduling:  [facility] [place]: [phone]  [place]:  [phone]  [phone]

## 2024-07-26 NOTE — LETTER
7/26/2024      Markell Ramsey  [address] [id number]      Dear Colleague,    Thank you for referring your patient, Markell Ramsey, to the [facility]. Please see a copy of my visit note below.    SUBJECTIVE:                                                                   Markell Ramsey presents today to our Allergy Clinic at [facility] for a follow up visit. He is a 64 year old male with moderate persistent asthma, allergic rhinitis, recurrent sinus infection/bronchitis and decreased IgM.     November 2017:Normal CH 50. Normal total IgE.  Normal total IgG.  Normal IgG subclasses.   Normal total IgA level. Decreased IgM level on multiple occasions. Normal T and B cells (flow cytometry).  20/22 protective pneumococcal antibody levels. Protective tetanus antibody level.      SPT in 2007 was positive to molds, tree pollen, grass mix, and ragweed. IDs were positive for cat, roaches, dog, mite. Aspergillus mold, and marsh elder. Started by Dr. Whittington on allergen IT in 2007,  [facility] Whittington [facility] to practice before. He received allergen immunotherapy on and off until 2010, which he found partially helpful.   Sinus CT in 2013, with mild-moderate sinus disease, and DNS on the left. The patient confirms nasal congestion L>R.  IMPRESSION:  Mild/moderate mucosal thickening in the paranasal sinuses as described above.  Slight worsening of the right maxillary sinus and worsening of the right frontal sinus since the previous exam, but clearing of the left sphenoid sinus.      Asthma:    Markell's asthma has been very well-controlled. He has been using Wixela 100/50 mcg, taking one puff twice daily, along with montelukast 10 mg by mouth once daily. He uses albuterol as needed and also has Pulmicort 90 mcg inhaler for use in the Yellow Zone, although he hasn't needed it in a long time. Markell uses albuterol HFA less than twice weekly for  rescue from chest symptoms and wakes up less than twice per month due to these symptoms. There has been no need for oral steroids since the last visit, and he has not required any ED, PCP, urgent care, or other specialist visits for asthma flares. He denies current chest tightness, cough, wheezing, or shortness of breath. His asthma triggers remain unchanged, and he uses a spacer/chamber device where applicable.    Chronic Rhinitis/Chronic Sinusitis/IgM Deficiency:    For his chronic rhinitis and sinusitis, Markell uses budesonide/saline irrigations (0.25 mg/2 mL) nightly and performs saline rinses without steroids in the morning. He keeps azelastine on hand for when his sinus symptoms worsen. He reports having a sinus infection in Spring 2024, which resolved on its own. Markell prefers not to switch to intranasal fluticasone and will continue using Pulmicort/saline irrigations when his symptoms worsen. He stopped prophylactic azithromycin for the summer but plans to restart it in the fall, considering his exposure to various illnesses through his family, as his wife is a teacher and his daughter is in high school.            Patient Active Problem List   Diagnosis     Gastroesophageal reflux disease without esophagitis     Non morbid obesity due to excess calories     IgM deficiency (H)     Moderate persistent asthma, uncomplicated     Essential hypertension     Hyperlipidemia LDL goal <130     DNS (deviated nasal septum)     Other chronic rhinitis     Arthritis of right knee     Type 2 diabetes mellitus without complication, without long-term current use of insulin (H)     Class 2 severe obesity due to excess calories with serious comorbidity in adult (H)       Past Medical History:   Diagnosis Date     Acute bronchitis      Allergic rhinitis, cause unspecified 09/23/2008     Allergic rhinitis, unspecified allergic rhinitis type 09/23/2008     Problem list name updated by automated process. Provider to review      Arthritis of right knee 10/17/2023     Diabetes mellitus, type 2 (H) 10/27/2023     Esophageal reflux      Hypertension 2014     Pneumonia 02/24/2013    MRSA, required hospitalization     Pre-diabetes      Uncomplicated asthma 1972     Unspecified asthma(493.90)       Problem (# of Occurrences) Relation (Name,Age of Onset)    Allergies (1) Mother: asthma    Asthma (1) Daughter (Shirin)    Cancer (1) Father (Ruslan): lung    Respiratory (3) Sister: asthma, Son (Wisam): Upper Resp infections, Son (David): asthma    Other Cancer (1) Father (Ruslan): Braulio          Past Surgical History:   Procedure Laterality Date     BACK SURGERY  2006    L4 / L5 Discectomy     SURGICAL HISTORY OF -   02/23/2006    L4-5 microdiscectomy     TONSILLECTOMY & ADENOIDECTOMY       ZZC APPENDECTOMY  08/01/2006     Social History     Socioeconomic History     Marital status:      Spouse name: None     Number of children: None     Years of education: None     Highest education level: None   Occupational History     Employer: [employer]   Tobacco Use     Smoking status: Never     Smokeless tobacco: Never   Vaping Use     Vaping status: Never Used   Substance and Sexual Activity     Alcohol use: Yes     Comment: rare     Drug use: No     Sexual activity: Yes     Partners: Female   Other Topics Concern     Parent/sibling w/ CABG, MI or angioplasty before 65F 55M? No   Social History Narrative    07/26/24        ENVIRONMENTAL HISTORY: [place] The home is heated with a forced air and gas furnace. They does have central air conditioning. The patient's bedroom is furnished with hard naseem in bedroom, allergen mattress cover and allergen pillowcase cover.  Has 2 rabbit  inside the house since Summer 2020. There is no history of cockroach or mice infestation. There are no smokers in the house.  The house does not have a damp basement.              Social Determinants of Health     Financial Resource  Strain: Low Risk  (10/16/2023)    Financial Resource Strain      Within the past 12 months, have you or your family members you live with been unable to get utilities (heat, electricity) when it was really needed?: No   Food Insecurity: Low Risk  (10/16/2023)    Food Insecurity      Within the past 12 months, did you worry that your food would run out before you got money to buy more?: No      Within the past 12 months, did the food you bought just not last and you didn t have money to get more?: No   Transportation Needs: Low Risk  (10/16/2023)    Transportation Needs      Within the past 12 months, has lack of transportation kept you from medical appointments, getting your medicines, non-medical meetings or appointments, work, or from getting things that you need?: No   Interpersonal Safety: Low Risk  (10/17/2023)    Interpersonal Safety      Do you feel physically and emotionally safe where you currently live?: Yes      Within the past 12 months, have you been hit, slapped, kicked or otherwise physically hurt by someone?: No      Within the past 12 months, have you been humiliated or emotionally abused in other ways by your partner or ex-partner?: No   Housing Stability: Low Risk  (10/16/2023)    Housing Stability      Do you have housing? : Yes      Are you worried about losing your housing?: No         Review of Systems         Current Outpatient Medications:      atorvastatin (LIPITOR) 10 MG tablet, Take 1 tablet (10 mg) by mouth daily, Disp: 90 tablet, Rfl: 3     azelastine (ASTELIN) 0.1 % nasal spray, Spray 1 spray into both nostrils 2 times daily as needed for rhinitis or allergies, Disp: 90 mL, Rfl: 2     azithromycin (ZITHROMAX) 250 MG tablet, Take one tablet by mouth once daily on Mondays, Wednesdays and Fridays., Disp: 36 tablet, Rfl: 3     budesonide (PULMICORT FLEXHALER) 90 MCG/ACT inhaler, Inhale 2 puffs into the lungs 2 times daily In yellow zone, Disp: 1 each, Rfl: 3     budesonide (PULMICORT) 0.25  MG/2ML neb solution, Mix respule of 0.25mg/2 mL budesonide vial in 8oz normal saline sinus rinse bottle. Irrigate each nostril with half of the bottle once daily., Disp: 360 mL, Rfl: 1     ESOMEPRAZOLE MAGNESIUM PO, Take 20 mg by mouth every morning (before breakfast), Disp: , Rfl:      Fluticasone Furoate-Vilanterol (BREO ELLIPTA) 50-25 MCG/ACT AEPB, Inhale 1 puff into the lungs daily, Disp: 60 each, Rfl: 3     lisinopril-hydrochlorothiazide (ZESTORETIC) 10-12.5 MG tablet, Take 1 tablet by mouth daily, Disp: 90 tablet, Rfl: 3     metFORMIN (GLUCOPHAGE XR) 500 MG 24 hr tablet, Take 1 tablet (500 mg) by mouth daily (with dinner), Disp: 90 tablet, Rfl: 3     montelukast (SINGULAIR) 10 MG tablet, Take 1 tablet (10 mg) by mouth at bedtime, Disp: 90 tablet, Rfl: 3     Respiratory Therapy Supplies (NEBULIZER/TUBING/MOUTHPIECE) KIT, use with nebulizer, Disp: 1 kit, Rfl: 11     albuterol (VENTOLIN HFA) 108 (90 Base) MCG/ACT inhaler, Inhale 2-4 puffs into the lungs every 4 hours as needed for shortness of breath Take before exercise. (Patient not taking: Reported on 7/26/2024), Disp: 18 g, Rfl: 3     diclofenac (VOLTAREN) 1 % topical gel, Apply 4 g topically 4 times daily as needed for moderate pain (Patient not taking: Reported on 7/26/2024), Disp: 350 g, Rfl: 1     ipratropium - albuterol 0.5 mg/2.5 mg/3 mL (DUONEB) 0.5-2.5 (3) MG/3ML neb solution, Take 1 vial (3 mLs) by nebulization every 6 hours as needed for shortness of breath / dyspnea or wheezing (Patient not taking: Reported on 7/26/2024), Disp: 30 vial, Rfl: 1     triamcinolone (KENALOG) 0.1 % external ointment, Apply sparingly to affected area twice daily as needed not longer than 14 days in a row. Do not apply on face, neck, armpits and groin area. (Patient not taking: Reported on 7/26/2024), Disp: 80 g, Rfl: 1  Immunization History   Administered Date(s) Administered     COVID-19 12+ (2023-24) (Pfizer) 10/17/2023     COVID-19 Bivalent 18+ (Moderna) 12/19/2022      COVID-19 Monovalent Booster 18+ (Moderna) 04/18/2022     Influenza (H1N1) 01/11/2010     Influenza (IIV3) PF 11/02/1998, 12/19/2006, 10/29/2008, 08/26/2009, 10/05/2010, 01/04/2013     Influenza Vaccine 18-64 (Flublok) 10/21/2019, 11/18/2020, 10/14/2021, 10/27/2022, 10/17/2023     Influenza Vaccine >6 months,quad, PF 10/23/2015, 09/21/2017, 09/06/2018     Influenza Vaccine IM Ages 6-35 Months 4 Valent (PF) 01/30/2014     Pneumococcal 20 valent Conjugate (Prevnar 20) 10/27/2023     Pneumococcal 23 valent 01/29/2016     TD,PF 7+ (Tenivac) 12/02/2004, 04/18/2022     TDAP Vaccine (Adacel) 11/18/2008     Td (Adult), Adsorbed 09/29/1997     Varicella 10/15/2002, 11/11/2002     Zoster recombinant adjuvanted (SHINGRIX) 10/27/2023     Allergies   Allergen Reactions     Seasonal Allergies      Asthma and sinus issues     OBJECTIVE:                                                                 /79 (BP Location: Left arm, Patient Position: Sitting, Cuff Size: Adult Regular)   Pulse 83   Temp 97.3  F (36.3  C) (Tympanic)   Wt 107.6 kg (237 lb 3.2 oz)   SpO2 97%   BMI 35.03 kg/m          Physical Exam  Vitals and nursing note reviewed.   Constitutional:       General: He is not in acute distress.     Appearance: He is obese. He is not diaphoretic.   HENT:      Head: Normocephalic and atraumatic.      Right Ear: Tympanic membrane, ear canal and external ear normal.      Left Ear: Tympanic membrane, ear canal and external ear normal.      Nose: Septal deviation (leftward with septal spur) present. No mucosal edema or rhinorrhea.      Right Turbinates: Not enlarged, swollen or pale.      Left Turbinates: Not enlarged, swollen or pale.      Mouth/Throat:      Lips: Pink.      Mouth: Mucous membranes are moist.      Pharynx: Oropharynx is clear. No pharyngeal swelling, oropharyngeal exudate or posterior oropharyngeal erythema.   Eyes:      General:         Right eye: No discharge.         Left eye: No discharge.       Conjunctiva/sclera: Conjunctivae normal.   Pulmonary:      Effort: Pulmonary effort is normal. No respiratory distress.      Breath sounds: Normal breath sounds and air entry. No stridor, decreased air movement or transmitted upper airway sounds. No decreased breath sounds, wheezing, rhonchi or rales.   Neurological:      Mental Status: He is alert and oriented to person, place, and time.   Psychiatric:         Mood and Affect: Mood normal.         Behavior: Behavior normal.              WORKUP:     SPIROMETRY       FVC 3.77L (83% of predicted).     FEV1 3.15L (92% of predicted).     FEV1/FVC 83%      I have reviewed and interpreted these results. My interpretation:The office spirometry performed today doesn't suggest an obstruction.     Asthma Control Test (ACT) total score: 24            ASSESSMENT/PLAN:           Moderate persistent asthma, uncomplicated    Well-controlled with the current regimen of Wixela 100/50 mcg 1 puff twice daily, montelukast 10 mg by mouth once daily, and albuterol as needed.  Plan:    -Continue montelukast 10 mg by mouth once daily.  -Switch from Wixela to Breo Ellipta 50/25 mcg 1 puff once daily.  -Continue using Pulmicort in the Yellow Zone. Depending on future symptom control, consider transitioning from Breo Ellipta to Pulmicort inhaler in the Green Zone.  -Use albuterol inhaler 2-4 puffs every 4 hours as needed for chest tightness, wheezing, shortness of breath, or persistent cough. Ensure the use of a spacer/chamber device with the albuterol inhaler.    - Fluticasone Furoate-Vilanterol (BREO ELLIPTA) 50-25 MCG/ACT AEPB  Dispense: 60 each; Refill: 3  - montelukast (SINGULAIR) 10 MG tablet  Dispense: 90 tablet; Refill: 3  - BREATHING CAPACITY TEST [56744]    Chronic rhinitis  Other chronic sinusitis  IgM deficiency (H)    Well-managed with the current regimen:  Budesonide 0.25 mg/2 mL in saline irrigations nightly, saline rinses in the morning, azelastine as needed, montelukast 10 mg  by mouth once daily,   azithromycin 250 mg by mouth on Mondays, Wednesdays, and Fridays during fall, winter, and spring helps with recurrent sinus infections.  - Continue the current regimen as is.  The patient has declined to switch to intranasal fluticasone and prefers to continue with budesonide/saline irrigations.    - azelastine (ASTELIN) 0.1 % nasal spray  Dispense: 90 mL; Refill: 2  - budesonide (PULMICORT) 0.25 MG/2ML neb solution  Dispense: 360 mL; Refill: 1  - azithromycin (ZITHROMAX) 250 MG tablet  Dispense: 36 tablet; Refill: 3       Follow up in 3 months or sooner if needed, to check on asthma control.      Thank you for allowing us to participate in the care of this patient. Please feel free to contact us if there are any questions or concerns about the patient.    Disclaimer: This note consists of symbols derived from keyboarding, dictation and/or voice recognition software. As a result, there may be errors in the script that have gone undetected. Please consider this when interpreting information found in this chart.    Consent was obtained from the patient to use an AI documentation tool in the creation of this note.     Jose Angel Leiva MD, FAAAAI, FACAAI  Allergy, Asthma and Immunology     [facility]        Again, thank you for allowing me to participate in the care of your patient.        Sincerely,        Jose Angel Leiva MD

## 2024-07-26 NOTE — PROGRESS NOTES
SUBJECTIVE:                                                                   Markell Ramsey presents today to our Allergy Clinic at [facility] for a follow up visit. He is a 64 year old male with moderate persistent asthma, allergic rhinitis, recurrent sinus infection/bronchitis and decreased IgM.     November 2017:Normal CH 50. Normal total IgE.  Normal total IgG.  Normal IgG subclasses.   Normal total IgA level. Decreased IgM level on multiple occasions. Normal T and B cells (flow cytometry).  20/22 protective pneumococcal antibody levels. Protective tetanus antibody level.      SPT in 2007 was positive to molds, tree pollen, grass mix, and ragweed. IDs were positive for cat, roaches, dog, mite. Aspergillus mold, and marsh elder. Started by Dr. Whittington on allergen IT in 2007,  [facility] Whittington [facility] to practice before. He received allergen immunotherapy on and off until 2010, which he found partially helpful.   Sinus CT in 2013, with mild-moderate sinus disease, and DNS on the left. The patient confirms nasal congestion L>R.  IMPRESSION:  Mild/moderate mucosal thickening in the paranasal sinuses as described above.  Slight worsening of the right maxillary sinus and worsening of the right frontal sinus since the previous exam, but clearing of the left sphenoid sinus.      Asthma:    Markell's asthma has been very well-controlled. He has been using Wixela 100/50 mcg, taking one puff twice daily, along with montelukast 10 mg by mouth once daily. He uses albuterol as needed and also has Pulmicort 90 mcg inhaler for use in the Yellow Zone, although he hasn't needed it in a long time. Markell uses albuterol HFA less than twice weekly for rescue from chest symptoms and wakes up less than twice per month due to these symptoms. There has been no need for oral steroids since the last visit, and he has not required any ED, PCP, urgent care, or other specialist visits for  asthma flares. He denies current chest tightness, cough, wheezing, or shortness of breath. His asthma triggers remain unchanged, and he uses a spacer/chamber device where applicable.    Chronic Rhinitis/Chronic Sinusitis/IgM Deficiency:    For his chronic rhinitis and sinusitis, Markell uses budesonide/saline irrigations (0.25 mg/2 mL) nightly and performs saline rinses without steroids in the morning. He keeps azelastine on hand for when his sinus symptoms worsen. He reports having a sinus infection in Spring 2024, which resolved on its own. Markell prefers not to switch to intranasal fluticasone and will continue using Pulmicort/saline irrigations when his symptoms worsen. He stopped prophylactic azithromycin for the summer but plans to restart it in the fall, considering his exposure to various illnesses through his family, as his wife is a teacher and his daughter is in high school.            Patient Active Problem List   Diagnosis    Gastroesophageal reflux disease without esophagitis    Non morbid obesity due to excess calories    IgM deficiency (H)    Moderate persistent asthma, uncomplicated    Essential hypertension    Hyperlipidemia LDL goal <130    DNS (deviated nasal septum)    Other chronic rhinitis    Arthritis of right knee    Type 2 diabetes mellitus without complication, without long-term current use of insulin (H)    Class 2 severe obesity due to excess calories with serious comorbidity in adult (H)       Past Medical History:   Diagnosis Date    Acute bronchitis     Allergic rhinitis, cause unspecified 09/23/2008    Allergic rhinitis, unspecified allergic rhinitis type 09/23/2008     Problem list name updated by automated process. Provider to review    Arthritis of right knee 10/17/2023    Diabetes mellitus, type 2 (H) 10/27/2023    Esophageal reflux     Hypertension 2014    Pneumonia 02/24/2013    MRSA, required hospitalization    Pre-diabetes     Uncomplicated asthma 1972    Unspecified  asthma(493.90)       Problem (# of Occurrences) Relation (Name,Age of Onset)    Allergies (1) Mother: asthma    Asthma (1) Daughter (Shirin)    Cancer (1) Father (Ruslan): lung    Respiratory (3) Sister: asthma, Son (Wisam): Upper Resp infections, Son (David): asthma    Other Cancer (1) Father (Ruslan): Braulio          Past Surgical History:   Procedure Laterality Date    BACK SURGERY  2006    L4 / L5 Discectomy    SURGICAL HISTORY OF -   02/23/2006    L4-5 microdiscectomy    TONSILLECTOMY & ADENOIDECTOMY      ZZC APPENDECTOMY  08/01/2006     Social History     Socioeconomic History    Marital status:      Spouse name: None    Number of children: None    Years of education: None    Highest education level: None   Occupational History     Employer: [employer]   Tobacco Use    Smoking status: Never    Smokeless tobacco: Never   Vaping Use    Vaping status: Never Used   Substance and Sexual Activity    Alcohol use: Yes     Comment: rare    Drug use: No    Sexual activity: Yes     Partners: Female   Other Topics Concern    Parent/sibling w/ CABG, MI or angioplasty before 65F 55M? No   Social History Narrative    07/26/24        ENVIRONMENTAL HISTORY: [place] The home is heated with a forced air and gas furnace. They does have central air conditioning. The patient's bedroom is furnished with hard naseem in bedroom, allergen mattress cover and allergen pillowcase cover.  Has 2 rabbit  inside the house since Summer 2020. There is no history of cockroach or mice infestation. There are no smokers in the house.  The house does not have a damp basement.              Social Determinants of Health     Financial Resource Strain: Low Risk  (10/16/2023)    Financial Resource Strain     Within the past 12 months, have you or your family members you live with been unable to get utilities (heat, electricity) when it was really needed?: No   Food Insecurity: Low Risk  (10/16/2023)     Food Insecurity     Within the past 12 months, did you worry that your food would run out before you got money to buy more?: No     Within the past 12 months, did the food you bought just not last and you didn t have money to get more?: No   Transportation Needs: Low Risk  (10/16/2023)    Transportation Needs     Within the past 12 months, has lack of transportation kept you from medical appointments, getting your medicines, non-medical meetings or appointments, work, or from getting things that you need?: No   Interpersonal Safety: Low Risk  (10/17/2023)    Interpersonal Safety     Do you feel physically and emotionally safe where you currently live?: Yes     Within the past 12 months, have you been hit, slapped, kicked or otherwise physically hurt by someone?: No     Within the past 12 months, have you been humiliated or emotionally abused in other ways by your partner or ex-partner?: No   Housing Stability: Low Risk  (10/16/2023)    Housing Stability     Do you have housing? : Yes     Are you worried about losing your housing?: No         Review of Systems         Current Outpatient Medications:     atorvastatin (LIPITOR) 10 MG tablet, Take 1 tablet (10 mg) by mouth daily, Disp: 90 tablet, Rfl: 3    azelastine (ASTELIN) 0.1 % nasal spray, Spray 1 spray into both nostrils 2 times daily as needed for rhinitis or allergies, Disp: 90 mL, Rfl: 2    azithromycin (ZITHROMAX) 250 MG tablet, Take one tablet by mouth once daily on Mondays, Wednesdays and Fridays., Disp: 36 tablet, Rfl: 3    budesonide (PULMICORT FLEXHALER) 90 MCG/ACT inhaler, Inhale 2 puffs into the lungs 2 times daily In yellow zone, Disp: 1 each, Rfl: 3    budesonide (PULMICORT) 0.25 MG/2ML neb solution, Mix respule of 0.25mg/2 mL budesonide vial in 8oz normal saline sinus rinse bottle. Irrigate each nostril with half of the bottle once daily., Disp: 360 mL, Rfl: 1    ESOMEPRAZOLE MAGNESIUM PO, Take 20 mg by mouth every morning (before breakfast), Disp:  , Rfl:     Fluticasone Furoate-Vilanterol (BREO ELLIPTA) 50-25 MCG/ACT AEPB, Inhale 1 puff into the lungs daily, Disp: 60 each, Rfl: 3    lisinopril-hydrochlorothiazide (ZESTORETIC) 10-12.5 MG tablet, Take 1 tablet by mouth daily, Disp: 90 tablet, Rfl: 3    metFORMIN (GLUCOPHAGE XR) 500 MG 24 hr tablet, Take 1 tablet (500 mg) by mouth daily (with dinner), Disp: 90 tablet, Rfl: 3    montelukast (SINGULAIR) 10 MG tablet, Take 1 tablet (10 mg) by mouth at bedtime, Disp: 90 tablet, Rfl: 3    Respiratory Therapy Supplies (NEBULIZER/TUBING/MOUTHPIECE) KIT, use with nebulizer, Disp: 1 kit, Rfl: 11    albuterol (VENTOLIN HFA) 108 (90 Base) MCG/ACT inhaler, Inhale 2-4 puffs into the lungs every 4 hours as needed for shortness of breath Take before exercise. (Patient not taking: Reported on 7/26/2024), Disp: 18 g, Rfl: 3    diclofenac (VOLTAREN) 1 % topical gel, Apply 4 g topically 4 times daily as needed for moderate pain (Patient not taking: Reported on 7/26/2024), Disp: 350 g, Rfl: 1    ipratropium - albuterol 0.5 mg/2.5 mg/3 mL (DUONEB) 0.5-2.5 (3) MG/3ML neb solution, Take 1 vial (3 mLs) by nebulization every 6 hours as needed for shortness of breath / dyspnea or wheezing (Patient not taking: Reported on 7/26/2024), Disp: 30 vial, Rfl: 1    triamcinolone (KENALOG) 0.1 % external ointment, Apply sparingly to affected area twice daily as needed not longer than 14 days in a row. Do not apply on face, neck, armpits and groin area. (Patient not taking: Reported on 7/26/2024), Disp: 80 g, Rfl: 1  Immunization History   Administered Date(s) Administered    COVID-19 12+ (2023-24) (Pfizer) 10/17/2023    COVID-19 Bivalent 18+ (Moderna) 12/19/2022    COVID-19 Monovalent Booster 18+ (Moderna) 04/18/2022    Influenza (H1N1) 01/11/2010    Influenza (IIV3) PF 11/02/1998, 12/19/2006, 10/29/2008, 08/26/2009, 10/05/2010, 01/04/2013    Influenza Vaccine 18-64 (Flublok) 10/21/2019, 11/18/2020, 10/14/2021, 10/27/2022, 10/17/2023    Influenza  Vaccine >6 months,quad, PF 10/23/2015, 09/21/2017, 09/06/2018    Influenza Vaccine IM Ages 6-35 Months 4 Valent (PF) 01/30/2014    Pneumococcal 20 valent Conjugate (Prevnar 20) 10/27/2023    Pneumococcal 23 valent 01/29/2016    TD,PF 7+ (Tenivac) 12/02/2004, 04/18/2022    TDAP Vaccine (Adacel) 11/18/2008    Td (Adult), Adsorbed 09/29/1997    Varicella 10/15/2002, 11/11/2002    Zoster recombinant adjuvanted (SHINGRIX) 10/27/2023     Allergies   Allergen Reactions    Seasonal Allergies      Asthma and sinus issues     OBJECTIVE:                                                                 /79 (BP Location: Left arm, Patient Position: Sitting, Cuff Size: Adult Regular)   Pulse 83   Temp 97.3  F (36.3  C) (Tympanic)   Wt 107.6 kg (237 lb 3.2 oz)   SpO2 97%   BMI 35.03 kg/m          Physical Exam  Vitals and nursing note reviewed.   Constitutional:       General: He is not in acute distress.     Appearance: He is obese. He is not diaphoretic.   HENT:      Head: Normocephalic and atraumatic.      Right Ear: Tympanic membrane, ear canal and external ear normal.      Left Ear: Tympanic membrane, ear canal and external ear normal.      Nose: Septal deviation (leftward with septal spur) present. No mucosal edema or rhinorrhea.      Right Turbinates: Not enlarged, swollen or pale.      Left Turbinates: Not enlarged, swollen or pale.      Mouth/Throat:      Lips: Pink.      Mouth: Mucous membranes are moist.      Pharynx: Oropharynx is clear. No pharyngeal swelling, oropharyngeal exudate or posterior oropharyngeal erythema.   Eyes:      General:         Right eye: No discharge.         Left eye: No discharge.      Conjunctiva/sclera: Conjunctivae normal.   Pulmonary:      Effort: Pulmonary effort is normal. No respiratory distress.      Breath sounds: Normal breath sounds and air entry. No stridor, decreased air movement or transmitted upper airway sounds. No decreased breath sounds, wheezing, rhonchi or rales.    Neurological:      Mental Status: He is alert and oriented to person, place, and time.   Psychiatric:         Mood and Affect: Mood normal.         Behavior: Behavior normal.              WORKUP:     SPIROMETRY       FVC 3.77L (83% of predicted).     FEV1 3.15L (92% of predicted).     FEV1/FVC 83%      I have reviewed and interpreted these results. My interpretation:The office spirometry performed today doesn't suggest an obstruction.     Asthma Control Test (ACT) total score: 24            ASSESSMENT/PLAN:           Moderate persistent asthma, uncomplicated    Well-controlled with the current regimen of Wixela 100/50 mcg 1 puff twice daily, montelukast 10 mg by mouth once daily, and albuterol as needed.  Plan:    -Continue montelukast 10 mg by mouth once daily.  -Switch from Wixela to Breo Ellipta 50/25 mcg 1 puff once daily.  -Continue using Pulmicort in the Yellow Zone. Depending on future symptom control, consider transitioning from Breo Ellipta to Pulmicort inhaler in the Green Zone.  -Use albuterol inhaler 2-4 puffs every 4 hours as needed for chest tightness, wheezing, shortness of breath, or persistent cough. Ensure the use of a spacer/chamber device with the albuterol inhaler.    - Fluticasone Furoate-Vilanterol (BREO ELLIPTA) 50-25 MCG/ACT AEPB  Dispense: 60 each; Refill: 3  - montelukast (SINGULAIR) 10 MG tablet  Dispense: 90 tablet; Refill: 3  - BREATHING CAPACITY TEST [96361]    Chronic rhinitis  Other chronic sinusitis  IgM deficiency (H)    Well-managed with the current regimen:  Budesonide 0.25 mg/2 mL in saline irrigations nightly, saline rinses in the morning, azelastine as needed, montelukast 10 mg by mouth once daily,   azithromycin 250 mg by mouth on Mondays, Wednesdays, and Fridays during fall, winter, and spring helps with recurrent sinus infections.  - Continue the current regimen as is.  The patient has declined to switch to intranasal fluticasone and prefers to continue with  budesonide/saline irrigations.    - azelastine (ASTELIN) 0.1 % nasal spray  Dispense: 90 mL; Refill: 2  - budesonide (PULMICORT) 0.25 MG/2ML neb solution  Dispense: 360 mL; Refill: 1  - azithromycin (ZITHROMAX) 250 MG tablet  Dispense: 36 tablet; Refill: 3       Follow up in 3 months or sooner if needed, to check on asthma control.      Thank you for allowing us to participate in the care of this patient. Please feel free to contact us if there are any questions or concerns about the patient.    Disclaimer: This note consists of symbols derived from keyboarding, dictation and/or voice recognition software. As a result, there may be errors in the script that have gone undetected. Please consider this when interpreting information found in this chart.    Consent was obtained from the patient to use an AI documentation tool in the creation of this note.     Jose Angel Leiva MD, FAAAAI, FACAAI  Allergy, Asthma and Immunology     [facility]

## 2024-07-27 ASSESSMENT — ASTHMA QUESTIONNAIRES: ACT_TOTALSCORE: 24

## 2024-10-12 DIAGNOSIS — E78.5 HYPERLIPIDEMIA LDL GOAL <130: ICD-10-CM

## 2024-10-12 DIAGNOSIS — I10 ESSENTIAL HYPERTENSION: ICD-10-CM

## 2024-10-14 RX ORDER — LISINOPRIL AND HYDROCHLOROTHIAZIDE 10; 12.5 MG/1; MG/1
1 TABLET ORAL DAILY
Qty: 90 TABLET | Refills: 0 | Status: SHIPPED | OUTPATIENT
Start: 2024-10-14

## 2024-10-14 RX ORDER — ATORVASTATIN CALCIUM 10 MG/1
10 TABLET, FILM COATED ORAL DAILY
Qty: 90 TABLET | Refills: 0 | Status: SHIPPED | OUTPATIENT
Start: 2024-10-14

## 2024-10-14 NOTE — TELEPHONE ENCOUNTER
LDL Cholesterol Calculated   Date Value Ref Range Status   10/17/2023 99 <=100 mg/dL Final   10/31/2019 99 <100 mg/dL Final     Comment:     Desirable:       <100 mg/dl     GFR Estimate   Date Value Ref Range Status   10/17/2023 72 >60 mL/min/1.73m2 Final   10/31/2019 75 >60 mL/min/[1.73_m2] Final     Comment:     Non  GFR Calc  Starting 12/18/2018, serum creatinine based estimated GFR (eGFR) will be   calculated using the Chronic Kidney Disease Epidemiology Collaboration   (CKD-EPI) equation.

## 2024-10-31 ASSESSMENT — ASTHMA QUESTIONNAIRES
QUESTION_2 LAST FOUR WEEKS HOW OFTEN HAVE YOU HAD SHORTNESS OF BREATH: NOT AT ALL
QUESTION_1 LAST FOUR WEEKS HOW MUCH OF THE TIME DID YOUR ASTHMA KEEP YOU FROM GETTING AS MUCH DONE AT WORK, SCHOOL OR AT HOME: NONE OF THE TIME
QUESTION_4 LAST FOUR WEEKS HOW OFTEN HAVE YOU USED YOUR RESCUE INHALER OR NEBULIZER MEDICATION (SUCH AS ALBUTEROL): NOT AT ALL
ACT_TOTALSCORE: 25
QUESTION_5 LAST FOUR WEEKS HOW WOULD YOU RATE YOUR ASTHMA CONTROL: COMPLETELY CONTROLLED
QUESTION_3 LAST FOUR WEEKS HOW OFTEN DID YOUR ASTHMA SYMPTOMS (WHEEZING, COUGHING, SHORTNESS OF BREATH, CHEST TIGHTNESS OR PAIN) WAKE YOU UP AT NIGHT OR EARLIER THAN USUAL IN THE MORNING: NOT AT ALL

## 2024-11-01 ENCOUNTER — OFFICE VISIT (OUTPATIENT)
Dept: ALLERGY | Facility: CLINIC | Age: 64
End: 2024-11-01
Payer: COMMERCIAL

## 2024-11-01 VITALS
BODY MASS INDEX: 34.6 KG/M2 | TEMPERATURE: 97.1 F | HEART RATE: 89 BPM | SYSTOLIC BLOOD PRESSURE: 122 MMHG | OXYGEN SATURATION: 98 % | HEIGHT: 69 IN | DIASTOLIC BLOOD PRESSURE: 80 MMHG | WEIGHT: 233.6 LBS

## 2024-11-01 DIAGNOSIS — J31.0 CHRONIC RHINITIS: ICD-10-CM

## 2024-11-01 DIAGNOSIS — Z23 NEED FOR PROPHYLACTIC VACCINATION AND INOCULATION AGAINST INFLUENZA: ICD-10-CM

## 2024-11-01 DIAGNOSIS — D80.4 IGM DEFICIENCY (H): ICD-10-CM

## 2024-11-01 DIAGNOSIS — J45.40 MODERATE PERSISTENT ASTHMA, UNCOMPLICATED: Primary | ICD-10-CM

## 2024-11-01 DIAGNOSIS — J32.8 OTHER CHRONIC SINUSITIS: ICD-10-CM

## 2024-11-01 LAB
FEF 25/75: NORMAL
FEV-1: NORMAL
FEV1/FVC: NORMAL
FVC: NORMAL

## 2024-11-01 PROCEDURE — 99214 OFFICE O/P EST MOD 30 MIN: CPT | Mod: 25 | Performed by: ALLERGY & IMMUNOLOGY

## 2024-11-01 PROCEDURE — 90471 IMMUNIZATION ADMIN: CPT | Performed by: ALLERGY & IMMUNOLOGY

## 2024-11-01 PROCEDURE — 90673 RIV3 VACCINE NO PRESERV IM: CPT | Performed by: ALLERGY & IMMUNOLOGY

## 2024-11-01 PROCEDURE — 94010 BREATHING CAPACITY TEST: CPT | Performed by: ALLERGY & IMMUNOLOGY

## 2024-11-01 RX ORDER — FLUTICASONE FUROATE AND VILANTEROL TRIFENATATE 50; 25 UG/1; UG/1
1 POWDER RESPIRATORY (INHALATION) DAILY
Qty: 180 EACH | Refills: 3 | Status: SHIPPED | OUTPATIENT
Start: 2024-11-01

## 2024-11-01 RX ORDER — ALBUTEROL SULFATE 90 UG/1
2-4 INHALANT RESPIRATORY (INHALATION) EVERY 4 HOURS PRN
Qty: 18 G | Refills: 3 | Status: SHIPPED | OUTPATIENT
Start: 2024-11-01

## 2024-11-01 RX ORDER — AZITHROMYCIN 250 MG/1
TABLET, FILM COATED ORAL
Qty: 36 TABLET | Refills: 3 | Status: SHIPPED | OUTPATIENT
Start: 2024-11-01

## 2024-11-01 RX ORDER — AZELASTINE 1 MG/ML
1 SPRAY, METERED NASAL 2 TIMES DAILY PRN
Qty: 90 ML | Refills: 2 | Status: SHIPPED | OUTPATIENT
Start: 2024-11-01

## 2024-11-01 ASSESSMENT — ASTHMA QUESTIONNAIRES: ACT_TOTALSCORE: 25

## 2024-11-01 NOTE — LETTER
11/1/2024      Markell Ramsey  [address] [id number]      Dear Colleague,    Thank you for referring your patient, Markell Ramsey, to the [facility]. Please see a copy of my visit note below.    SUBJECTIVE:                                                                   Markell Ramsey presents today to our Allergy Clinic at [facility] for a follow up visit. He is a 64 year old male with moderate persistent asthma, allergic rhinitis, recurrent sinus infection/bronchitis and decreased IgM.     November 2017:Normal CH 50. Normal total IgE.  Normal total IgG.  Normal IgG subclasses.   Normal total IgA level. Decreased IgM level on multiple occasions. Normal T and B cells (flow cytometry).  20/22 protective pneumococcal antibody levels. Protective tetanus antibody level.      SPT in 2007 was positive to molds, tree pollen, grass mix, and ragweed. IDs were positive for cat, roaches, dog, mite. Aspergillus mold, and marsh elder. Started by Dr. Whittington on allergen IT in 2007,  [facility] Whittington [facility] to practice before. He received allergen immunotherapy on and off until 2010, which he found partially helpful.   Sinus CT in 2013, with mild-moderate sinus disease, and DNS on the left. The patient confirms nasal congestion L>R.  IMPRESSION:  Mild/moderate mucosal thickening in the paranasal sinuses as described above.  Slight worsening of the right maxillary sinus and worsening of the right frontal sinus since the previous exam, but clearing of the left sphenoid sinus.  Asthma:    Markell cruz asthma remains very well-controlled. In July, we switched from Wixela 100/50 to Breo Ellipta 50/25 mcg, and he has noticed no difference in his symptoms since the switch. He is currently using Breo Ellipta 50/25 mcg, one puff twice daily, along with montelukast 10 mg orally once daily. He uses albuterol as needed and keeps a Pulmicort 90 mcg inhaler  for Yellow Zone management, though he has not needed it in some time.    Markell s albuterol HFA usage is less than twice weekly for occasional chest symptoms, and he wakes up with asthma symptoms less than twice per month. He has had no need for oral steroids since his last visit and has not required any emergency department, PCP, urgent care, or specialist visits for asthma flares. He denies current symptoms of chest tightness, cough, wheezing, or shortness of breath.   Chronic Rhinitis/Chronic Sinusitis/IgM Deficiency:    For his chronic rhinitis and sinusitis, Markell uses budesonide/saline irrigations (0.25 mg/2 mL) nightly and performs saline rinses without steroids each morning. He keeps azelastine on hand for symptom flares but has not needed it since July. Since the beginning of the colder season, he has been taking azithromycin 250 mg on Mondays, Wednesdays, and Fridays.    Markell denies any issues with uncontrolled nasal congestion, postnasal drainage, sneezing, or sinus pressure.    Does have a history of GERD.  Takes esomeprazole over-the-counter.  States that it is well-controlled on this regimen.  Today, he is reluctant to switch it to famotidine.         Patient Active Problem List   Diagnosis     Gastroesophageal reflux disease without esophagitis     Non morbid obesity due to excess calories     IgM deficiency (H)     Moderate persistent asthma, uncomplicated     Essential hypertension     Hyperlipidemia LDL goal <130     DNS (deviated nasal septum)     Other chronic rhinitis     Arthritis of right knee     Type 2 diabetes mellitus without complication, without long-term current use of insulin (H)     Class 2 severe obesity due to excess calories with serious comorbidity in adult (H)       Past Medical History:   Diagnosis Date     Acute bronchitis      Allergic rhinitis, cause unspecified 09/23/2008     Allergic rhinitis, unspecified allergic rhinitis type 09/23/2008     Problem list name updated by  automated process. Provider to review     Arthritis of right knee 10/17/2023     Diabetes mellitus, type 2 (H) 10/27/2023     Esophageal reflux      Hypertension 2014     Pneumonia 02/24/2013    MRSA, required hospitalization     Pre-diabetes      Uncomplicated asthma 1972     Unspecified asthma(493.90)       Problem (# of Occurrences) Relation (Name,Age of Onset)    Allergies (1) Mother: asthma    Asthma (1) Daughter (Shirin)    Cancer (1) Father (Ruslan): lung    Respiratory (3) Sister: asthma, Son (Wisam): Upper Resp infections, Son (David): asthma    Other Cancer (1) Father (Ruslan): Braulio          Past Surgical History:   Procedure Laterality Date     BACK SURGERY  2006    L4 / L5 Discectomy     SURGICAL HISTORY OF -   02/23/2006    L4-5 microdiscectomy     TONSILLECTOMY & ADENOIDECTOMY       ZZC APPENDECTOMY  08/01/2006     Social History     Socioeconomic History     Marital status:      Spouse name: None     Number of children: None     Years of education: None     Highest education level: None   Occupational History     Employer: [employer]   Tobacco Use     Smoking status: Never     Smokeless tobacco: Never   Vaping Use     Vaping status: Never Used   Substance and Sexual Activity     Alcohol use: Yes     Comment: rare     Drug use: No     Sexual activity: Yes     Partners: Female   Other Topics Concern     Parent/sibling w/ CABG, MI or angioplasty before 65F 55M? No   Social History Narrative    11/01/24        ENVIRONMENTAL HISTORY: [place] The home is heated with a forced air and gas furnace. They does have central air conditioning. The patient's bedroom is furnished with hard naseem in bedroom, allergen mattress cover and allergen pillowcase cover.  Has 2 rabbit  inside the house since Summer 2020. There is no history of cockroach or mice infestation. There are no smokers in the house.  The house does not have a damp basement.              Social  Drivers of Health     Financial Resource Strain: Low Risk  (10/16/2023)    Financial Resource Strain      Within the past 12 months, have you or your family members you live with been unable to get utilities (heat, electricity) when it was really needed?: No   Food Insecurity: Low Risk  (10/16/2023)    Food Insecurity      Within the past 12 months, did you worry that your food would run out before you got money to buy more?: No      Within the past 12 months, did the food you bought just not last and you didn t have money to get more?: No   Transportation Needs: Low Risk  (10/16/2023)    Transportation Needs      Within the past 12 months, has lack of transportation kept you from medical appointments, getting your medicines, non-medical meetings or appointments, work, or from getting things that you need?: No   Interpersonal Safety: Low Risk  (10/17/2023)    Interpersonal Safety      Do you feel physically and emotionally safe where you currently live?: Yes      Within the past 12 months, have you been hit, slapped, kicked or otherwise physically hurt by someone?: No      Within the past 12 months, have you been humiliated or emotionally abused in other ways by your partner or ex-partner?: No   Housing Stability: Low Risk  (10/16/2023)    Housing Stability      Do you have housing? : Yes      Are you worried about losing your housing?: No             Current Outpatient Medications:      albuterol (VENTOLIN HFA) 108 (90 Base) MCG/ACT inhaler, Inhale 2-4 puffs into the lungs every 4 hours as needed for shortness of breath. Take before exercise., Disp: 18 g, Rfl: 3     atorvastatin (LIPITOR) 10 MG tablet, TAKE 1 TABLET (10 MG) BY MOUTH DAILY., Disp: 90 tablet, Rfl: 0     azelastine (ASTELIN) 0.1 % nasal spray, Spray 1 spray into both nostrils 2 times daily as needed for rhinitis or allergies., Disp: 90 mL, Rfl: 2     azithromycin (ZITHROMAX) 250 MG tablet, Take one tablet by mouth once daily on Mondays, Wednesdays and  Fridays., Disp: 36 tablet, Rfl: 3     diclofenac (VOLTAREN) 1 % topical gel, Apply 4 g topically 4 times daily as needed for moderate pain, Disp: 350 g, Rfl: 1     ESOMEPRAZOLE MAGNESIUM PO, Take 20 mg by mouth every morning (before breakfast), Disp: , Rfl:      Fluticasone Furoate-Vilanterol (BREO ELLIPTA) 50-25 MCG/ACT AEPB, Inhale 1 puff into the lungs daily., Disp: 180 each, Rfl: 3     ipratropium - albuterol 0.5 mg/2.5 mg/3 mL (DUONEB) 0.5-2.5 (3) MG/3ML neb solution, Take 1 vial (3 mLs) by nebulization every 6 hours as needed for shortness of breath / dyspnea or wheezing, Disp: 30 vial, Rfl: 1     lisinopril-hydrochlorothiazide (ZESTORETIC) 10-12.5 MG tablet, TAKE 1 TABLET BY MOUTH EVERY DAY, Disp: 90 tablet, Rfl: 0     metFORMIN (GLUCOPHAGE XR) 500 MG 24 hr tablet, Take 1 tablet (500 mg) by mouth daily (with dinner), Disp: 90 tablet, Rfl: 3     montelukast (SINGULAIR) 10 MG tablet, Take 1 tablet (10 mg) by mouth at bedtime, Disp: 90 tablet, Rfl: 3     Respiratory Therapy Supplies (NEBULIZER/TUBING/MOUTHPIECE) KIT, use with nebulizer, Disp: 1 kit, Rfl: 11     budesonide (PULMICORT FLEXHALER) 90 MCG/ACT inhaler, Inhale 2 puffs into the lungs 2 times daily In yellow zone (Patient not taking: Reported on 11/1/2024), Disp: 1 each, Rfl: 3     budesonide (PULMICORT) 0.25 MG/2ML neb solution, Mix respule of 0.25mg/2 mL budesonide vial in 8oz normal saline sinus rinse bottle. Irrigate each nostril with half of the bottle once daily. (Patient not taking: Reported on 11/1/2024), Disp: 360 mL, Rfl: 1     triamcinolone (KENALOG) 0.1 % external ointment, Apply sparingly to affected area twice daily as needed not longer than 14 days in a row. Do not apply on face, neck, armpits and groin area. (Patient not taking: Reported on 11/1/2024), Disp: 80 g, Rfl: 1  Immunization History   Administered Date(s) Administered     COVID-19 12+ (Pfizer) 10/17/2023     COVID-19 Bivalent 18+ (Moderna) 12/19/2022     COVID-19 Monovalent  "Booster 18+ (Moderna) 04/18/2022     Influenza (H1N1) 01/11/2010     Influenza (IIV3) PF 11/02/1998, 12/19/2006, 10/29/2008, 08/26/2009, 10/05/2010, 01/04/2013     Influenza Vaccine 18-64 (Flublok) 10/21/2019, 11/18/2020, 10/14/2021, 10/27/2022, 10/17/2023     Influenza Vaccine >6 months,quad, PF 10/23/2015, 09/21/2017, 09/06/2018     Influenza Vaccine IM Ages 6-35 Months 4 Valent (PF) 01/30/2014     Influenza Vaccine Trivalent (FluBlok) 11/01/2024     Pneumococcal 20 valent Conjugate (Prevnar 20) 10/27/2023     Pneumococcal 23 valent 01/29/2016     TD,PF 7+ (Tenivac) 12/02/2004, 04/18/2022     TDAP Vaccine (Adacel) 11/18/2008     Td (Adult), Adsorbed 09/29/1997     Varicella 10/15/2002, 11/11/2002     Zoster recombinant adjuvanted (SHINGRIX) 10/27/2023     Allergies   Allergen Reactions     Seasonal Allergies      Asthma and sinus issues     OBJECTIVE:                                                                 /80 (BP Location: Left arm, Patient Position: Sitting, Cuff Size: Adult Large)   Pulse 89   Temp 97.1  F (36.2  C) (Tympanic)   Ht 1.753 m (5' 9\")   Wt 106 kg (233 lb 9.6 oz)   SpO2 98%   BMI 34.50 kg/m          Physical Exam  Vitals and nursing note reviewed.   Constitutional:       General: He is not in acute distress.     Appearance: He is obese. He is not diaphoretic.   HENT:      Head: Normocephalic and atraumatic.      Right Ear: Tympanic membrane, ear canal and external ear normal.      Left Ear: Tympanic membrane, ear canal and external ear normal.      Nose: Septal deviation (leftward with septal spur) present. No mucosal edema or rhinorrhea.      Right Turbinates: Not enlarged, swollen or pale.      Left Turbinates: Not enlarged, swollen or pale.      Mouth/Throat:      Lips: Pink.      Mouth: Mucous membranes are moist.      Pharynx: Oropharynx is clear. No pharyngeal swelling, oropharyngeal exudate or posterior oropharyngeal erythema.   Eyes:      General:         Right eye: No " discharge.         Left eye: No discharge.      Conjunctiva/sclera: Conjunctivae normal.   Pulmonary:      Effort: Pulmonary effort is normal. No respiratory distress.      Breath sounds: Normal breath sounds and air entry. No stridor, decreased air movement or transmitted upper airway sounds. No decreased breath sounds, wheezing, rhonchi or rales.   Neurological:      Mental Status: He is alert and oriented to person, place, and time.   Psychiatric:         Mood and Affect: Mood normal.         Behavior: Behavior normal.                    WORKUP:     SPIROMETRY       FVC 3.82L (84% of predicted).     FEV1 3.08L (90% of predicted).     FEV1/FVC 81%      I have reviewed and interpreted these results. My interpretation:The office spirometry performed today doesn't suggest an obstruction.  Stable.    Asthma Control Test (ACT) total score: 25       ASSESSMENT/PLAN:      Moderate persistent asthma, uncomplicated  Well-controlled with the current regimen of Breo Ellipta 50/25 mcg 1 puff once daily, montelukast 10 mg by mouth once daily, and albuterol as needed.  Continue as is.    - Spirometry, Breathing Capacity  - Fluticasone Furoate-Vilanterol (BREO ELLIPTA) 50-25 MCG/ACT AEPB  Dispense: 180 each; Refill: 3  - albuterol (VENTOLIN HFA) 108 (90 Base) MCG/ACT inhaler  Dispense: 18 g; Refill: 3    Chronic rhinitis  Other chronic sinusitis  IgM deficiency (H)    Markell cruz chronic rhinitis and sinusitis are well-managed with his current regimen, which includes nightly budesonide (0.25 mg/2 mL) in saline irrigations, morning saline rinses, and azelastine as needed. He also takes montelukast 10 mg daily and azithromycin 250 mg on Mondays, Wednesdays, and Fridays during the colder seasons to help prevent recurrent sinus infections. Given his stable symptoms, the current regimen will be continued as is. Markell has declined a switch to intranasal fluticasone, preferring to continue with his budesonide/saline irrigations for symptom  control.    - azelastine (ASTELIN) 0.1 % nasal spray  Dispense: 90 mL; Refill: 2  - azithromycin (ZITHROMAX) 250 MG tablet  Dispense: 36 tablet; Refill: 3    Need for prophylactic vaccination and inoculation against influenza    - INFLUENZA VACCINE TRIVALENT(FLUBLOK)       Follow up in June-July 2025 or sooner if needed.    Thank you for allowing us to participate in the care of this patient. Please feel free to contact us if there are any questions or concerns about the patient.    Disclaimer: This note consists of symbols derived from keyboarding, dictation and/or voice recognition software. As a result, there may be errors in the script that have gone undetected. Please consider this when interpreting information found in this chart.    Consent was obtained from the patient to use an AI documentation tool in the creation of this note.    Jose Angel Leiva MD, FAAAAI, FACAAI  Allergy, Asthma and Immunology     [facility]        Again, thank you for allowing me to participate in the care of your patient.        Sincerely,        Jose Angel Leiva MD

## 2024-11-01 NOTE — PATIENT INSTRUCTIONS
Prescription Assistance  If you need assistance with your prescriptions (cost, coverage, etc) please contact: [place] [facility] [phone]           If labs have been ordered/completed, please allow 7-14 business days for final interpretation of results to be sent on My Chart, phone or mail. Some lab results can take up to 28 days for results.         Allergy Staff Appt Hours Shot Hours Locations    Physician      Jose Angel Leiva MD         Support Staff      Delphine Barlow MA     Tuesday:   [place] :  [place]: :         :  [place] 7-3     [place]        Tuesday: : : :10        Tuesday: :10        Thursday: 7-3:10     [facility]       Tues & Wed: :10       Thurs: 12:10       Fri:             [facility]  [address] [place] [address]  Appt Line: [phone]        [facility] [place]  [address] [place] [address]  Appt Line: [phone]     Pulmonary Function Scheduling:  [place]: [phone]  [place]: [phone]  [phone]

## 2024-11-01 NOTE — PROGRESS NOTES
SUBJECTIVE:                                                                   Markell Ramsey presents today to our Allergy Clinic at [facility] for a follow up visit. He is a 64 year old male with moderate persistent asthma, allergic rhinitis, recurrent sinus infection/bronchitis and decreased IgM.     November 2017:Normal CH 50. Normal total IgE.  Normal total IgG.  Normal IgG subclasses.   Normal total IgA level. Decreased IgM level on multiple occasions. Normal T and B cells (flow cytometry).  20/22 protective pneumococcal antibody levels. Protective tetanus antibody level.      SPT in 2007 was positive to molds, tree pollen, grass mix, and ragweed. IDs were positive for cat, roaches, dog, mite. Aspergillus mold, and marsh elder. Started by Dr. Whittington on allergen IT in 2007,  [facility] Whittington used to practice before. He received allergen immunotherapy on and off until 2010, which he found partially helpful.   Sinus CT in 2013, with mild-moderate sinus disease, and DNS on the left. The patient confirms nasal congestion L>R.  IMPRESSION:  Mild/moderate mucosal thickening in the paranasal sinuses as described above.  Slight worsening of the right maxillary sinus and worsening of the right frontal sinus since the previous exam, but clearing of the left sphenoid sinus.  Asthma:    Markell cruz asthma remains very well-controlled. In July, we switched from Wixela 100/50 to Breo Ellipta 50/25 mcg, and he has noticed no difference in his symptoms since the switch. He is currently using Breo Ellipta 50/25 mcg, one puff twice daily, along with montelukast 10 mg orally once daily. He uses albuterol as needed and keeps a Pulmicort 90 mcg inhaler for Yellow Zone management, though he has not needed it in some time.    Markell cruz albuterol HFA usage is less than twice weekly for occasional chest symptoms, and he wakes up with asthma symptoms less than twice per month. He has had  no need for oral steroids since his last visit and has not required any emergency department, PCP, urgent care, or specialist visits for asthma flares. He denies current symptoms of chest tightness, cough, wheezing, or shortness of breath.   Chronic Rhinitis/Chronic Sinusitis/IgM Deficiency:    For his chronic rhinitis and sinusitis, Markell uses budesonide/saline irrigations (0.25 mg/2 mL) nightly and performs saline rinses without steroids each morning. He keeps azelastine on hand for symptom flares but has not needed it since July. Since the beginning of the colder season, he has been taking azithromycin 250 mg on Mondays, Wednesdays, and Fridays.    Markell denies any issues with uncontrolled nasal congestion, postnasal drainage, sneezing, or sinus pressure.    Does have a history of GERD.  Takes esomeprazole over-the-counter.  States that it is well-controlled on this regimen.  Today, he is reluctant to switch it to famotidine.         Patient Active Problem List   Diagnosis    Gastroesophageal reflux disease without esophagitis    Non morbid obesity due to excess calories    IgM deficiency (H)    Moderate persistent asthma, uncomplicated    Essential hypertension    Hyperlipidemia LDL goal <130    DNS (deviated nasal septum)    Other chronic rhinitis    Arthritis of right knee    Type 2 diabetes mellitus without complication, without long-term current use of insulin (H)    Class 2 severe obesity due to excess calories with serious comorbidity in adult (H)       Past Medical History:   Diagnosis Date    Acute bronchitis     Allergic rhinitis, cause unspecified 09/23/2008    Allergic rhinitis, unspecified allergic rhinitis type 09/23/2008     Problem list name updated by automated process. Provider to review    Arthritis of right knee 10/17/2023    Diabetes mellitus, type 2 (H) 10/27/2023    Esophageal reflux     Hypertension 2014    Pneumonia 02/24/2013    MRSA, required hospitalization    Pre-diabetes      Uncomplicated asthma 1972    Unspecified asthma(493.90)       Problem (# of Occurrences) Relation (Name,Age of Onset)    Allergies (1) Mother: asthma    Asthma (1) Daughter (Shirin)    Cancer (1) Father (Ruslan): lung    Respiratory (3) Sister: asthma, Son (Wisam): Upper Resp infections, Son (David): asthma    Other Cancer (1) Father (Ruslan): Braulio          Past Surgical History:   Procedure Laterality Date    BACK SURGERY  2006    L4 / L5 Discectomy    SURGICAL HISTORY OF -   02/23/2006    L4-5 microdiscectomy    TONSILLECTOMY & ADENOIDECTOMY      ZZC APPENDECTOMY  08/01/2006     Social History     Socioeconomic History    Marital status:      Spouse name: None    Number of children: None    Years of education: None    Highest education level: None   Occupational History     Employer: [employer]   Tobacco Use    Smoking status: Never    Smokeless tobacco: Never   Vaping Use    Vaping status: Never Used   Substance and Sexual Activity    Alcohol use: Yes     Comment: rare    Drug use: No    Sexual activity: Yes     Partners: Female   Other Topics Concern    Parent/sibling w/ CABG, MI or angioplasty before 65F 55M? No   Social History Narrative    11/01/24        ENVIRONMENTAL HISTORY: [place] The home is heated with a forced air and gas furnace. They does have central air conditioning. The patient's bedroom is furnished with hard naseem in bedroom, allergen mattress cover and allergen pillowcase cover.  Has 2 rabbit  inside the house since Summer 2020. There is no history of cockroach or mice infestation. There are no smokers in the house.  The house does not have a damp basement.              Social Drivers of Health     Financial Resource Strain: Low Risk  (10/16/2023)    Financial Resource Strain     Within the past 12 months, have you or your family members you live with been unable to get utilities (heat, electricity) when it was really needed?: No   Food  Insecurity: Low Risk  (10/16/2023)    Food Insecurity     Within the past 12 months, did you worry that your food would run out before you got money to buy more?: No     Within the past 12 months, did the food you bought just not last and you didn t have money to get more?: No   Transportation Needs: Low Risk  (10/16/2023)    Transportation Needs     Within the past 12 months, has lack of transportation kept you from medical appointments, getting your medicines, non-medical meetings or appointments, work, or from getting things that you need?: No   Interpersonal Safety: Low Risk  (10/17/2023)    Interpersonal Safety     Do you feel physically and emotionally safe where you currently live?: Yes     Within the past 12 months, have you been hit, slapped, kicked or otherwise physically hurt by someone?: No     Within the past 12 months, have you been humiliated or emotionally abused in other ways by your partner or ex-partner?: No   Housing Stability: Low Risk  (10/16/2023)    Housing Stability     Do you have housing? : Yes     Are you worried about losing your housing?: No             Current Outpatient Medications:     albuterol (VENTOLIN HFA) 108 (90 Base) MCG/ACT inhaler, Inhale 2-4 puffs into the lungs every 4 hours as needed for shortness of breath. Take before exercise., Disp: 18 g, Rfl: 3    atorvastatin (LIPITOR) 10 MG tablet, TAKE 1 TABLET (10 MG) BY MOUTH DAILY., Disp: 90 tablet, Rfl: 0    azelastine (ASTELIN) 0.1 % nasal spray, Spray 1 spray into both nostrils 2 times daily as needed for rhinitis or allergies., Disp: 90 mL, Rfl: 2    azithromycin (ZITHROMAX) 250 MG tablet, Take one tablet by mouth once daily on Mondays, Wednesdays and Fridays., Disp: 36 tablet, Rfl: 3    diclofenac (VOLTAREN) 1 % topical gel, Apply 4 g topically 4 times daily as needed for moderate pain, Disp: 350 g, Rfl: 1    ESOMEPRAZOLE MAGNESIUM PO, Take 20 mg by mouth every morning (before breakfast), Disp: , Rfl:     Fluticasone  Furoate-Vilanterol (BREO ELLIPTA) 50-25 MCG/ACT AEPB, Inhale 1 puff into the lungs daily., Disp: 180 each, Rfl: 3    ipratropium - albuterol 0.5 mg/2.5 mg/3 mL (DUONEB) 0.5-2.5 (3) MG/3ML neb solution, Take 1 vial (3 mLs) by nebulization every 6 hours as needed for shortness of breath / dyspnea or wheezing, Disp: 30 vial, Rfl: 1    lisinopril-hydrochlorothiazide (ZESTORETIC) 10-12.5 MG tablet, TAKE 1 TABLET BY MOUTH EVERY DAY, Disp: 90 tablet, Rfl: 0    metFORMIN (GLUCOPHAGE XR) 500 MG 24 hr tablet, Take 1 tablet (500 mg) by mouth daily (with dinner), Disp: 90 tablet, Rfl: 3    montelukast (SINGULAIR) 10 MG tablet, Take 1 tablet (10 mg) by mouth at bedtime, Disp: 90 tablet, Rfl: 3    Respiratory Therapy Supplies (NEBULIZER/TUBING/MOUTHPIECE) KIT, use with nebulizer, Disp: 1 kit, Rfl: 11    budesonide (PULMICORT FLEXHALER) 90 MCG/ACT inhaler, Inhale 2 puffs into the lungs 2 times daily In yellow zone (Patient not taking: Reported on 11/1/2024), Disp: 1 each, Rfl: 3    budesonide (PULMICORT) 0.25 MG/2ML neb solution, Mix respule of 0.25mg/2 mL budesonide vial in 8oz normal saline sinus rinse bottle. Irrigate each nostril with half of the bottle once daily. (Patient not taking: Reported on 11/1/2024), Disp: 360 mL, Rfl: 1    triamcinolone (KENALOG) 0.1 % external ointment, Apply sparingly to affected area twice daily as needed not longer than 14 days in a row. Do not apply on face, neck, armpits and groin area. (Patient not taking: Reported on 11/1/2024), Disp: 80 g, Rfl: 1  Immunization History   Administered Date(s) Administered    COVID-19 12+ (Pfizer) 10/17/2023    COVID-19 Bivalent 18+ (Moderna) 12/19/2022    COVID-19 Monovalent Booster 18+ (Moderna) 04/18/2022    Influenza (H1N1) 01/11/2010    Influenza (IIV3) PF 11/02/1998, 12/19/2006, 10/29/2008, 08/26/2009, 10/05/2010, 01/04/2013    Influenza Vaccine 18-64 (Flublok) 10/21/2019, 11/18/2020, 10/14/2021, 10/27/2022, 10/17/2023    Influenza Vaccine >6 months,quad,  "PF 10/23/2015, 09/21/2017, 09/06/2018    Influenza Vaccine IM Ages 6-35 Months 4 Valent (PF) 01/30/2014    Influenza Vaccine Trivalent (FluBlok) 11/01/2024    Pneumococcal 20 valent Conjugate (Prevnar 20) 10/27/2023    Pneumococcal 23 valent 01/29/2016    TD,PF 7+ (Tenivac) 12/02/2004, 04/18/2022    TDAP Vaccine (Adacel) 11/18/2008    Td (Adult), Adsorbed 09/29/1997    Varicella 10/15/2002, 11/11/2002    Zoster recombinant adjuvanted (SHINGRIX) 10/27/2023     Allergies   Allergen Reactions    Seasonal Allergies      Asthma and sinus issues     OBJECTIVE:                                                                 /80 (BP Location: Left arm, Patient Position: Sitting, Cuff Size: Adult Large)   Pulse 89   Temp 97.1  F (36.2  C) (Tympanic)   Ht 1.753 m (5' 9\")   Wt 106 kg (233 lb 9.6 oz)   SpO2 98%   BMI 34.50 kg/m          Physical Exam  Vitals and nursing note reviewed.   Constitutional:       General: He is not in acute distress.     Appearance: He is obese. He is not diaphoretic.   HENT:      Head: Normocephalic and atraumatic.      Right Ear: Tympanic membrane, ear canal and external ear normal.      Left Ear: Tympanic membrane, ear canal and external ear normal.      Nose: Septal deviation (leftward with septal spur) present. No mucosal edema or rhinorrhea.      Right Turbinates: Not enlarged, swollen or pale.      Left Turbinates: Not enlarged, swollen or pale.      Mouth/Throat:      Lips: Pink.      Mouth: Mucous membranes are moist.      Pharynx: Oropharynx is clear. No pharyngeal swelling, oropharyngeal exudate or posterior oropharyngeal erythema.   Eyes:      General:         Right eye: No discharge.         Left eye: No discharge.      Conjunctiva/sclera: Conjunctivae normal.   Pulmonary:      Effort: Pulmonary effort is normal. No respiratory distress.      Breath sounds: Normal breath sounds and air entry. No stridor, decreased air movement or transmitted upper airway sounds. No decreased " breath sounds, wheezing, rhonchi or rales.   Neurological:      Mental Status: He is alert and oriented to person, place, and time.   Psychiatric:         Mood and Affect: Mood normal.         Behavior: Behavior normal.                    WORKUP:     SPIROMETRY       FVC 3.82L (84% of predicted).     FEV1 3.08L (90% of predicted).     FEV1/FVC 81%      I have reviewed and interpreted these results. My interpretation:The office spirometry performed today doesn't suggest an obstruction.  Stable.    Asthma Control Test (ACT) total score: 25       ASSESSMENT/PLAN:      Moderate persistent asthma, uncomplicated  Well-controlled with the current regimen of Breo Ellipta 50/25 mcg 1 puff once daily, montelukast 10 mg by mouth once daily, and albuterol as needed.  Continue as is.    - Spirometry, Breathing Capacity  - Fluticasone Furoate-Vilanterol (BREO ELLIPTA) 50-25 MCG/ACT AEPB  Dispense: 180 each; Refill: 3  - albuterol (VENTOLIN HFA) 108 (90 Base) MCG/ACT inhaler  Dispense: 18 g; Refill: 3    Chronic rhinitis  Other chronic sinusitis  IgM deficiency (H)    Markell s chronic rhinitis and sinusitis are well-managed with his current regimen, which includes nightly budesonide (0.25 mg/2 mL) in saline irrigations, morning saline rinses, and azelastine as needed. He also takes montelukast 10 mg daily and azithromycin 250 mg on Mondays, Wednesdays, and Fridays during the colder seasons to help prevent recurrent sinus infections. Given his stable symptoms, the current regimen will be continued as is. Markell has declined a switch to intranasal fluticasone, preferring to continue with his budesonide/saline irrigations for symptom control.    - azelastine (ASTELIN) 0.1 % nasal spray  Dispense: 90 mL; Refill: 2  - azithromycin (ZITHROMAX) 250 MG tablet  Dispense: 36 tablet; Refill: 3    Need for prophylactic vaccination and inoculation against influenza    - INFLUENZA VACCINE TRIVALENT(FLUBLOK)       Follow up in June-July 2025 or  sooner if needed.    Thank you for allowing us to participate in the care of this patient. Please feel free to contact us if there are any questions or concerns about the patient.    Disclaimer: This note consists of symbols derived from keyboarding, dictation and/or voice recognition software. As a result, there may be errors in the script that have gone undetected. Please consider this when interpreting information found in this chart.    Consent was obtained from the patient to use an AI documentation tool in the creation of this note.    Jose Angel Leiva MD, FAAAAI, FACAAI  Allergy, Asthma and Immunology     [facility]

## 2024-11-01 NOTE — RESULT ENCOUNTER NOTE
I have reviewed and interpreted these results. My interpretation:The office spirometry performed today doesn't suggest an obstruction.  Stable.

## 2024-11-10 ENCOUNTER — HEALTH MAINTENANCE LETTER (OUTPATIENT)
Age: 64
End: 2024-11-10

## 2024-12-17 ENCOUNTER — OFFICE VISIT (OUTPATIENT)
Dept: FAMILY MEDICINE | Facility: CLINIC | Age: 64
End: 2024-12-17
Payer: COMMERCIAL

## 2024-12-17 VITALS
OXYGEN SATURATION: 97 % | DIASTOLIC BLOOD PRESSURE: 84 MMHG | BODY MASS INDEX: 34.82 KG/M2 | WEIGHT: 235.1 LBS | RESPIRATION RATE: 20 BRPM | HEIGHT: 69 IN | HEART RATE: 93 BPM | TEMPERATURE: 98 F | SYSTOLIC BLOOD PRESSURE: 126 MMHG

## 2024-12-17 DIAGNOSIS — E78.5 HYPERLIPIDEMIA LDL GOAL <130: ICD-10-CM

## 2024-12-17 DIAGNOSIS — E11.9 TYPE 2 DIABETES MELLITUS WITHOUT COMPLICATION, WITHOUT LONG-TERM CURRENT USE OF INSULIN (H): ICD-10-CM

## 2024-12-17 DIAGNOSIS — M17.11 ARTHRITIS OF RIGHT KNEE: ICD-10-CM

## 2024-12-17 DIAGNOSIS — I10 ESSENTIAL HYPERTENSION: ICD-10-CM

## 2024-12-17 DIAGNOSIS — Z23 NEED FOR VACCINATION: ICD-10-CM

## 2024-12-17 DIAGNOSIS — Z00.00 ROUTINE GENERAL MEDICAL EXAMINATION AT A HEALTH CARE FACILITY: Primary | ICD-10-CM

## 2024-12-17 DIAGNOSIS — Z12.11 SCREEN FOR COLON CANCER: ICD-10-CM

## 2024-12-17 DIAGNOSIS — Z12.5 SCREENING FOR PROSTATE CANCER: ICD-10-CM

## 2024-12-17 LAB
ANION GAP SERPL CALCULATED.3IONS-SCNC: 11 MMOL/L (ref 7–15)
BUN SERPL-MCNC: 14.3 MG/DL (ref 8–23)
CALCIUM SERPL-MCNC: 9.4 MG/DL (ref 8.8–10.4)
CHLORIDE SERPL-SCNC: 100 MMOL/L (ref 98–107)
CHOLEST SERPL-MCNC: 180 MG/DL
CREAT SERPL-MCNC: 1.11 MG/DL (ref 0.67–1.17)
CREAT UR-MCNC: 94.8 MG/DL
EGFRCR SERPLBLD CKD-EPI 2021: 75 ML/MIN/1.73M2
EST. AVERAGE GLUCOSE BLD GHB EST-MCNC: 160 MG/DL
FASTING STATUS PATIENT QL REPORTED: YES
FASTING STATUS PATIENT QL REPORTED: YES
GLUCOSE SERPL-MCNC: 168 MG/DL (ref 70–99)
HBA1C MFR BLD: 7.2 % (ref 0–5.6)
HCO3 SERPL-SCNC: 28 MMOL/L (ref 22–29)
HDLC SERPL-MCNC: 42 MG/DL
LDLC SERPL CALC-MCNC: 91 MG/DL
MICROALBUMIN UR-MCNC: <12 MG/L
MICROALBUMIN/CREAT UR: NORMAL MG/G{CREAT}
NONHDLC SERPL-MCNC: 138 MG/DL
POTASSIUM SERPL-SCNC: 4.1 MMOL/L (ref 3.4–5.3)
PSA SERPL DL<=0.01 NG/ML-MCNC: 1.11 NG/ML (ref 0–4.5)
SODIUM SERPL-SCNC: 139 MMOL/L (ref 135–145)
TRIGL SERPL-MCNC: 236 MG/DL

## 2024-12-17 PROCEDURE — 83036 HEMOGLOBIN GLYCOSYLATED A1C: CPT | Performed by: STUDENT IN AN ORGANIZED HEALTH CARE EDUCATION/TRAINING PROGRAM

## 2024-12-17 PROCEDURE — 99214 OFFICE O/P EST MOD 30 MIN: CPT | Mod: 25 | Performed by: STUDENT IN AN ORGANIZED HEALTH CARE EDUCATION/TRAINING PROGRAM

## 2024-12-17 PROCEDURE — 36415 COLL VENOUS BLD VENIPUNCTURE: CPT | Performed by: STUDENT IN AN ORGANIZED HEALTH CARE EDUCATION/TRAINING PROGRAM

## 2024-12-17 PROCEDURE — G0103 PSA SCREENING: HCPCS | Performed by: STUDENT IN AN ORGANIZED HEALTH CARE EDUCATION/TRAINING PROGRAM

## 2024-12-17 PROCEDURE — 80048 BASIC METABOLIC PNL TOTAL CA: CPT | Performed by: STUDENT IN AN ORGANIZED HEALTH CARE EDUCATION/TRAINING PROGRAM

## 2024-12-17 PROCEDURE — 90480 ADMN SARSCOV2 VAC 1/ONLY CMP: CPT | Performed by: STUDENT IN AN ORGANIZED HEALTH CARE EDUCATION/TRAINING PROGRAM

## 2024-12-17 PROCEDURE — 82570 ASSAY OF URINE CREATININE: CPT | Performed by: STUDENT IN AN ORGANIZED HEALTH CARE EDUCATION/TRAINING PROGRAM

## 2024-12-17 PROCEDURE — 91320 SARSCV2 VAC 30MCG TRS-SUC IM: CPT | Performed by: STUDENT IN AN ORGANIZED HEALTH CARE EDUCATION/TRAINING PROGRAM

## 2024-12-17 PROCEDURE — 80061 LIPID PANEL: CPT | Performed by: STUDENT IN AN ORGANIZED HEALTH CARE EDUCATION/TRAINING PROGRAM

## 2024-12-17 PROCEDURE — 99396 PREV VISIT EST AGE 40-64: CPT | Performed by: STUDENT IN AN ORGANIZED HEALTH CARE EDUCATION/TRAINING PROGRAM

## 2024-12-17 PROCEDURE — 82043 UR ALBUMIN QUANTITATIVE: CPT | Performed by: STUDENT IN AN ORGANIZED HEALTH CARE EDUCATION/TRAINING PROGRAM

## 2024-12-17 RX ORDER — CETIRIZINE HYDROCHLORIDE 10 MG/1
10 TABLET ORAL PRN
COMMUNITY

## 2024-12-17 RX ORDER — LISINOPRIL AND HYDROCHLOROTHIAZIDE 10; 12.5 MG/1; MG/1
1 TABLET ORAL DAILY
Qty: 90 TABLET | Refills: 0 | Status: SHIPPED | OUTPATIENT
Start: 2024-12-17

## 2024-12-17 RX ORDER — METFORMIN HYDROCHLORIDE 500 MG/1
1000 TABLET, EXTENDED RELEASE ORAL
Qty: 180 TABLET | Refills: 3 | Status: SHIPPED | OUTPATIENT
Start: 2024-12-17

## 2024-12-17 RX ORDER — METFORMIN HYDROCHLORIDE 500 MG/1
500 TABLET, EXTENDED RELEASE ORAL
Qty: 90 TABLET | Refills: 0 | Status: SHIPPED | OUTPATIENT
Start: 2024-12-17 | End: 2024-12-17

## 2024-12-17 RX ORDER — ATORVASTATIN CALCIUM 40 MG/1
40 TABLET, FILM COATED ORAL DAILY
Qty: 90 TABLET | Refills: 3 | Status: SHIPPED | OUTPATIENT
Start: 2024-12-17

## 2024-12-17 RX ORDER — ATORVASTATIN CALCIUM 10 MG/1
10 TABLET, FILM COATED ORAL DAILY
Qty: 90 TABLET | Refills: 0 | Status: SHIPPED | OUTPATIENT
Start: 2024-12-17 | End: 2024-12-17

## 2024-12-17 SDOH — HEALTH STABILITY: PHYSICAL HEALTH: ON AVERAGE, HOW MANY DAYS PER WEEK DO YOU ENGAGE IN MODERATE TO STRENUOUS EXERCISE (LIKE A BRISK WALK)?: 1 DAY

## 2024-12-17 ASSESSMENT — PAIN SCALES - GENERAL: PAINLEVEL_OUTOF10: MILD PAIN (2)

## 2024-12-17 ASSESSMENT — SOCIAL DETERMINANTS OF HEALTH (SDOH): HOW OFTEN DO YOU GET TOGETHER WITH FRIENDS OR RELATIVES?: TWICE A WEEK

## 2024-12-17 NOTE — NURSING NOTE
Immunizations Administered       Name Date Dose VIS Date Route    COVID-19 12+ (Pfizer) 12/17/24  8:27 AM 0.3 mL EUI,10/17/2024,Given today Intramuscular          Patient presents requesting Covid-19 vaccination.  Standing orders implemented. Patient is remaining in clinic for 15 minutes to monitor for adverse reactions.    Vaccination given by Kristin CARDENAS LPN on 12/17/2024 at 8:30 AM

## 2024-12-17 NOTE — PATIENT INSTRUCTIONS
Hello! It was a pleasure seeing you today. Just some things we discussed at today's visit:     Diabetes    Try to limit your dietary intake of carbohydrate rich foods like rice, pasta, breads, potatoes, sweets.  Additionally, limit your intake of drinks with added sugars, such as juice, regular soda, and regular sports or energy drinks.  It is ok for you to have lean meats, chicken, turkey, fish, eggs, nuts, beans, lentils, and tofu.   With regards to exercise, try to get at least 30 minutes of CONTINUOUS aerobic exercise at least 3 times per week.       Sincerely,     Swathi Jin MD          Patient Education   Preventive Care Advice   This is general advice given by our system to help you stay healthy. However, your care team may have specific advice just for you. Please talk to your care team about your preventive care needs.  Nutrition  Eat 5 or more servings of fruits and vegetables each day.  Try wheat bread, brown rice and whole grain pasta (instead of white bread, rice, and pasta).  Get enough calcium and vitamin D. Check the label on foods and aim for 100% of the RDA (recommended daily allowance).  Lifestyle  Exercise at least 150 minutes each week  (30 minutes a day, 5 days a week).  Do muscle strengthening activities 2 days a week. These help control your weight and prevent disease.  No smoking.  Wear sunscreen to prevent skin cancer.  Have a dental exam and cleaning every 6 months.  Yearly exams  See your health care team every year to talk about:  Any changes in your health.  Any medicines your care team has prescribed.  Preventive care, family planning, and ways to prevent chronic diseases.  Shots (vaccines)   HPV shots (up to age 26), if you've never had them before.  Hepatitis B shots (up to age 59), if you've never had them before.  COVID-19 shot: Get this shot when it's due.  Flu shot: Get a flu shot every year.  Tetanus shot: Get a tetanus shot every 10 years.  Pneumococcal, hepatitis A, and RSV  shots: Ask your care team if you need these based on your risk.  Shingles shot (for age 50 and up)  General health tests  Diabetes screening:  Starting at age 35, Get screened for diabetes at least every 3 years.  If you are younger than age 35, ask your care team if you should be screened for diabetes.  Cholesterol test: At age 39, start having a cholesterol test every 5 years, or more often if advised.  Bone density scan (DEXA): At age 50, ask your care team if you should have this scan for osteoporosis (brittle bones).  Hepatitis C: Get tested at least once in your life.  STIs (sexually transmitted infections)  Before age 24: Ask your care team if you should be screened for STIs.  After age 24: Get screened for STIs if you're at risk. You are at risk for STIs (including HIV) if:  You are sexually active with more than one person.  You don't use condoms every time.  You or a partner was diagnosed with a sexually transmitted infection.  If you are at risk for HIV, ask about PrEP medicine to prevent HIV.  Get tested for HIV at least once in your life, whether you are at risk for HIV or not.  Cancer screening tests  Cervical cancer screening: If you have a cervix, begin getting regular cervical cancer screening tests starting at age 21.  Breast cancer scan (mammogram): If you've ever had breasts, begin having regular mammograms starting at age 40. This is a scan to check for breast cancer.  Colon cancer screening: It is important to start screening for colon cancer at age 45.  Have a colonoscopy test every 10 years (or more often if you're at risk) Or, ask your provider about stool tests like a FIT test every year or Cologuard test every 3 years.  To learn more about your testing options, visit:   .  For help making a decision, visit:   https://bit.ly/cs08668.  Prostate cancer screening test: If you have a prostate, ask your care team if a prostate cancer screening test (PSA) at age 55 is right for you.  Lung cancer  screening: If you are a current or former smoker ages 50 to 80, ask your care team if ongoing lung cancer screenings are right for you.  For informational purposes only. Not to replace the advice of your health care provider. Copyright   2023 [facility] [employer]. All rights reserved. Clinically reviewed by the [facility] Transitions Program. [employer] [id number] - REV 01/24.  Learning About Stress  What is stress?     Stress is your body's response to a hard situation. Your body can have a physical, emotional, or mental response. Stress is a fact of life for most people, and it affects everyone differently. What causes stress for you may not be stressful for someone else.  A lot of things can cause stress. You may feel stress when you go on a job interview, take a test, or run a race. This kind of short-term stress is normal and even useful. It can help you if you need to work hard or react quickly. For example, stress can help you finish an important job on time.  Long-term stress is caused by ongoing stressful situations or events. Examples of long-term stress include long-term health problems, ongoing problems at work, or conflicts in your family. Long-term stress can harm your health.  How does stress affect your health?  When you are stressed, your body responds as though you are in danger. It makes hormones that speed up your heart, make you breathe faster, and give you a burst of energy. This is called the fight-or-flight stress response. If the stress is over quickly, your body goes back to normal and no harm is done.  But if stress happens too often or lasts too long, it can have bad effects. Long-term stress can make you more likely to get sick, and it can make symptoms of some diseases worse. If you tense up when you are stressed, you may develop neck, shoulder, or low back pain. Stress is linked to high blood pressure and heart disease.  Stress also harms your emotional health. It can make you  tracy, tense, or depressed. Your relationships may suffer, and you may not do well at work or school.  What can you do to manage stress?  You can try these things to help manage stress:   Do something active. Exercise or activity can help reduce stress. Walking is a great way to get started. Even everyday activities such as housecleaning or yard work can help.  Try yoga or lenny chi. These techniques combine exercise and meditation. You may need some training at first to learn them.  Do something you enjoy. For example, listen to music or go to a movie. Practice your hobby or do volunteer work.  Meditate. This can help you relax, because you are not worrying about what happened before or what may happen in the future.  Do guided imagery. Imagine yourself in any setting that helps you feel calm. You can use online videos, books, or a teacher to guide you.  Do breathing exercises. For example:  From a standing position, bend forward from the waist with your knees slightly bent. Let your arms dangle close to the floor.  Breathe in slowly and deeply as you return to a standing position. Roll up slowly and lift your head last.  Hold your breath for just a few seconds in the standing position.  Breathe out slowly and bend forward from the waist.  Let your feelings out. Talk, laugh, cry, and express anger when you need to. Talking with supportive friends or family, a counselor, or a augustnia leader about your feelings is a healthy way to relieve stress. Avoid discussing your feelings with people who make you feel worse.  Write. It may help to write about things that are bothering you. This helps you find out how much stress you feel and what is causing it. When you know this, you can find better ways to cope.  What can you do to prevent stress?  You might try some of these things to help prevent stress:  Manage your time. This helps you find time to do the things you want and need to do.  Get enough sleep. Your body recovers  "from the stresses of the day while you are sleeping.  Get support. Your family, friends, and community can make a difference in how you experience stress.  Limit your news feed. Avoid or limit time on social media or news that may make you feel stressed.  Do something active. Exercise or activity can help reduce stress. Walking is a great way to get started.  Where can you learn more?  Go to https://www.[employer].net/patiented  Enter N032 in the search box to learn more about \"Learning About Stress.\"  Current as of: October 24, 2023  Content Version: 14.2 2024 [facility] [employer].   Care instructions adapted under license by your healthcare professional. If you have questions about a medical condition or this instruction, always ask your healthcare professional. [employer] disclaims any warranty or liability for your use of this information.       "

## 2024-12-17 NOTE — RESULT ENCOUNTER NOTE
Adele Ramsey,     It is a pleasure providing you with medical care. I have received and reviewed your results, and have the following recommendations:     Based on the results of your blood work, your urine albumin was within normal limits.  Your PSA was within normal limits.  You are not suspected having prostate cancer at this time.    Your basic metabolic panel for the most part was within normal limits other than an elevated glucose level.  Which brings me to the results of your A1c.    Your A1c is elevated compared to past values.  Because of this, I will plan to increase your metformin from 500 mg to 1000 mg.  Additionally, it is very important that you incorporate the following lifestyle changes: Try to limit your dietary intake of carbohydrate rich foods like rice, pasta, breads, potatoes, sweets. Additionally, limit your intake of drinks with added sugars, such as juice, regular soda, and regular sports or energy drinks.  It is ok for you to have lean meats, chicken, turkey, fish, eggs, nuts, beans, lentils, and tofu. With regards to exercise, try to get at least 30 minutes of CONTINUOUS aerobic exercise at least 3 times per week.     Because of your elevated A1c compared to past, please make an appointment with the lab for repeat blood draw in 3 months to reassess your diabetes.      Finally, based on the results of your lipid panel, you do have elevated cholesterol.  I do see that you are prescribed Lipitor 10 mg, however given your worsening diabetes, I think it would be best if we increase this dose from 10 mg to 40 mg at night.  If you start to experience muscle aches on the 40 mg, please notify the clinic and decrease the dose down to 20 mg.  If on the 20 mg you still continue to have muscle aches, then you can go down to the 10 mg tablet.    Just like with your A1c, we will reassess your lipid panel in 3 months.  Please make sure you are fasting for at least 8 hours prior to that blood  work.    Sincerely,     Swathi Jin MD

## 2024-12-17 NOTE — PROGRESS NOTES
"Preventive Care Visit  [facility]  LARISSA KOLB MD, Family Medicine  Dec 17, 2024      Assessment & Plan     Routine general medical examination at a health care facility  Need for vaccination  - given today: COVID-19 12+ (PFIZER)    Screen for colon cancer  - Fecal colorectal cancer screen FIT - Future (S+30); Future    Screening for prostate cancer  - PSA, screen; Future    Hyperlipidemia LDL goal <130  - refilled atorvastatin (LIPITOR) 10 MG tablet; Take 1 tablet (10 mg) by mouth daily.  - Lipid panel reflex to direct LDL Non-fasting; Future    Essential hypertension, currently at goal less than 140/90  - BASIC METABOLIC PANEL; Future  -Refilled lisinopril-hydrochlorothiazide (ZESTORETIC) 10-12.5 MG tablet; Take 1 tablet by mouth daily.    Type 2 diabetes mellitus without complication, without long-term current use of insulin (H)  - HEMOGLOBIN A1C; Future  - Albumin Random Urine Quantitative with Creat Ratio; Future  - OPTOMETRY REFERRAL; Future  - metFORMIN (GLUCOPHAGE XR) 500 MG 24 hr tablet; Take 1 tablet (500 mg) by mouth daily (with dinner).    Arthritis of right knee  > reviewed past x-rays, findings were consistent with arthritis patient is agreeable to physical therapy referral  - Physical Therapy  Referral; Future    Patient has been advised of split billing requirements and indicates understanding: Yes        BMI  Estimated body mass index is 34.66 kg/m  as calculated from the following:    Height as of this encounter: 1.754 m (5' 9.06\").    Weight as of this encounter: 106.6 kg (235 lb 1.6 oz).     Counseling  Appropriate preventive services were addressed with this patient via screening, questionnaire, or discussion as appropriate for fall prevention, nutrition, physical activity, Tobacco-use cessation, social engagement, weight loss and cognition.  Checklist reviewing preventive services available has been given to the patient.  Reviewed patient's diet, " "addressing concerns and/or questions.   He is at risk for lack of exercise and has been provided with information to increase physical activity for the benefit of his well-being.   The patient was instructed to see the dentist every 6 months.   He is at risk for psychosocial distress and has been provided with information to reduce risk.       David Guillaume is a 64 year old, presenting for the following:  Physical, Diabetes, Lipids, Hypertension, and Recheck Medication        12/17/2024     8:07 AM   Additional Questions   Roomed by Kristin CARDENAS LPN   Accompanied by self         12/17/2024     8:07 AM   Patient Reported Additional Medications   Patient reports taking the following new medications no new meds          HPI      Diabetes Follow-up    How often are you checking your blood sugar? Not at all  What concerns do you have today about your diabetes? Other: \"yes, I need to do a better job of managing it\" patient states    Do you have any of these symptoms? (Select all that apply)  No numbness or tingling in feet.  No redness, sores or blisters on feet.  No complaints of excessive thirst.  No reports of blurry vision.  No significant changes to weight.  Have you had a diabetic eye exam in the last 12 months? No    BP Readings from Last 2 Encounters:   12/17/24 126/84   11/01/24 122/80     Hemoglobin A1C (%)   Date Value   10/17/2023 6.5 (H)   04/29/2022 5.9 (H)   11/02/2019 6.0 (H)   02/26/2013 5.4     LDL Cholesterol Calculated (mg/dL)   Date Value   10/17/2023 99   04/18/2022 94   10/31/2019 99   05/02/2018 105 (H)         Medication Followup of Lisinopril-hydrochlorothiazide 10-12.5 mg daily  Taking Medication as prescribed: yes  Side Effects:  None  Medication Helping Symptoms:  yes  Hyperlipidemia Follow-Up    Are you regularly taking any medication or supplement to lower your cholesterol?   Yes- Atorvastatin 10 mg daily  Are you having muscle aches or other side effects that you think could be caused by " your cholesterol lowering medication?  No    Hypertension Follow-up    Do you check your blood pressure regularly outside of the clinic? No   Are you following a low salt diet? No  Are your blood pressures ever more than 140 on the top number (systolic) OR more   than 90 on the bottom number (diastolic), for example 140/90? N/A  Medication Followup of Metformin 500 mg daily  Taking Medication as prescribed: yes  Side Effects:  None  Medication Helping Symptoms:  yes  Health Care Directive  Patient does not have a Health Care Directive: Discussed advance care planning with patient; however, patient declined at this time.      12/17/2024   General Health   How would you rate your overall physical health? Good   Feel stress (tense, anxious, or unable to sleep) To some extent      (!) STRESS CONCERN      12/17/2024   Nutrition   Three or more servings of calcium each day? Yes   Diet: Regular (no restrictions)   How many servings of fruit and vegetables per day? (!) 2-3   How many sweetened beverages each day? 0-1            12/17/2024   Exercise   Days per week of moderate/strenous exercise 1 day      (!) EXERCISE CONCERN      12/17/2024   Social Factors   Frequency of gathering with friends or relatives Twice a week   Worry food won't last until get money to buy more No   Food not last or not have enough money for food? No   Do you have housing? (Housing is defined as stable permanent housing and does not include staying ouside in a car, in a tent, in an abandoned building, in an overnight shelter, or couch-surfing.) Yes   Are you worried about losing your housing? No   Lack of transportation? No   Unable to get utilities (heat,electricity)? No            12/17/2024   Fall Risk   Fallen 2 or more times in the past year? No    Trouble with walking or balance? No        Patient-reported          12/17/2024   Dental   Dentist two times every year? (!) NO            12/17/2024   TB Screening   Were you born outside of the  [place]? No      Today's PHQ-2 Score:       12/17/2024     8:18 AM   PHQ-2 ( 1999 [employer])   Q1: Little interest or pleasure in doing things 0   Q2: Feeling down, depressed or hopeless 0   PHQ-2 Score 0         12/17/2024   Substance Use   Alcohol more than 3/day or more than 7/wk No   Do you use any other substances recreationally? No        Social History     Tobacco Use    Smoking status: Never    Smokeless tobacco: Never   Vaping Use    Vaping status: Never Used   Substance Use Topics    Alcohol use: Yes     Comment: rare    Drug use: No           12/17/2024   STI Screening   New sexual partner(s) since last STI/HIV test? No      Last PSA: No results found for: \"PSA\"  ASCVD Risk   The 10-year ASCVD risk score (Gonzalo CLOÓN, et al., 2019) is: 21.4%    Values used to calculate the score:      Age: 64 years      Sex: Male      Is Non- : No      Diabetic: Yes      Tobacco smoker: No      Systolic Blood Pressure: 126 mmHg      Is BP treated: Yes      HDL Cholesterol: 44 mg/dL      Total Cholesterol: 186 mg/dL           Reviewed and updated as needed this visit by Provider                    Review of Systems   Constitutional:  Negative for chills and fever.   HENT:  Negative for ear pain.    Eyes:  Negative for pain.   Respiratory:  Negative for cough.    Cardiovascular:  Negative for chest pain.   Gastrointestinal:  Negative for abdominal pain.   Genitourinary:  Negative for dysuria.   Musculoskeletal:  Positive for knee pain.   Skin:  Negative for rash.   Neurological:  Negative for headaches.          Objective    Exam  /84 (BP Location: Left arm, Patient Position: Sitting, Cuff Size: Adult Regular)   Pulse 93   Temp 98  F (36.7  C) (Tympanic)   Resp 20   Ht 1.754 m (5' 9.06\")   Wt 106.6 kg (235 lb 1.6 oz)   SpO2 97%   BMI 34.66 kg/m     Estimated body mass index is 34.66 kg/m  as calculated from the following:    Height as of this encounter: 1.754 m (5' 9.06\").    Weight as of " this encounter: 106.6 kg (235 lb 1.6 oz).    Physical Exam  Constitutional:       General: He is not in acute distress.  HENT:      Head: Normocephalic and atraumatic.      Right Ear: External ear normal.      Left Ear: External ear normal.      Mouth/Throat:      Mouth: Mucous membranes are moist.      Pharynx: Oropharynx is clear. No oropharyngeal exudate or posterior oropharyngeal erythema.   Eyes:      Extraocular Movements: Extraocular movements intact.   Cardiovascular:      Rate and Rhythm: Normal rate and regular rhythm.      Heart sounds: Normal heart sounds.   Pulmonary:      Effort: Pulmonary effort is normal. No respiratory distress.      Breath sounds: Normal breath sounds. No wheezing or rhonchi.   Abdominal:      Palpations: Abdomen is soft. There is no mass.      Tenderness: There is no abdominal tenderness.   Musculoskeletal:         General: No deformity. Normal range of motion.      Cervical back: Normal range of motion and neck supple.   Skin:     General: Skin is warm.      Findings: No rash.   Neurological:      General: No focal deficit present.      Mental Status: He is alert and oriented to person, place, and time.   Psychiatric:         Mood and Affect: Mood normal.         Signed Electronically by: LARISSA KOLB MD

## 2024-12-29 PROCEDURE — 82274 ASSAY TEST FOR BLOOD FECAL: CPT | Performed by: STUDENT IN AN ORGANIZED HEALTH CARE EDUCATION/TRAINING PROGRAM

## 2024-12-31 ENCOUNTER — LAB (OUTPATIENT)
Dept: LAB | Facility: CLINIC | Age: 64
End: 2024-12-31
Payer: COMMERCIAL

## 2024-12-31 DIAGNOSIS — Z12.11 SCREEN FOR COLON CANCER: ICD-10-CM

## 2025-01-02 LAB — HEMOCCULT STL QL IA: NEGATIVE

## 2025-01-19 ASSESSMENT — ACTIVITIES OF DAILY LIVING (ADL)
SIT WITH YOUR KNEE BENT: ACTIVITY IS NOT DIFFICULT
WALK: ACTIVITY IS MINIMALLY DIFFICULT
SWELLING: I DO NOT HAVE THE SYMPTOM
KNEEL ON THE FRONT OF YOUR KNEE: ACTIVITY IS MINIMALLY DIFFICULT
WEAKNESS: I HAVE THE SYMPTOM BUT IT DOES NOT AFFECT MY ACTIVITY
KNEEL ON THE FRONT OF YOUR KNEE: ACTIVITY IS MINIMALLY DIFFICULT
HOW_WOULD_YOU_RATE_THE_CURRENT_FUNCTION_OF_YOUR_KNEE_DURING_YOUR_USUAL_DAILY_ACTIVITIES_ON_A_SCALE_FROM_0_TO_100_WITH_100_BEING_YOUR_LEVEL_OF_KNEE_FUNCTION_PRIOR_TO_YOUR_INJURY_AND_0_BEING_THE_INABILITY_TO_PERFORM_ANY_OF_YOUR_USUAL_DAILY_ACTIVITIES?: 80
KNEE_ACTIVITY_OF_DAILY_LIVING_SUM: 53
STIFFNESS: THE SYMPTOM AFFECTS MY ACTIVITY MODERATELY
GO DOWN STAIRS: ACTIVITY IS MINIMALLY DIFFICULT
GIVING WAY, BUCKLING OR SHIFTING OF KNEE: THE SYMPTOM AFFECTS MY ACTIVITY SLIGHTLY
SQUAT: ACTIVITY IS MINIMALLY DIFFICULT
GO UP STAIRS: ACTIVITY IS MINIMALLY DIFFICULT
LIMPING: I DO NOT HAVE THE SYMPTOM
HOW_WOULD_YOU_RATE_THE_OVERALL_FUNCTION_OF_YOUR_KNEE_DURING_YOUR_USUAL_DAILY_ACTIVITIES?: NEARLY NORMAL
HOW_WOULD_YOU_RATE_THE_OVERALL_FUNCTION_OF_YOUR_KNEE_DURING_YOUR_USUAL_DAILY_ACTIVITIES?: NEARLY NORMAL
SIT WITH YOUR KNEE BENT: ACTIVITY IS NOT DIFFICULT
HOW_WOULD_YOU_RATE_THE_CURRENT_FUNCTION_OF_YOUR_KNEE_DURING_YOUR_USUAL_DAILY_ACTIVITIES_ON_A_SCALE_FROM_0_TO_100_WITH_100_BEING_YOUR_LEVEL_OF_KNEE_FUNCTION_PRIOR_TO_YOUR_INJURY_AND_0_BEING_THE_INABILITY_TO_PERFORM_ANY_OF_YOUR_USUAL_DAILY_ACTIVITIES?: 80
RISE FROM A CHAIR: ACTIVITY IS MINIMALLY DIFFICULT
RISE FROM A CHAIR: ACTIVITY IS MINIMALLY DIFFICULT
LIMPING: I DO NOT HAVE THE SYMPTOM
GIVING WAY, BUCKLING OR SHIFTING OF KNEE: THE SYMPTOM AFFECTS MY ACTIVITY SLIGHTLY
STAND: ACTIVITY IS NOT DIFFICULT
STIFFNESS: THE SYMPTOM AFFECTS MY ACTIVITY MODERATELY
PLEASE_INDICATE_YOR_PRIMARY_REASON_FOR_REFERRAL_TO_THERAPY:: KNEE
RAW_SCORE: 57.08
WALK: ACTIVITY IS MINIMALLY DIFFICULT
STAND: ACTIVITY IS NOT DIFFICULT
AS_A_RESULT_OF_YOUR_KNEE_INJURY,_HOW_WOULD_YOU_RATE_YOUR_CURRENT_LEVEL_OF_DAILY_ACTIVITY?: NEARLY NORMAL
SWELLING: I DO NOT HAVE THE SYMPTOM
GO DOWN STAIRS: ACTIVITY IS MINIMALLY DIFFICULT
SQUAT: ACTIVITY IS MINIMALLY DIFFICULT
GO UP STAIRS: ACTIVITY IS MINIMALLY DIFFICULT
WEAKNESS: I HAVE THE SYMPTOM BUT IT DOES NOT AFFECT MY ACTIVITY
AS_A_RESULT_OF_YOUR_KNEE_INJURY,_HOW_WOULD_YOU_RATE_YOUR_CURRENT_LEVEL_OF_DAILY_ACTIVITY?: NEARLY NORMAL
KNEE_ACTIVITY_OF_DAILY_LIVING_SCORE: 81.54

## 2025-01-20 ENCOUNTER — THERAPY VISIT (OUTPATIENT)
Dept: PHYSICAL THERAPY | Facility: CLINIC | Age: 65
End: 2025-01-20
Attending: STUDENT IN AN ORGANIZED HEALTH CARE EDUCATION/TRAINING PROGRAM
Payer: COMMERCIAL

## 2025-01-20 DIAGNOSIS — M17.11 ARTHRITIS OF RIGHT KNEE: ICD-10-CM

## 2025-01-20 PROCEDURE — 97161 PT EVAL LOW COMPLEX 20 MIN: CPT | Mod: GP | Performed by: PHYSICAL THERAPIST

## 2025-01-20 PROCEDURE — 97110 THERAPEUTIC EXERCISES: CPT | Mod: GP | Performed by: PHYSICAL THERAPIST

## 2025-01-20 NOTE — PROGRESS NOTES
PHYSICAL THERAPY EVALUATION  Type of Visit: Evaluation       Fall Risk Screen:  Fall screen completed by: PT  Have you fallen 2 or more times in the past year?: No  Have you fallen and had an injury in the past year?: No  Is patient a fall risk?: No    Subjective   Pt reports B knee pain that comes and goes.  Pt reports having pain after long car rides that improves with getting up and walking.          Presenting condition or subjective complaint: Intermittent knee stiffness and soreness. One day it can be really painful / stiff, the next day no issues at all.  Date of onset: 12/17/24 (Order date)    Relevant medical history:     Dates & types of surgery: L4-5 discectomy in 2006, appendectomy in 2006    Prior diagnostic imaging/testing results:       Prior therapy history for the same diagnosis, illness or injury: No      Living Environment  Social support: With family members   Type of home: House; Multi-level   Stairs to enter the home: No       Ramp: No   Stairs inside the home: Yes 11 Is there a railing: Yes     Help at home: Home management tasks (cooking, cleaning); Medication and/or finances; Home and Yard maintenance tasks  Equipment owned:       Employment: Yes Sales  Hobbies/Interests: Bicycling, Soccer (my kids play)    Patient goals for therapy: Be able to sit for longer periods of time without stiffness/ pain    Pain assessment: Pain present     Objective   KNEE EVALUATION  PAIN: Pain Level at Rest: 0/10  Pain Level with Use: 6/10  Pain Location: knee  Pain Quality: Aching and stiff  Pain Frequency: intermittent  Pain is Worst: daytime  Pain is Exacerbated By: prolonged sitting.   Pain is Relieved By: NSAIDs, otc medications, and use  Pain Progression: Improved  INTEGUMENTARY (edema, incisions): WNL  POSTURE: WNL  GAIT:  Weightbearing Status: WBAT  Assistive Device(s): None  Gait Deviations: WNL  BALANCE/PROPRIOCEPTION: WNL  WEIGHTBEARING ALIGNMENT: WNL  NON-WEIGHTBEARING ALIGNMENT: WNL  ROM:  B knees  0-130 degrees.   STRENGTH: 5/5 globally.  FLEXIBILITY:  Tight HS, piriformis, rectus femoris.   SPECIAL TESTS: Naomi negative, patellar grind negative.   FUNCTIONAL TESTS: Double Leg Squat: Anterior knee translation, Knee valgus, Hip internal rotation, and Improper use of glutes/hips  PALPATION: WNL  JOINT MOBILITY: WNL    Assessment & Plan   CLINICAL IMPRESSIONS  Medical Diagnosis: R knee osteoarthritis    Treatment Diagnosis: R knee osteoarthritis   Impression/Assessment: Patient is a 64 year old male with R knee complaints.  The following significant findings have been identified: Pain, Impaired muscle performance, and Decreased activity tolerance. These impairments interfere with their ability to perform work tasks and recreational activities as compared to previous level of function.     Clinical Decision Making (Complexity):  Clinical Presentation: Stable/Uncomplicated  Clinical Presentation Rationale: based on medical and personal factors listed in PT evaluation  Clinical Decision Making (Complexity): Low complexity    PLAN OF CARE  Treatment Interventions:  Interventions: Gait Training, Manual Therapy, Neuromuscular Re-education, Therapeutic Activity, Therapeutic Exercise    Long Term Goals     PT Goal 1  Goal Identifier: Walking program  Goal Description: Pt will report compliance to a walking program or alternative continuous activity to maintain joint healthy and activity.  Target Date: 03/03/25      Frequency of Treatment: 1x/every other week  Duration of Treatment: 8 weeks    Recommended Referrals to Other Professionals: None.  Education Assessment:   Learner/Method: Patient  Education Comments: HEP    Risks and benefits of evaluation/treatment have been explained.   Patient/Family/caregiver agrees with Plan of Care.     Evaluation Time:     PT Eval, Low Complexity Minutes (73040): 15     Signing Clinician: Eder Dior PT

## 2025-02-04 ENCOUNTER — THERAPY VISIT (OUTPATIENT)
Dept: PHYSICAL THERAPY | Facility: CLINIC | Age: 65
End: 2025-02-04
Attending: STUDENT IN AN ORGANIZED HEALTH CARE EDUCATION/TRAINING PROGRAM
Payer: COMMERCIAL

## 2025-02-04 DIAGNOSIS — M17.11 ARTHRITIS OF RIGHT KNEE: Primary | ICD-10-CM

## 2025-02-04 PROCEDURE — 97110 THERAPEUTIC EXERCISES: CPT | Mod: GP | Performed by: PHYSICAL THERAPIST

## 2025-03-11 ENCOUNTER — OFFICE VISIT (OUTPATIENT)
Dept: FAMILY MEDICINE | Facility: CLINIC | Age: 65
End: 2025-03-11
Payer: COMMERCIAL

## 2025-03-11 VITALS
OXYGEN SATURATION: 95 % | BODY MASS INDEX: 33.22 KG/M2 | RESPIRATION RATE: 20 BRPM | WEIGHT: 224.3 LBS | HEIGHT: 69 IN | DIASTOLIC BLOOD PRESSURE: 82 MMHG | HEART RATE: 91 BPM | SYSTOLIC BLOOD PRESSURE: 128 MMHG | TEMPERATURE: 98.1 F

## 2025-03-11 DIAGNOSIS — Z23 IMMUNIZATION DUE: ICD-10-CM

## 2025-03-11 DIAGNOSIS — Z28.39 INCOMPLETE IMMUNIZATION STATUS: ICD-10-CM

## 2025-03-11 DIAGNOSIS — E11.9 TYPE 2 DIABETES MELLITUS WITHOUT COMPLICATION, WITHOUT LONG-TERM CURRENT USE OF INSULIN (H): Primary | ICD-10-CM

## 2025-03-11 LAB
CHOLEST SERPL-MCNC: 184 MG/DL
EST. AVERAGE GLUCOSE BLD GHB EST-MCNC: 146 MG/DL
FASTING STATUS PATIENT QL REPORTED: YES
HBA1C MFR BLD: 6.7 % (ref 0–5.6)
HDLC SERPL-MCNC: 44 MG/DL
LDLC SERPL CALC-MCNC: 99 MG/DL
MEV IGG SER IA-ACNC: 275 AU/ML
MEV IGG SER IA-ACNC: POSITIVE
MUMPS ANTIBODY IGG INSTRUMENT VALUE: >300 AU/ML
MUV IGG SER QL IA: POSITIVE
NONHDLC SERPL-MCNC: 140 MG/DL
RUBV IGG SERPL QL IA: 5.63 INDEX
RUBV IGG SERPL QL IA: POSITIVE
TRIGL SERPL-MCNC: 205 MG/DL

## 2025-03-11 PROCEDURE — 3074F SYST BP LT 130 MM HG: CPT | Performed by: STUDENT IN AN ORGANIZED HEALTH CARE EDUCATION/TRAINING PROGRAM

## 2025-03-11 PROCEDURE — 90678 RSV VACC PREF BIVALENT IM: CPT | Performed by: STUDENT IN AN ORGANIZED HEALTH CARE EDUCATION/TRAINING PROGRAM

## 2025-03-11 PROCEDURE — 90472 IMMUNIZATION ADMIN EACH ADD: CPT | Performed by: STUDENT IN AN ORGANIZED HEALTH CARE EDUCATION/TRAINING PROGRAM

## 2025-03-11 PROCEDURE — 83036 HEMOGLOBIN GLYCOSYLATED A1C: CPT | Performed by: STUDENT IN AN ORGANIZED HEALTH CARE EDUCATION/TRAINING PROGRAM

## 2025-03-11 PROCEDURE — 86765 RUBEOLA ANTIBODY: CPT | Performed by: STUDENT IN AN ORGANIZED HEALTH CARE EDUCATION/TRAINING PROGRAM

## 2025-03-11 PROCEDURE — 99213 OFFICE O/P EST LOW 20 MIN: CPT | Mod: 25 | Performed by: STUDENT IN AN ORGANIZED HEALTH CARE EDUCATION/TRAINING PROGRAM

## 2025-03-11 PROCEDURE — 3079F DIAST BP 80-89 MM HG: CPT | Performed by: STUDENT IN AN ORGANIZED HEALTH CARE EDUCATION/TRAINING PROGRAM

## 2025-03-11 PROCEDURE — 86735 MUMPS ANTIBODY: CPT | Performed by: STUDENT IN AN ORGANIZED HEALTH CARE EDUCATION/TRAINING PROGRAM

## 2025-03-11 PROCEDURE — 90471 IMMUNIZATION ADMIN: CPT | Performed by: STUDENT IN AN ORGANIZED HEALTH CARE EDUCATION/TRAINING PROGRAM

## 2025-03-11 PROCEDURE — 80061 LIPID PANEL: CPT | Performed by: STUDENT IN AN ORGANIZED HEALTH CARE EDUCATION/TRAINING PROGRAM

## 2025-03-11 PROCEDURE — 90750 HZV VACC RECOMBINANT IM: CPT | Performed by: STUDENT IN AN ORGANIZED HEALTH CARE EDUCATION/TRAINING PROGRAM

## 2025-03-11 PROCEDURE — G2211 COMPLEX E/M VISIT ADD ON: HCPCS | Performed by: STUDENT IN AN ORGANIZED HEALTH CARE EDUCATION/TRAINING PROGRAM

## 2025-03-11 PROCEDURE — 1126F AMNT PAIN NOTED NONE PRSNT: CPT | Performed by: STUDENT IN AN ORGANIZED HEALTH CARE EDUCATION/TRAINING PROGRAM

## 2025-03-11 PROCEDURE — 36415 COLL VENOUS BLD VENIPUNCTURE: CPT | Performed by: STUDENT IN AN ORGANIZED HEALTH CARE EDUCATION/TRAINING PROGRAM

## 2025-03-11 PROCEDURE — 86762 RUBELLA ANTIBODY: CPT | Performed by: STUDENT IN AN ORGANIZED HEALTH CARE EDUCATION/TRAINING PROGRAM

## 2025-03-11 ASSESSMENT — PAIN SCALES - GENERAL: PAINLEVEL_OUTOF10: NO PAIN (0)

## 2025-03-11 NOTE — NURSING NOTE
Prior to immunization administration, verified patients identity using patient s name and date of birth. Please see Immunization Activity for additional information.     Screening Questionnaire for Adult Immunization    Are you sick today?   No   Do you have allergies to medications, food, a vaccine component or latex?   No   Have you ever had a serious reaction after receiving a vaccination?   No   Do you have a long-term health problem with heart, lung, kidney, or metabolic disease (e.g., diabetes), asthma, a blood disorder, no spleen, complement component deficiency, a cochlear implant, or a spinal fluid leak?  Are you on long-term aspirin therapy?   Yes Asthma   Do you have cancer, leukemia, HIV/AIDS, or any other immune system problem?   No   Do you have a parent, brother, or sister with an immune system problem?   No   In the past 3 months, have you taken medications that affect  your immune system, such as prednisone, other steroids, or anticancer drugs; drugs for the treatment of rheumatoid arthritis, Crohn s disease, or psoriasis; or have you had radiation treatments?   No   Have you had a seizure, or a brain or other nervous system problem?   No   During the past year, have you received a transfusion of blood or blood    products, or been given immune (gamma) globulin or antiviral drug?   No   For women: Are you pregnant or is there a chance you could become       pregnant during the next month?   No   Have you received any vaccinations in the past 4 weeks?   No     Immunization questionnaire was positive for at least one answer.  Notified Dr Jin.      Patient instructed to remain in clinic for 15 minutes afterwards, and to report any adverse reactions.     Screening performed by Diana Trimble on 3/11/2025 at 9:11 AM.

## 2025-03-11 NOTE — PROGRESS NOTES
"  Assessment & Plan     Type 2 diabetes mellitus without complication, without long-term current use of insulin (H)  > has lost roughly 11lbs since his last visit   - is able to tolerate metformin well without complication   - Hemoglobin A1c; Future  - Lipid panel reflex to direct LDL Fasting; Future    Immunization due  - ZOSTER RECOMBINANT ADJUVANTED (SHINGRIX)  - RSV VACCINE (ABRYSVO)    Incomplete immunization status  - Rubella Antibody IgG; Future  - Rubeola Antibody IgG; Future  - Mumps Immune Status, IgG    Follow-up plan:   - suspect A1c is improving given that patient has successfully made some lifestyle changes, if that is the case he can follow-up again in 6 months, if A1c is worsening, will have him return to clinic sooner to discuss medication adjustments (adding glipizide)      The longitudinal plan of care for the diagnosis(es)/condition(s) as documented were addressed during this visit. Due to the added complexity in care, I will continue to support Markell in the subsequent management and with ongoing continuity of care.        BMI  Estimated body mass index is 33.22 kg/m  as calculated from the following:    Height as of this encounter: 1.75 m (5' 8.9\").    Weight as of this encounter: 101.7 kg (224 lb 4.8 oz).           Subjective   Markell is a 64 year old, presenting for the following health issues:  Recheck Medication (After the increased dose), Diabetes, and Health Maintenance (Would like both vaccinations today )        3/11/2025     8:12 AM   Additional Questions   Roomed by Kristin CARDENAS LPN   Accompanied by self         3/11/2025     8:12 AM   Patient Reported Additional Medications   Patient reports taking the following new medications no new meds     History of Present Illness       Diabetes:   He presents for follow up of diabetes.    He is not checking blood glucose.        He is concerned about other.    He is not experiencing numbness or burning in feet, excessive thirst, blurry vision, " "weight changes or redness, sores or blisters on feet. The patient has not had a diabetic eye exam in the last 12 months.          He eats 2-3 servings of fruits and vegetables daily.He consumes 1 sweetened beverage(s) daily.He exercises with enough effort to increase his heart rate 10 to 19 minutes per day.  He exercises with enough effort to increase his heart rate 3 or less days per week.   He is taking medications regularly.        Medication Followup of Metformin 1000 mg daily  Taking Medication as prescribed: yes  Side Effects:  upset stomach but only if he doesn't eat  Medication Helping Symptoms:  yes    The last few weeks he was [place] swimming walking more   Reduced cracker and chips intake     Review of Systems   Constitutional:  Negative for chills and fever.   HENT:  Negative for ear pain.    Eyes:  Negative for pain.   Respiratory:  Negative for cough.    Cardiovascular:  Negative for chest pain.   Gastrointestinal:  Negative for abdominal pain.   Genitourinary:  Negative for dysuria.   Musculoskeletal:  Negative for neck pain.   Skin:  Negative for rash.   Neurological:  Negative for headaches.           Objective    /82 (BP Location: Left arm, Patient Position: Sitting, Cuff Size: Adult Regular)   Pulse 91   Temp 98.1  F (36.7  C) (Tympanic)   Resp 20   Ht 1.75 m (5' 8.9\")   Wt 101.7 kg (224 lb 4.8 oz)   SpO2 95%   BMI 33.22 kg/m    Body mass index is 33.22 kg/m .  Physical Exam  Constitutional:       General: He is not in acute distress.  HENT:      Head: Normocephalic and atraumatic.      Right Ear: External ear normal.      Left Ear: External ear normal.      Mouth/Throat:      Mouth: Mucous membranes are moist.      Pharynx: Oropharynx is clear. No oropharyngeal exudate or posterior oropharyngeal erythema.   Eyes:      Extraocular Movements: Extraocular movements intact.   Cardiovascular:      Rate and Rhythm: Normal rate and regular rhythm.      Heart sounds: Normal heart sounds. "   Pulmonary:      Effort: Pulmonary effort is normal. No respiratory distress.      Breath sounds: Normal breath sounds. No wheezing or rhonchi.   Abdominal:      Palpations: Abdomen is soft. There is no mass.      Tenderness: There is no abdominal tenderness.   Musculoskeletal:         General: No deformity. Normal range of motion.      Cervical back: Normal range of motion and neck supple.   Skin:     General: Skin is warm.      Findings: No rash.   Neurological:      General: No focal deficit present.      Mental Status: He is alert and oriented to person, place, and time.   Psychiatric:         Mood and Affect: Mood normal.                Signed Electronically by: LARISSA KOLB MD

## 2025-03-12 NOTE — RESULT ENCOUNTER NOTE
Adele Ramsey,     Incredible work on your A1c! You should be very proud of the adjustments you have made to bring your values down to 6.7% I am impressed with your work and please continue to limit your dietary intake of carbohydrate rich foods like rice, pasta, breads, potatoes, sweets. Additionally, limit your intake of drinks with added sugars, such as juice, regular soda, and regular sports or energy drinks.  It is ok for you to have lean meats, chicken, turkey, fish, eggs, nuts, beans, lentils, and tofu. With regards to exercise, try to get at least 30 minutes of CONTINUOUS aerobic exercise at least 3 times per week.       Sincerely,     Swathi Jin MD

## 2025-03-12 NOTE — RESULT ENCOUNTER NOTE
Adele Ramsey,     It is a pleasure providing you with medical care. I have received and reviewed your results, and have the following recommendations:     It does look like you are immunized against measles mumps and rubella.    Based on the results of your cholesterol panel you were noted to have an elevated triglyceride but these values are improved compared to past results. Please continue taking your Lipitor as prescribed and engage in the following lifestyle changes to the best of your ability: Try to limit your dietary intake of fatty, greasy, oily, fried, take out, and fast food when possible. Also, I would suggest you cut back on red and processed meats, sodium and sugar-sweetened foods and beverages. Regarding exercise, please get at least 30 minutes of CONTINUOUS moderate intensity aerobic exercise at least 3 times per week.        Sincerely,     Swathi Jin MD

## 2025-04-12 DIAGNOSIS — I10 ESSENTIAL HYPERTENSION: ICD-10-CM

## 2025-04-14 RX ORDER — LISINOPRIL AND HYDROCHLOROTHIAZIDE 10; 12.5 MG/1; MG/1
1 TABLET ORAL DAILY
Qty: 90 TABLET | Refills: 1 | Status: SHIPPED | OUTPATIENT
Start: 2025-04-14

## 2025-06-21 ENCOUNTER — HEALTH MAINTENANCE LETTER (OUTPATIENT)
Age: 65
End: 2025-06-21